# Patient Record
Sex: FEMALE | Race: WHITE | NOT HISPANIC OR LATINO | Employment: OTHER | URBAN - METROPOLITAN AREA
[De-identification: names, ages, dates, MRNs, and addresses within clinical notes are randomized per-mention and may not be internally consistent; named-entity substitution may affect disease eponyms.]

---

## 2019-07-26 ENCOUNTER — TELEPHONE (OUTPATIENT)
Dept: NEUROSURGERY | Facility: CLINIC | Age: 64
End: 2019-07-26

## 2019-07-26 NOTE — TELEPHONE ENCOUNTER
----- Message from Jennifer Elizondo sent at 7/26/2019 10:50 AM CDT -----  New patient no. [phone]    Before patient makes an appointment, she would like to know if Dr. Miller can help her.  She has moveable joints in the pelvis and lower back.  She can hear and feel them pop when she moves.   She has had xrays and an MRI.   Please call.

## 2019-08-13 ENCOUNTER — OFFICE VISIT (OUTPATIENT)
Dept: NEUROSURGERY | Facility: CLINIC | Age: 64
End: 2019-08-13
Payer: MEDICARE

## 2019-08-13 VITALS
BODY MASS INDEX: 34.72 KG/M2 | HEIGHT: 66 IN | HEART RATE: 64 BPM | WEIGHT: 216.06 LBS | DIASTOLIC BLOOD PRESSURE: 84 MMHG | SYSTOLIC BLOOD PRESSURE: 152 MMHG

## 2019-08-13 DIAGNOSIS — R20.2 PARESTHESIA OF BOTH FEET: ICD-10-CM

## 2019-08-13 DIAGNOSIS — M46.1 BILATERAL SACROILIITIS: ICD-10-CM

## 2019-08-13 DIAGNOSIS — M51.36 DEGENERATIVE DISC DISEASE, LUMBAR: ICD-10-CM

## 2019-08-13 DIAGNOSIS — M47.26 OTHER SPONDYLOSIS WITH RADICULOPATHY, LUMBAR REGION: Primary | ICD-10-CM

## 2019-08-13 PROCEDURE — 99204 PR OFFICE/OUTPT VISIT, NEW, LEVL IV, 45-59 MIN: ICD-10-PCS | Mod: S$PBB,,, | Performed by: PHYSICIAN ASSISTANT

## 2019-08-13 PROCEDURE — 99999 PR PBB SHADOW E&M-EST. PATIENT-LVL III: ICD-10-PCS | Mod: PBBFAC,,, | Performed by: PHYSICIAN ASSISTANT

## 2019-08-13 PROCEDURE — 99999 PR PBB SHADOW E&M-EST. PATIENT-LVL III: CPT | Mod: PBBFAC,,, | Performed by: PHYSICIAN ASSISTANT

## 2019-08-13 PROCEDURE — 99204 OFFICE O/P NEW MOD 45 MIN: CPT | Mod: S$PBB,,, | Performed by: PHYSICIAN ASSISTANT

## 2019-08-13 PROCEDURE — 99213 OFFICE O/P EST LOW 20 MIN: CPT | Mod: PBBFAC,PN | Performed by: PHYSICIAN ASSISTANT

## 2019-08-13 RX ORDER — LOSARTAN POTASSIUM AND HYDROCHLOROTHIAZIDE 12.5; 1 MG/1; MG/1
1 TABLET ORAL DAILY
Refills: 1 | COMMUNITY
Start: 2019-07-17 | End: 2020-01-02 | Stop reason: SDUPTHER

## 2019-08-13 RX ORDER — ATORVASTATIN CALCIUM 20 MG/1
20 TABLET, FILM COATED ORAL DAILY
Refills: 1 | COMMUNITY
Start: 2019-07-17 | End: 2019-10-29 | Stop reason: SDUPTHER

## 2019-08-13 RX ORDER — GABAPENTIN 300 MG/1
300 CAPSULE ORAL DAILY
Refills: 5 | COMMUNITY
Start: 2019-07-17 | End: 2019-12-02 | Stop reason: SDUPTHER

## 2019-08-13 RX ORDER — IBUPROFEN 600 MG/1
600 TABLET ORAL 2 TIMES DAILY
Refills: 0 | Status: ON HOLD | COMMUNITY
Start: 2019-05-09 | End: 2019-11-21 | Stop reason: HOSPADM

## 2019-08-13 RX ORDER — IBUPROFEN AND FAMOTIDINE 26.6; 8 MG/1; MG/1
TABLET ORAL
COMMUNITY
Start: 2019-05-09 | End: 2019-08-13

## 2019-08-13 RX ORDER — DUREZOL 0.5 MG/ML
EMULSION OPHTHALMIC
Refills: 1 | COMMUNITY
Start: 2019-07-25 | End: 2019-11-13 | Stop reason: CLARIF

## 2019-08-13 RX ORDER — BROMFENAC SODIUM 0.7 MG/ML
SOLUTION/ DROPS OPHTHALMIC
Refills: 1 | COMMUNITY
Start: 2019-05-10 | End: 2019-11-13 | Stop reason: CLARIF

## 2019-08-13 NOTE — PROGRESS NOTES
History & Physical    SUBJECTIVE:     Chief Complaint:  Back pain and ft paresthesia      History of Present Illness:  Sandrita Kaiser is 63 y.o.  female with history of former smoker (quit 03/10/2001), hyperlipidemia and hypertension who presents for neurosurgical evaluation, as a self-referral.  She says she has longstanding history of low back pain that radiated down both legs into her feet with associated bilateral feet numbness and tingling.  She underwent lumbar epidural injection no significant relief and subsequent L4-5 spinous process spacer on February 2019 with Dr. Obi Trejo (interventional pain specialist).  This surgery did improve her symptoms; she no longer has severe back pain, radiating thigh pain, and improved her bilateral feet numbness/tingling.  She was able to stand straighter and no longer leaning forward with walking.  However, since the surgery, she developed a lot of popping in her low back/SI joint area and continued to have bilateral feet numbness tingling (starting at arch) that radiates to her lateral ankle (left greater than right).  Pain worsens with walking stairs, swimming, or increased physical activity.  She underwent physical therapy about 3-4 months ago with no significant relief; certain stretches worsens her feet paresthesias.  Denies any bladder/bowel incontinence, gait instability, or saddle anesthesia.  She is taking gabapentin 300 mg once daily but it causes there is significant drowsiness and she has weaned herself off of this.  She has a back brace that is not helping.      Of note, she has previously been evaluated by ft doctor and had MRI of bilateral feet down with no significant findings.  EMG/nerve an x-ray bilateral lower extremity December 2018 show normal study with no radiculopathy.    Low Back Pain Scale  R Low Back-Pain Score: 3  R Low Back-Pain Intensity: Pain killers give moderate relief from pain  R Low Back-Pain Score: It is painful to look after myself  and I am slow and careful  Low Back-Lifting: Pain prevents me from lifting heavy weights off the floor, but I can manage if they are conveniently positioned for example on a table   Low Back-Walking: Pain prevents me walking more than .25 mile   Low Back-Sitting: Pain prevents me from sitting more than than 10 minutes   Low Back-Standing: I cannot stand for longer than 30 mins without increasing pain   Low Back-Sleeping: I have pain in bed but it does not prevent me from sleeping well   Low Back-Social Life: Pain has no significant effect on my social life apart from limiting my more en   Low Back-Traveling: Pain restricts me to short necessary journeys under 30 minutes   Low Back-Changing Degree of Pain: My pain seems to be getting better but improvement is slow           Review of patient's allergies indicates:  Allergies not on file    No current outpatient medications on file.     No current facility-administered medications for this visit.        No past medical history on file.  No past surgical history on file.  Family History     None        Social History     Socioeconomic History    Marital status:      Spouse name: Not on file    Number of children: Not on file    Years of education: Not on file    Highest education level: Not on file   Occupational History    Not on file   Social Needs    Financial resource strain: Not on file    Food insecurity:     Worry: Not on file     Inability: Not on file    Transportation needs:     Medical: Not on file     Non-medical: Not on file   Tobacco Use    Smoking status: Not on file   Substance and Sexual Activity    Alcohol use: Not on file    Drug use: Not on file    Sexual activity: Not on file   Lifestyle    Physical activity:     Days per week: Not on file     Minutes per session: Not on file    Stress: Not on file   Relationships    Social connections:     Talks on phone: Not on file     Gets together: Not on file     Attends Yarsani  "service: Not on file     Active member of club or organization: Not on file     Attends meetings of clubs or organizations: Not on file     Relationship status: Not on file   Other Topics Concern    Not on file   Social History Narrative    Not on file       Review of Systems:  Review of Systems    Constitutional: no fever, chills or night sweats. No changes in weight   Eyes: no visual changes   ENT: no nasal congestion or sore throat   Respiratory: no cough or shortness of breath   Cardiovascular: no chest pain or palpitations   Gastrointestinal: no nausea or vomiting   Genitourinary: no hematuria or dysuria   Integument/Breast: no rash or pruritis   Hematologic/Lymphatic: no easy bruising or lymphadenopathy   Musculoskeletal: no arthralgias or myalgias.  Positive for low back/SI joint pain  Neurological: no seizures or tremors. No weakness.  Positive for bilateral feet paresthesia.   Behavioral/Psych: no auditory or visual hallucinations   Endocrine: no heat or cold intolerance       OBJECTIVE:     Vital Signs  Pulse: 64  BP: (!) 152/84  Pain Score:   3  Height: 5' 6" (167.6 cm)  Weight: 98 kg (216 lb 0.8 oz)  Body mass index is 34.87 kg/m².      Physical Exam:  Neurosurgery Physical Exam    General: well developed, well nourished, no distress.  Comfortable.  Neurologic: Awake, alert and oriented x3. Thought content appropriate.  GCS: Motor: 6/Verbal: 5/Eyes: 4 GCS Total: 15  Cranial nerves: II-XII grossly intact. PERRLA. EOMI without nystagmus. Face symmetric and sensation intact to light touch, tongue midline, shoulder shrug symmetric bilaterally.  Hearing equal bilaterally to finger rub. Palate and uvula rise and fall normally in midline.    Language: no aphasia  Speech: no dysarthria   Neck: supple, without obvious masses    Sensory: intact to light touch B/L UE and LE  Motor Strength: Moves all extremities spontaneously with good tone. Full strength upper and lower extremities. No abnormal movements seen.  "   Strength  Deltoids Triceps Biceps Wrist Extension Wrist Flexion Hand    Upper: R 5/5 5/5 5/5 5/5 5/5 5/5    L 5/5 5/5 5/5 5/5 5/5 5/5     Iliopsoas Quadriceps Knee  Flexion Tibialis  anterior Gastro- cnemius EHL   Lower: R 5/5 5/5 5/5 5/5 5/5 5/5    L 5/5 5/5 5/5 5/5 5/5 5/5     Interossi muscle strength- intact    DTR's - 2 + and symmetric in UE and LE  Overton's - Negative        Lhermitte's - Negative  Spurlings-   Negative         Ankle Clonus - Negative           Babinski  - Negative     SLR - Negative   Gait - normal      Tandem Gait - No difficulty    Able to walk/stand on heels & toes  Cervical ROM and Lumbar ROM - full; no pain with all ROM  No focal numbness or weakness  No midline or paraspinal tenderness to palpation  No difficulty transitioning from seated to standing position or vice versa.  ENT: normal hearing with finger rub  Heart: RRR, no cyanosis, pallor, or edema.   Lungs- normal respiratory effort  Abdomen-  soft, symmetric and nontender  Skin: grossly intact in all 4 extremities without obvious rashes or lesions  Extremities: warm with no cyanosis or edema, or clubbing  Pulses: palpable distal pulses    SI Joint tenderness - positive (left greater than right)  Distraction test-positive right  Compression test-  positive right     Gaenslen's test- positive on right       Greater Trochanter Joint tenderness - Negative  Meir's Test - reproduces pain to right SI joint   Pain on Hip ROM - Negative  Tinel's - Negative Phalen's - Negative    Posture-  No obvious kyphosis with standing posture.  With bending posture, no obvious scapula wing        Diagnostic Results:  All imaging personally reviewed    Outside MRI L-spine dated January 2018  Lumbar lordosis well maintained.  Degenerative disc disease and facet arthropathy worse in lower lumbar levels.  Degenerative disc disease at L4-5 with small annular tear and facet arthropathy causing mild bilateral foraminal narrowing (R>L).  L5-S1 disc  disease and facet arthropathy causing no stenosis.        Outside lumbar AP lateral x-rays dated 01/2019 and 02/2019  No acute findings.  Interspinous spacer at L4-5 present on subsequent imaging.        2/2019          EMG/nerve an x-ray bilateral lower extremity December 2018 show normal study with no radiculopathy.      ASSESSMENT/PLAN:     63 y.o.  female with history of former smoker (quit 03/10/2001), hyperlipidemia and hypertension who presents for neurosurgical evaluation, as a self-referral.  She says she has longstanding history of low back pain that radiated down both legs into her feet with associated bilateral feet numbness and tingling.  She underwent lumbar epidural injection no significant relief and subsequent L4-5 spinous process spacer on February 2019 with Dr. Obi Trejo (interventional pain specialist).  This surgery did improve her walking, posture, and relieved her radiating thigh pain.  However, she continues to have bilateral feet paresthesia and new onset of SI joint/low back pain.  Exam consistent with right greater than left SI joint pain.  However, this may be referred pain from her lumbar spine and degenerative disc disease. I will upload all patient Records and CDs to [employer] system. Plan to review patient imaging case with Dr. Miller later this week when he is available.  I will update patient accordingly. In the interim, I have recommended her to continue PT home exercises and gabapentin.       All imaging were reviewed with patient. All questions were answered.  Patient verbalized understanding and agreed with the above plan of care.  Patient was encouraged to call clinic with any future concerns prior to follow up appt. If any worsening symptoms, patient should report to ED.     Please call with any questions.      Marshal Meyers PA-C  Neurosurgery      Note dictated with voice recognition software, please excuse any grammatical errors.

## 2019-08-14 ENCOUNTER — TELEPHONE (OUTPATIENT)
Dept: NEUROSURGERY | Facility: CLINIC | Age: 64
End: 2019-08-14

## 2019-08-14 NOTE — TELEPHONE ENCOUNTER
The disc can not be pacs there is not diacom. I will have it in the office on my desk for in the morning if you need it.

## 2019-09-10 ENCOUNTER — HOSPITAL ENCOUNTER (OUTPATIENT)
Dept: RADIOLOGY | Facility: HOSPITAL | Age: 64
Discharge: HOME | End: 2019-09-10
Attending: NEUROLOGICAL SURGERY
Payer: MEDICARE

## 2019-09-10 ENCOUNTER — OFFICE VISIT (OUTPATIENT)
Dept: NEUROSURGERY | Facility: CLINIC | Age: 64
End: 2019-09-10
Payer: MEDICARE

## 2019-09-10 VITALS
BODY MASS INDEX: 34.86 KG/M2 | DIASTOLIC BLOOD PRESSURE: 72 MMHG | HEIGHT: 66 IN | WEIGHT: 216.94 LBS | HEART RATE: 60 BPM | SYSTOLIC BLOOD PRESSURE: 153 MMHG

## 2019-09-10 DIAGNOSIS — M54.42 CHRONIC BILATERAL LOW BACK PAIN WITH BILATERAL SCIATICA: Primary | ICD-10-CM

## 2019-09-10 DIAGNOSIS — M54.41 CHRONIC BILATERAL LOW BACK PAIN WITH BILATERAL SCIATICA: Primary | ICD-10-CM

## 2019-09-10 DIAGNOSIS — G89.29 CHRONIC BILATERAL LOW BACK PAIN WITH BILATERAL SCIATICA: Primary | ICD-10-CM

## 2019-09-10 DIAGNOSIS — M54.42 CHRONIC BILATERAL LOW BACK PAIN WITH BILATERAL SCIATICA: ICD-10-CM

## 2019-09-10 DIAGNOSIS — M48.061 BILATERAL STENOSIS OF LATERAL RECESS OF LUMBAR SPINE: ICD-10-CM

## 2019-09-10 DIAGNOSIS — M54.41 CHRONIC BILATERAL LOW BACK PAIN WITH BILATERAL SCIATICA: ICD-10-CM

## 2019-09-10 DIAGNOSIS — M43.16 SPONDYLOLISTHESIS AT L4-L5 LEVEL: ICD-10-CM

## 2019-09-10 DIAGNOSIS — G89.29 CHRONIC BILATERAL LOW BACK PAIN WITH BILATERAL SCIATICA: ICD-10-CM

## 2019-09-10 PROCEDURE — 72100 XR LUMBAR SPINE AP AND LAT WITH FLEX/EXT: ICD-10-PCS | Mod: 26,,, | Performed by: RADIOLOGY

## 2019-09-10 PROCEDURE — 72120 X-RAY BEND ONLY L-S SPINE: CPT | Mod: 26,,, | Performed by: RADIOLOGY

## 2019-09-10 PROCEDURE — 72120 XR LUMBAR SPINE AP AND LAT WITH FLEX/EXT: ICD-10-PCS | Mod: 26,,, | Performed by: RADIOLOGY

## 2019-09-10 PROCEDURE — 99999 PR PBB SHADOW E&M-EST. PATIENT-LVL III: ICD-10-PCS | Mod: PBBFAC,,, | Performed by: NEUROLOGICAL SURGERY

## 2019-09-10 PROCEDURE — 99213 OFFICE O/P EST LOW 20 MIN: CPT | Mod: S$PBB,,, | Performed by: NEUROLOGICAL SURGERY

## 2019-09-10 PROCEDURE — 72100 X-RAY EXAM L-S SPINE 2/3 VWS: CPT | Mod: TC,PN

## 2019-09-10 PROCEDURE — 99999 PR PBB SHADOW E&M-EST. PATIENT-LVL III: CPT | Mod: PBBFAC,,, | Performed by: NEUROLOGICAL SURGERY

## 2019-09-10 PROCEDURE — 99213 OFFICE O/P EST LOW 20 MIN: CPT | Mod: PBBFAC,25,PN | Performed by: NEUROLOGICAL SURGERY

## 2019-09-10 PROCEDURE — 99213 PR OFFICE/OUTPT VISIT, EST, LEVL III, 20-29 MIN: ICD-10-PCS | Mod: S$PBB,,, | Performed by: NEUROLOGICAL SURGERY

## 2019-09-10 PROCEDURE — 72100 X-RAY EXAM L-S SPINE 2/3 VWS: CPT | Mod: 26,,, | Performed by: RADIOLOGY

## 2019-09-10 NOTE — PROGRESS NOTES
"NEUROSURGICAL PROGRESS NOTE    DATE OF SERVICE:  09/10/2019    ATTENDING PHYSICIAN:  Ramos Miller MD    SUBJECTIVE:    INTERIM HISTORY:    This is a very pleasant 63 y.o. female, who is status post placement of the inter spinous spacer in an outside facility by pain management in February 2019 for lateral recess stenosis with radiculopathy.  After the surgery she was able to stand more straight but 1 month later she started having more back pain.  She feels a sensation of instability in her back.  She feels that her back is "popping" more.  Pain is constant but worse with activity such as walking, biking, twisting. Back pain is moderate at this time but interfering with her QOL. She did 3-4 months of PT in the past.       Low Back Pain Scale  R Low Back-Pain Score: 3  R Low Back-Pain Intensity: Pain killers give moderate relief from pain  R Low Back-Pain Score: It is painful to look after myself and I am slow and careful  Low Back-Lifting: Pain prevents me from lifting heavy weights off the floor, but I can manage if they are conveniently positioned for example on a table   Low Back-Walking: Pain prevents me walking more than .25 mile   Low Back-Sitting: Pain prevents me from sitting more than 1 hour   Low Back-Standing: I cannot stand for longer than 30 mins without increasing pain   Low Back-Sleeping: I have pain in bed but it does not prevent me from sleeping well   Low Back-Social Life: Pain has no significant effect on my social life apart from limiting my more en   Low Back-Traveling: Pain restricts me to short necessary journeys under 30 minutes   Low Back-Changing Degree of Pain: My pain is gradually worsening         PAST MEDICAL HISTORY:  Active Ambulatory Problems     Diagnosis Date Noted    No Active Ambulatory Problems     Resolved Ambulatory Problems     Diagnosis Date Noted    No Resolved Ambulatory Problems     No Additional Past Medical History       PAST SURGICAL HISTORY:  No past surgical " history on file.    SOCIAL HISTORY:   Social History     Socioeconomic History    Marital status:      Spouse name: Not on file    Number of children: Not on file    Years of education: Not on file    Highest education level: Not on file   Occupational History    Not on file   Social Needs    Financial resource strain: Not on file    Food insecurity:     Worry: Not on file     Inability: Not on file    Transportation needs:     Medical: Not on file     Non-medical: Not on file   Tobacco Use    Smoking status: Former Smoker    Smokeless tobacco: Former User     Quit date: 3/10/2001    Tobacco comment: 03/10/2001-   Substance and Sexual Activity    Alcohol use: Never     Frequency: Never    Drug use: Never    Sexual activity: Not on file   Lifestyle    Physical activity:     Days per week: Not on file     Minutes per session: Not on file    Stress: Not on file   Relationships    Social connections:     Talks on phone: Not on file     Gets together: Not on file     Attends Protestant service: Not on file     Active member of club or organization: Not on file     Attends meetings of clubs or organizations: Not on file     Relationship status: Not on file   Other Topics Concern    Not on file   Social History Narrative    Not on file       FAMILY HISTORY:  No family history on file.    CURRENTS MEDICATIONS:  Current Outpatient Medications on File Prior to Visit   Medication Sig Dispense Refill    atorvastatin (LIPITOR) 20 MG tablet TK 1 T PO ONCE D  1    DUREZOL 0.05 % Drop ophthalmic solution INT 1 GTT IN OS QID  1    gabapentin (NEURONTIN) 300 MG capsule TK ONE C PO ONCE A DAY  5    ibuprofen (ADVIL,MOTRIN) 600 MG tablet TK 1 T PO TID WF OR MILK PRN  0    losartan-hydrochlorothiazide 100-12.5 mg (HYZAAR) 100-12.5 mg Tab TK 1 T PO ONCE A DAY  1    PROLENSA 0.07 % Drop INSTILL 1 DROP INTO THE RIGHT EYE TID  1     No current facility-administered medications on file prior to visit.         ALLERGIES:  Review of patient's allergies indicates:  No Known Allergies    REVIEW OF SYSTEMS:  Review of Systems   Constitutional: Negative for diaphoresis, fever and weight loss.   Respiratory: Negative for shortness of breath.    Cardiovascular: Negative for chest pain.   Gastrointestinal: Negative for blood in stool.   Genitourinary: Negative for hematuria.   Endo/Heme/Allergies: Does not bruise/bleed easily.   All other systems reviewed and are negative.        OBJECTIVE:    PHYSICAL EXAMINATION:   Vitals:    09/10/19 0810   BP: (!) 153/72   Pulse: 60       Physical Exam:  Vitals reviewed.    Constitutional: She appears well-developed and well-nourished.     Eyes: Pupils are equal, round, and reactive to light. Conjunctivae and EOM are normal.     Cardiovascular: Normal distal pulses and no edema.     Abdominal: Soft.     Skin: Skin displays no rash on trunk and no rash on extremities. Skin displays no lesions on trunk and no lesions on extremities.     Psych/Behavior: She is alert. She is oriented to person, place, and time. She has a normal mood and affect.     Musculoskeletal:        Neck: Range of motion is full.     Neurological:        DTRs: Tricep reflexes are 2+ on the right side and 2+ on the left side. Bicep reflexes are 2+ on the right side and 2+ on the left side. Brachioradialis reflexes are 2+ on the right side and 2+ on the left side. Patellar reflexes are 2+ on the right side and 2+ on the left side. Achilles reflexes are 2+ on the right side and 2+ on the left side.       Back Exam     Tenderness   The patient is experiencing no tenderness.     Range of Motion   Extension: abnormal   Flexion: abnormal   Lateral bend right: abnormal   Lateral bend left: abnormal   Rotation right: abnormal   Rotation left: abnormal     Muscle Strength   Right Quadriceps:  5/5   Left Quadriceps:  5/5   Right Hamstrings:  5/5   Left Hamstrings:  5/5     Tests   Straight leg raise right: negative  Straight leg  raise left: negative    Other   Toe walk: normal  Heel walk: normal                Neurologic Exam     Mental Status   Oriented to person, place, and time.   Speech: speech is normal   Level of consciousness: alert    Cranial Nerves   Cranial nerves II through XII intact.     CN III, IV, VI   Pupils are equal, round, and reactive to light.  Extraocular motions are normal.     Motor Exam   Muscle bulk: normal  Overall muscle tone: normal    Strength   Right deltoid: 5/5  Left deltoid: 5/5  Right biceps: 5/5  Left biceps: 5/5  Right triceps: 5/5  Left triceps: 5/5  Right wrist flexion: 5/5  Left wrist flexion: 5/5  Right wrist extension: 5/5  Left wrist extension: 5/5  Right interossei: 5/5  Left interossei: 5/5  Right iliopsoas: 5/5  Left iliopsoas: 5/5  Right quadriceps: 5/5  Left quadriceps: 5/5  Right hamstrin/5  Left hamstrin/5  Right anterior tibial: 5/5  Left anterior tibial: 5/5  Right posterior tibial: 5/5  Left posterior tibial: 5/5  Right peroneal: 5/5  Left peroneal: 5/5  Right gastroc: 5/5  Left gastroc: 5/5    Sensory Exam   Light touch normal.   Pinprick normal.     Gait, Coordination, and Reflexes     Gait  Gait: normal    Coordination   Finger to nose coordination: normal  Tandem walking coordination: normal    Reflexes   Right brachioradialis: 2+  Left brachioradialis: 2+  Right biceps: 2+  Left biceps: 2+  Right triceps: 2+  Left triceps: 2+  Right patellar: 2+  Left patellar: 2+  Right achilles: 2+  Left achilles: 2+  Right plantar: normal  Left plantar: normal  Right Overton: absent  Left Overton: absent  Right ankle clonus: absent  Left ankle clonus: absent        DIAGNOSTIC DATA:  I personally interpreted the following imaging:   Lumbar spine MRI 2019: L4-5 grade one spondylolisthesis with bilateral lateral recess stenosis  Lumbar XR today: worsening L4-5 spondylolisthesis    ASSESMENT:  This is a 63 y.o. 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 I recommended a left L4-5 oblique interbody fusion placement interbody spacer using allograft, L4-5 posterior instrumentation, removal of interspinous process.     We have discussed the risks of surgery including bleeding, infection, failure of surgery, CSF leak, nerve root injury, spinal cord injury, ureter injury, weakness, paralysis, peripheral neuropathy, malplaced hardware, migration of hardware, non-union, need for reoperation. Patient understands the risks and would like to proceed with surgery.              Ramos Miller MD  Cell:[phone]

## 2019-09-11 ENCOUNTER — TELEPHONE (OUTPATIENT)
Dept: NEUROSURGERY | Facility: CLINIC | Age: 64
End: 2019-09-11

## 2019-09-11 NOTE — TELEPHONE ENCOUNTER
----- Message from Isa Noyola sent at 9/11/2019 12:21 PM CDT -----  Contact: Self [phone]  Patient would like to speak with you about what can she do after her fusion procedure. Please advise

## 2019-09-12 ENCOUNTER — TELEPHONE (OUTPATIENT)
Dept: NEUROSURGERY | Facility: CLINIC | Age: 64
End: 2019-09-12

## 2019-09-12 DIAGNOSIS — M43.10 SPONDYLOLISTHESIS, UNSPECIFIED SPINAL REGION: ICD-10-CM

## 2019-09-12 DIAGNOSIS — Z98.1 S/P LUMBAR FUSION: Primary | ICD-10-CM

## 2019-09-12 DIAGNOSIS — M51.36 DDD (DEGENERATIVE DISC DISEASE), LUMBAR: ICD-10-CM

## 2019-09-12 DIAGNOSIS — M48.061 STENOSIS OF LATERAL RECESS OF LUMBAR SPINE: ICD-10-CM

## 2019-10-10 ENCOUNTER — TELEPHONE (OUTPATIENT)
Dept: NEUROSURGERY | Facility: CLINIC | Age: 64
End: 2019-10-10

## 2019-10-10 NOTE — TELEPHONE ENCOUNTER
----- Message from Luly Smith sent at 10/10/2019  3:15 PM CDT -----  Contact: DELORES KIRAN [[id number]]  [phone]    Patient needs to have her testing scheduled in [place]. She stated she is having issues with her insurance paying for the tests at the [facility].

## 2019-10-29 ENCOUNTER — OFFICE VISIT (OUTPATIENT)
Dept: FAMILY MEDICINE | Facility: CLINIC | Age: 64
End: 2019-10-29
Payer: MEDICARE

## 2019-10-29 ENCOUNTER — HOSPITAL ENCOUNTER (OUTPATIENT)
Dept: RADIOLOGY | Facility: HOSPITAL | Age: 64
Discharge: HOME | End: 2019-10-29
Attending: NURSE PRACTITIONER
Payer: MEDICARE

## 2019-10-29 ENCOUNTER — TELEPHONE (OUTPATIENT)
Dept: FAMILY MEDICINE | Facility: CLINIC | Age: 64
End: 2019-10-29

## 2019-10-29 VITALS
HEART RATE: 64 BPM | BODY MASS INDEX: 35.99 KG/M2 | SYSTOLIC BLOOD PRESSURE: 136 MMHG | WEIGHT: 216 LBS | HEIGHT: 65 IN | DIASTOLIC BLOOD PRESSURE: 72 MMHG

## 2019-10-29 DIAGNOSIS — I10 HYPERTENSION, UNSPECIFIED TYPE: ICD-10-CM

## 2019-10-29 DIAGNOSIS — Z01.818 PRE-OPERATIVE CLEARANCE: ICD-10-CM

## 2019-10-29 DIAGNOSIS — E78.5 HYPERLIPIDEMIA, UNSPECIFIED HYPERLIPIDEMIA TYPE: Primary | ICD-10-CM

## 2019-10-29 DIAGNOSIS — M54.16 LUMBAR RADICULOPATHY: ICD-10-CM

## 2019-10-29 PROCEDURE — 99214 PR OFFICE/OUTPT VISIT, EST, LEVL IV, 30-39 MIN: ICD-10-PCS | Mod: S$GLB,,, | Performed by: NURSE PRACTITIONER

## 2019-10-29 PROCEDURE — 99214 OFFICE O/P EST MOD 30 MIN: CPT | Mod: S$GLB,,, | Performed by: NURSE PRACTITIONER

## 2019-10-29 RX ORDER — ATORVASTATIN CALCIUM 20 MG/1
20 TABLET, FILM COATED ORAL DAILY
Qty: 90 TABLET | Refills: 1 | Status: SHIPPED | OUTPATIENT
Start: 2019-10-29 | End: 2019-12-02 | Stop reason: SDUPTHER

## 2019-10-29 NOTE — PROGRESS NOTES
SUBJECTIVE:    Patient ID: Sandrita Kaiser is a 64 y.o. female.    Chief Complaint: Pre-op Exam (infuse L4 & L5// SW)    Pt here for surgery clearance.  Back pain is getting worse. Plans to have fusion performed with Dr. Miller in [place]. Surgery is scheduled for mid November.  Compliant with meds. Hx of white coat syndrome. Home readings look great around 110-130's/70's consistently.. Still under a good bit of stress with her  who has dementia. Still dealing with distal neuropathy but all better overall after lumbar spacer. No longer able to exercise. Scheduled for surgery on November 19th with Dr. Miller. Plans to have lumbar fusion. Needs preop labs ekg and chest xray.       Office Visit on 10/29/2019   Component Date Value Ref Range Status    WBC 10/29/2019 6.8  3.8 - 10.8 Thousand/uL Final    RBC 10/29/2019 4.70  3.80 - 5.10 Million/uL Final    Hemoglobin 10/29/2019 13.3  11.7 - 15.5 g/dL Final    Hematocrit 10/29/2019 39.3  35.0 - 45.0 % Final    Mean Corpuscular Volume 10/29/2019 83.6  80.0 - 100.0 fL Final    Mean Corpuscular Hemoglobin 10/29/2019 28.3  27.0 - 33.0 pg Final    Mean Corpuscular Hemoglobin Conc 10/29/2019 33.8  32.0 - 36.0 g/dL Final    RDW 10/29/2019 14.2  11.0 - 15.0 % Final    Platelets 10/29/2019 324  140 - 400 Thousand/uL Final    MPV 10/29/2019 9.6  7.5 - 12.5 fL Final    Neutrophils Absolute 10/29/2019 3,305  1,500 - 7,800 cells/uL Final    Lymph # 10/29/2019 2,734  850 - 3,900 cells/uL Final    Mono # 10/29/2019 530  200 - 950 cells/uL Final    Eos # 10/29/2019 190  15 - 500 cells/uL Final    Baso # 10/29/2019 41  0 - 200 cells/uL Final    Neutrophils Relative 10/29/2019 48.6  % Final    Lymph% 10/29/2019 40.2  % Final    Mono% 10/29/2019 7.8  % Final    Eosinophil% 10/29/2019 2.8  % Final    Basophil% 10/29/2019 0.6  % Final    Glucose 10/29/2019 95  65 - 99 mg/dL Final    BUN, Bld 10/29/2019 13  7 - 25 mg/dL Final    Creatinine 10/29/2019 0.96  0.50 -  0.99 mg/dL Final    eGFR if non African American 10/29/2019 62  > OR = 60 mL/min/1.73m2 Final    eGFR if  10/29/2019 71  > OR = 60 mL/min/1.73m2 Final    BUN/Creatinine Ratio 10/29/2019 NOT APPLICABLE  6 - 22 (calc) Final    Sodium 10/29/2019 135  135 - 146 mmol/L Final    Potassium 10/29/2019 4.4  3.5 - 5.3 mmol/L Final    Chloride 10/29/2019 97* 98 - 110 mmol/L Final    CO2 10/29/2019 30  20 - 32 mmol/L Final    Calcium 10/29/2019 10.0  8.6 - 10.4 mg/dL Final    Total Protein 10/29/2019 7.3  6.1 - 8.1 g/dL Final    Albumin 10/29/2019 4.3  3.6 - 5.1 g/dL Final    Globulin, Total 10/29/2019 3.0  1.9 - 3.7 g/dL (calc) Final    Albumin/Globulin Ratio 10/29/2019 1.4  1.0 - 2.5 (calc) Final    Total Bilirubin 10/29/2019 0.5  0.2 - 1.2 mg/dL Final    Alkaline Phosphatase 10/29/2019 91  33 - 130 U/L Final    AST 10/29/2019 17  10 - 35 U/L Final    ALT 10/29/2019 24  6 - 29 U/L Final    INR 10/29/2019 0.9   Final    Prothrombin Time 10/29/2019 9.8  9.0 - 11.5 sec Final    aPTT 10/29/2019 30  22 - 34 sec Final    Color, UA 10/29/2019 YELLOW  YELLOW Final    Appearance, UA 10/29/2019 CLEAR  CLEAR Final    Specific [place], UA 10/29/2019 1.015  1.001 - 1.035 Final    pH, UA 10/29/2019 8.0  5.0 - 8.0 Final    Glucose, UA 10/29/2019 NEGATIVE  NEGATIVE Final    Bilirubin, UA 10/29/2019 NEGATIVE  NEGATIVE Final    Ketones, UA 10/29/2019 NEGATIVE  NEGATIVE Final    Occult Blood UA 10/29/2019 NEGATIVE  NEGATIVE Final    Protein, UA 10/29/2019 NEGATIVE  NEGATIVE Final    Nitrite, UA 10/29/2019 NEGATIVE  NEGATIVE Final    Leukocytes, UA 10/29/2019 NEGATIVE  NEGATIVE Final    WBC Casts, UA 10/29/2019 NONE SEEN  < OR = 5 /HPF Final    RBC Casts, UA 10/29/2019 NONE SEEN  < OR = 2 /HPF Final    Squam Epithel, UA 10/29/2019 0-5  < OR = 5 /HPF Final    Bacteria, UA 10/29/2019 NONE SEEN  NONE SEEN /HPF Final    Hyaline Casts, UA 10/29/2019 NONE SEEN  NONE SEEN /LPF Final       Past Medical  History:   Diagnosis Date    Hypertension      Past Surgical History:   Procedure Laterality Date    BREAST BIOPSY      TONSILLECTOMY      TUBAL LIGATION       History reviewed. No pertinent family history.    Marital Status:   Alcohol History:  reports that she does not drink alcohol.  Tobacco History:  reports that she has quit smoking. She quit smokeless tobacco use about 18 years ago.  Drug History:  reports that she does not use drugs.    Review of patient's allergies indicates:   Allergen Reactions    Lisinopril      Other reaction(s): BETTE       Current Outpatient Medications:     atorvastatin (LIPITOR) 20 MG tablet, Take 1 tablet (20 mg total) by mouth once daily., Disp: 90 tablet, Rfl: 1    FLUZONE HIGH-DOSE 2019-20, PF, 180 mcg/0.5 mL Syrg, ADM 0.5ML IM UTD, Disp: , Rfl: 0    gabapentin (NEURONTIN) 300 MG capsule, Take 300 mg by mouth once daily. , Disp: , Rfl: 5    ibuprofen (ADVIL,MOTRIN) 600 MG tablet, Take 600 mg by mouth 2 (two) times daily. , Disp: , Rfl: 0    losartan-hydrochlorothiazide 100-12.5 mg (HYZAAR) 100-12.5 mg Tab, Take 1 tablet by mouth once daily. , Disp: , Rfl: 1    DUREZOL 0.05 % Drop ophthalmic solution, INT 1 GTT IN OS QID, Disp: , Rfl: 1    PROLENSA 0.07 % Drop, INSTILL 1 DROP INTO THE RIGHT EYE TID, Disp: , Rfl: 1    Review of Systems   Constitutional: Negative for chills, fever and unexpected weight change.   HENT: Negative for ear pain, rhinorrhea and sore throat.    Eyes: Negative for pain and visual disturbance.   Respiratory: Negative for cough and shortness of breath.    Cardiovascular: Negative for chest pain, palpitations and leg swelling.   Gastrointestinal: Negative for abdominal pain, diarrhea, nausea and vomiting.   Genitourinary: Negative for difficulty urinating, hematuria and vaginal bleeding.   Musculoskeletal: Positive for back pain. Negative for arthralgias.   Skin: Negative for rash.   Neurological: Negative for dizziness, weakness and  "headaches.   Psychiatric/Behavioral: Negative for agitation and sleep disturbance. The patient is not nervous/anxious.           Objective:      Vitals:    10/29/19 1012   BP: 136/72   Pulse: 64   Weight: 98 kg (216 lb)   Height: 5' 5.25" (1.657 m)     Body mass index is 35.67 kg/m².  Physical Exam   Constitutional: She is oriented to person, place, and time. She appears well-developed and well-nourished.   HENT:   Right Ear: External ear normal.   Left Ear: External ear normal.   Mouth/Throat: Oropharynx is clear and moist.   Neck: Normal range of motion. Neck supple. No JVD present.   Cardiovascular: Normal rate and regular rhythm.   No murmur heard.  Pulmonary/Chest: Effort normal and breath sounds normal.   Abdominal: Soft. Bowel sounds are normal.   Musculoskeletal: She exhibits no edema or deformity.        Lumbar back: She exhibits decreased range of motion.   Lymphadenopathy:     She has no cervical adenopathy.   Neurological: She is alert and oriented to person, place, and time. Gait normal.   Skin: Skin is warm, dry and intact. No rash noted.   Psychiatric: She has a normal mood and affect. Her speech is normal and behavior is normal.         Assessment:       1. Hyperlipidemia, unspecified hyperlipidemia type    2. Hypertension, unspecified type    3. Lumbar radiculopathy    4. Pre-operative clearance         Plan:       Hyperlipidemia, unspecified hyperlipidemia type  -     atorvastatin (LIPITOR) 20 MG tablet; Take 1 tablet (20 mg total) by mouth once daily.  Dispense: 90 tablet; Refill: 1    Hypertension, unspecified type    Lumbar radiculopathy    Pre-operative clearance  -     POCT EKG 12-LEAD (NOT FOR [facility] USE)  -     X-Ray Chest PA And Lateral; Future; Expected date: 10/29/2019  -     CBC auto differential; Future; Expected date: 10/29/2019  -     Comprehensive metabolic panel; Future; Expected date: 10/29/2019  -     Protime-INR; Future; Expected date: 10/29/2019  -     APTT; Future; Expected " date: 10/29/2019  -     Urinalysis, Reflex to Urine Culture Urine, Clean Catch; Future; Expected date: 10/29/2019      Follow up in about 3 months (around 1/29/2020).

## 2019-10-29 NOTE — TELEPHONE ENCOUNTER
----- Message from Roz Mccann sent at 10/29/2019  2:21 PM CDT -----  Pt is suppose to have surgery nov 19 and her insurance wont pay for the xray in [place] because the surgery is in [facility] . Pt called her insurance and they are saying she is needing the nurse to call medicare to get the code for her insurance to pay for the xray in [place]. Medicare [phone]. They did to know that [facility] is with [facility]   Pt [phone]

## 2019-10-30 ENCOUNTER — TELEPHONE (OUTPATIENT)
Dept: NEUROSURGERY | Facility: CLINIC | Age: 64
End: 2019-10-30

## 2019-10-30 LAB
ALBUMIN SERPL-MCNC: 4.3 G/DL (ref 3.6–5.1)
ALBUMIN/GLOB SERPL: 1.4 (CALC) (ref 1–2.5)
ALP SERPL-CCNC: 91 U/L (ref 33–130)
ALT SERPL-CCNC: 24 U/L (ref 6–29)
APPEARANCE UR: CLEAR
APTT PPP: 30 SEC (ref 22–34)
AST SERPL-CCNC: 17 U/L (ref 10–35)
BACTERIA #/AREA URNS HPF: NORMAL /HPF
BACTERIA UR CULT: NORMAL
BASOPHILS # BLD AUTO: 41 CELLS/UL (ref 0–200)
BASOPHILS NFR BLD AUTO: 0.6 %
BILIRUB SERPL-MCNC: 0.5 MG/DL (ref 0.2–1.2)
BILIRUB UR QL STRIP: NEGATIVE
BUN SERPL-MCNC: 13 MG/DL (ref 7–25)
BUN/CREAT SERPL: ABNORMAL (CALC) (ref 6–22)
CALCIUM SERPL-MCNC: 10 MG/DL (ref 8.6–10.4)
CHLORIDE SERPL-SCNC: 97 MMOL/L (ref 98–110)
CO2 SERPL-SCNC: 30 MMOL/L (ref 20–32)
COLOR UR: YELLOW
CREAT SERPL-MCNC: 0.96 MG/DL (ref 0.5–0.99)
EOSINOPHIL # BLD AUTO: 190 CELLS/UL (ref 15–500)
EOSINOPHIL NFR BLD AUTO: 2.8 %
ERYTHROCYTE [DISTWIDTH] IN BLOOD BY AUTOMATED COUNT: 14.2 % (ref 11–15)
GFRSERPLBLD MDRD-ARVRAT: 62 ML/MIN/1.73M2
GLOBULIN SER CALC-MCNC: 3 G/DL (CALC) (ref 1.9–3.7)
GLUCOSE SERPL-MCNC: 95 MG/DL (ref 65–99)
GLUCOSE UR QL STRIP: NEGATIVE
HCT VFR BLD AUTO: 39.3 % (ref 35–45)
HGB BLD-MCNC: 13.3 G/DL (ref 11.7–15.5)
HGB UR QL STRIP: NEGATIVE
HYALINE CASTS #/AREA URNS LPF: NORMAL /LPF
INR PPP: 0.9
KETONES UR QL STRIP: NEGATIVE
LEUKOCYTE ESTERASE UR QL STRIP: NEGATIVE
LYMPHOCYTES # BLD AUTO: 2734 CELLS/UL (ref 850–3900)
LYMPHOCYTES NFR BLD AUTO: 40.2 %
MCH RBC QN AUTO: 28.3 PG (ref 27–33)
MCHC RBC AUTO-ENTMCNC: 33.8 G/DL (ref 32–36)
MCV RBC AUTO: 83.6 FL (ref 80–100)
MONOCYTES # BLD AUTO: 530 CELLS/UL (ref 200–950)
MONOCYTES NFR BLD AUTO: 7.8 %
NEUTROPHILS # BLD AUTO: 3305 CELLS/UL (ref 1500–7800)
NEUTROPHILS NFR BLD AUTO: 48.6 %
NITRITE UR QL STRIP: NEGATIVE
PH UR STRIP: 8 [PH] (ref 5–8)
PLATELET # BLD AUTO: 324 THOUSAND/UL (ref 140–400)
PMV BLD REES-ECKER: 9.6 FL (ref 7.5–12.5)
POTASSIUM SERPL-SCNC: 4.4 MMOL/L (ref 3.5–5.3)
PROT SERPL-MCNC: 7.3 G/DL (ref 6.1–8.1)
PROT UR QL STRIP: NEGATIVE
PROTHROMBIN TIME: 9.8 SEC (ref 9–11.5)
RBC # BLD AUTO: 4.7 MILLION/UL (ref 3.8–5.1)
RBC #/AREA URNS HPF: NORMAL /HPF
SODIUM SERPL-SCNC: 135 MMOL/L (ref 135–146)
SP GR UR STRIP: 1.01 (ref 1–1.03)
SQUAMOUS #/AREA URNS HPF: NORMAL /HPF
WBC # BLD AUTO: 6.8 THOUSAND/UL (ref 3.8–10.8)
WBC #/AREA URNS HPF: NORMAL /HPF

## 2019-10-30 NOTE — TELEPHONE ENCOUNTER
----- Message from Ida Zaragoza sent at 10/30/2019  2:05 PM CDT -----  Contact: self, [phone]  Patient states she is scheduled for procedure on 11/19 and Medicare will not cover X-ray ordered. Please advise.

## 2019-10-30 NOTE — TELEPHONE ENCOUNTER
"Tried calling medicare - sat on hold and got put through to someone that stated she can only speak with providers and would not go further unless I had the providers PTAN for medicare. I spoke with the pt and let her know we are unable to get this "code" she says they need. She is going to call the surgeon and see what can be done.  "

## 2019-10-30 NOTE — TELEPHONE ENCOUNTER
Spoke with pt, states the insurance is not going to pay for her to have this xray done in [place] unless a provider calls Medicare and gets a code.

## 2019-10-31 ENCOUNTER — HOSPITAL ENCOUNTER (OUTPATIENT)
Dept: RADIOLOGY | Facility: HOSPITAL | Age: 64
Discharge: HOME | End: 2019-10-31
Attending: NURSE PRACTITIONER
Payer: MEDICARE

## 2019-10-31 PROCEDURE — 71046 X-RAY EXAM CHEST 2 VIEWS: CPT | Mod: 26,,, | Performed by: RADIOLOGY

## 2019-10-31 PROCEDURE — 71046 XR CHEST PA AND LATERAL: ICD-10-PCS | Mod: 26,,, | Performed by: RADIOLOGY

## 2019-10-31 PROCEDURE — 71046 X-RAY EXAM CHEST 2 VIEWS: CPT | Mod: TC,FY

## 2019-11-05 ENCOUNTER — TELEPHONE (OUTPATIENT)
Dept: SURGERY | Facility: HOSPITAL | Age: 64
End: 2019-11-05

## 2019-11-13 ENCOUNTER — TELEPHONE (OUTPATIENT)
Dept: FAMILY MEDICINE | Facility: CLINIC | Age: 64
End: 2019-11-13

## 2019-11-13 ENCOUNTER — ANESTHESIA EVENT (OUTPATIENT)
Dept: SURGERY | Facility: HOSPITAL | Age: 64
DRG: 460 | End: 2019-11-13
Payer: MEDICARE

## 2019-11-13 ENCOUNTER — HOSPITAL ENCOUNTER (OUTPATIENT)
Dept: PREADMISSION TESTING | Facility: HOSPITAL | Age: 64
Discharge: HOME | End: 2019-11-13
Attending: NEUROLOGICAL SURGERY
Payer: MEDICARE

## 2019-11-13 DIAGNOSIS — Z01.818 PREOP TESTING: Primary | ICD-10-CM

## 2019-11-13 RX ORDER — LIDOCAINE HYDROCHLORIDE 10 MG/ML
1 INJECTION, SOLUTION EPIDURAL; INFILTRATION; INTRACAUDAL; PERINEURAL ONCE
Status: CANCELLED | OUTPATIENT
Start: 2019-11-13 | End: 2019-11-13

## 2019-11-13 RX ORDER — OMEPRAZOLE 20 MG/1
20 CAPSULE, DELAYED RELEASE ORAL DAILY
COMMUNITY
End: 2021-02-05 | Stop reason: SDUPTHER

## 2019-11-13 RX ORDER — MULTIVITAMIN
1 TABLET ORAL DAILY
COMMUNITY

## 2019-11-13 RX ORDER — SODIUM CHLORIDE, SODIUM LACTATE, POTASSIUM CHLORIDE, CALCIUM CHLORIDE 600; 310; 30; 20 MG/100ML; MG/100ML; MG/100ML; MG/100ML
INJECTION, SOLUTION INTRAVENOUS CONTINUOUS
Status: CANCELLED | OUTPATIENT
Start: 2019-11-13

## 2019-11-13 NOTE — ANESTHESIA PREPROCEDURE EVALUATION
11/13/2019  Sandrita Kaiser is a 64 y.o., female scheduled for L4-5 lumbar fusion on 11/19/19.    Past Medical History:   Diagnosis Date    High cholesterol     Hypertension      Past Surgical History:   Procedure Laterality Date    BREAST BIOPSY      TONSILLECTOMY      TUBAL LIGATION       Anesthesia Evaluation    I have reviewed the Patient Summary Reports.    I have reviewed the Nursing Notes.   I have reviewed the Medications.     Review of Systems  Anesthesia Hx:  Hx of Anesthetic complications PONV Denies Family Hx of Anesthesia complications.    Social:  Former Smoker, No Alcohol Use    Hematology/Oncology:  Hematology Normal        Cardiovascular:   Hypertension  Denies Angina. hyperlipidemia    Renal/:  Renal/ Normal     Hepatic/GI:  Hepatic/GI Normal    Musculoskeletal:  Lumbar Spine Disorders, Lumbar Disc Disease, Spondylolisthesis   Neurological:  Neurology Normal    Endocrine:  Endocrine Normal        Physical Exam  General:  Well nourished    Airway/Jaw/Neck:  Airway Findings: Mouth Opening: Normal Tongue: Normal  General Airway Assessment: Adult  Mallampati: II  Improves to I with phonation.  TM Distance: Normal, at least 6 cm      Dental:  Dental Findings:    Chest/Lungs:  Chest/Lungs Findings: Clear to auscultation, Normal Respiratory Rate     Heart/Vascular:  Heart Findings: Rate: Normal  Rhythm: Regular Rhythm  Sounds: Normal        Mental Status:  Mental Status Findings:  Cooperative, Alert and Oriented         Anesthesia Plan  Type of Anesthesia, risks & benefits discussed:  Anesthesia Type:  general  Patient's Preference:   Intra-op Monitoring Plan: standard ASA monitors  Intra-op Monitoring Plan Comments:   Post Op Pain Control Plan: multimodal analgesia  Post Op Pain Control Plan Comments:   Induction:   IV  Beta Blocker:  Patient is not currently on a Beta-Blocker (No  further documentation required).       Informed Consent: Patient understands risks and agrees with Anesthesia plan.  Questions answered. Anesthesia consent signed with patient.  ASA Score: 2     Day of Surgery Review of History & Physical:  There are no significant changes.      Anesthesia Plan Notes: Anesthesia consent to be signed prior to procedure on 11/19/19  Outside EKG in media section          Ready For Surgery From Anesthesia Perspective.

## 2019-11-13 NOTE — PRE-PROCEDURE INSTRUCTIONS
pt is arranging rid ehome    Allergies, medical, surgical, family and psychosocial histories reviewed with patient. Periop plan of care reviewed. Preop instructions given, including medications to take and to hold. Hibiclens soap and instructions on use given. Time allotted for questions to be addressed.  Patient verbalized understanding.

## 2019-11-13 NOTE — TELEPHONE ENCOUNTER
----- Message from Felecia Subramanian sent at 11/13/2019 12:26 PM CST -----  Please enter her surgical clearance in the [email or web address] system so the Surgeon can view it. Her surgery is on the 11/19. Pt #[phone]

## 2019-11-15 ENCOUNTER — TELEPHONE (OUTPATIENT)
Dept: FAMILY MEDICINE | Facility: CLINIC | Age: 64
End: 2019-11-15

## 2019-11-19 ENCOUNTER — HOSPITAL ENCOUNTER (INPATIENT)
Facility: HOSPITAL | Age: 64
LOS: 2 days | Discharge: HOME HEALTH | DRG: 460 | End: 2019-11-21
Attending: NEUROLOGICAL SURGERY | Admitting: NEUROLOGICAL SURGERY
Payer: MEDICARE

## 2019-11-19 ENCOUNTER — ANESTHESIA (OUTPATIENT)
Dept: SURGERY | Facility: HOSPITAL | Age: 64
DRG: 460 | End: 2019-11-19
Payer: MEDICARE

## 2019-11-19 DIAGNOSIS — M43.16 SPONDYLOLISTHESIS OF LUMBAR REGION: ICD-10-CM

## 2019-11-19 LAB — POCT GLUCOSE: 145 MG/DL (ref 70–110)

## 2019-11-19 PROCEDURE — 63600175 PHARM REV CODE 636 W HCPCS: Performed by: NURSE ANESTHETIST, CERTIFIED REGISTERED

## 2019-11-19 PROCEDURE — 36000710: Performed by: NEUROLOGICAL SURGERY

## 2019-11-19 PROCEDURE — 63600175 PHARM REV CODE 636 W HCPCS: Performed by: ANESTHESIOLOGY

## 2019-11-19 PROCEDURE — 11000001 HC ACUTE MED/SURG PRIVATE ROOM

## 2019-11-19 PROCEDURE — 63600175 PHARM REV CODE 636 W HCPCS: Performed by: PHYSICIAN ASSISTANT

## 2019-11-19 PROCEDURE — 63600175 PHARM REV CODE 636 W HCPCS: Performed by: NEUROLOGICAL SURGERY

## 2019-11-19 PROCEDURE — 22840 PR POSTERIOR NON-SEGMENTAL INSTRUMENTATION: ICD-10-PCS | Mod: ,,, | Performed by: NEUROLOGICAL SURGERY

## 2019-11-19 PROCEDURE — 94761 N-INVAS EAR/PLS OXIMETRY MLT: CPT

## 2019-11-19 PROCEDURE — 22558 PR ARTHRODESIS ANT INTERBODY MIN DISCECTOMY,LUMBAR: ICD-10-PCS | Mod: AS,,, | Performed by: PHYSICIAN ASSISTANT

## 2019-11-19 PROCEDURE — 63600175 PHARM REV CODE 636 W HCPCS: Performed by: NURSE PRACTITIONER

## 2019-11-19 PROCEDURE — 22853 PR INSERT BIOMECH DEV W/INTERBODY ARTHRODESIS, EA CONTIGUOUS DEFECT: ICD-10-PCS | Mod: ,,, | Performed by: NEUROLOGICAL SURGERY

## 2019-11-19 PROCEDURE — 27800903 OPTIME MED/SURG SUP & DEVICES OTHER IMPLANTS: Performed by: NEUROLOGICAL SURGERY

## 2019-11-19 PROCEDURE — 71000039 HC RECOVERY, EACH ADD'L HOUR: Performed by: NEUROLOGICAL SURGERY

## 2019-11-19 PROCEDURE — 22853 INSJ BIOMECHANICAL DEVICE: CPT | Mod: ,,, | Performed by: NEUROLOGICAL SURGERY

## 2019-11-19 PROCEDURE — 22853 PR INSERT BIOMECH DEV W/INTERBODY ARTHRODESIS, EA CONTIGUOUS DEFECT: ICD-10-PCS | Mod: AS,,, | Performed by: PHYSICIAN ASSISTANT

## 2019-11-19 PROCEDURE — 22840 INSERT SPINE FIXATION DEVICE: CPT | Mod: AS,,, | Performed by: PHYSICIAN ASSISTANT

## 2019-11-19 PROCEDURE — 20930 PR ALLOGRAFT FOR SPINE SURGERY ONLY MORSELIZED: ICD-10-PCS | Mod: ,,, | Performed by: NEUROLOGICAL SURGERY

## 2019-11-19 PROCEDURE — 22558 PR ARTHRODESIS ANT INTERBODY MIN DISCECTOMY,LUMBAR: ICD-10-PCS | Mod: ,,, | Performed by: NEUROLOGICAL SURGERY

## 2019-11-19 PROCEDURE — 37000008 HC ANESTHESIA 1ST 15 MINUTES: Performed by: NEUROLOGICAL SURGERY

## 2019-11-19 PROCEDURE — 22853 INSJ BIOMECHANICAL DEVICE: CPT | Mod: AS,,, | Performed by: PHYSICIAN ASSISTANT

## 2019-11-19 PROCEDURE — 22558 ARTHRD ANT NTRBD MIN DSC LUM: CPT | Mod: AS,,, | Performed by: PHYSICIAN ASSISTANT

## 2019-11-19 PROCEDURE — 20930 SP BONE ALGRFT MORSEL ADD-ON: CPT | Mod: ,,, | Performed by: NEUROLOGICAL SURGERY

## 2019-11-19 PROCEDURE — 25000003 PHARM REV CODE 250: Performed by: PHYSICIAN ASSISTANT

## 2019-11-19 PROCEDURE — 20936 PR AUTOGRAFT SPINE SURGERY LOCAL FROM SAME INCISION: ICD-10-PCS | Mod: ,,, | Performed by: NEUROLOGICAL SURGERY

## 2019-11-19 PROCEDURE — 25000003 PHARM REV CODE 250: Performed by: NEUROLOGICAL SURGERY

## 2019-11-19 PROCEDURE — 20936 SP BONE AGRFT LOCAL ADD-ON: CPT | Mod: ,,, | Performed by: NEUROLOGICAL SURGERY

## 2019-11-19 PROCEDURE — C1713 ANCHOR/SCREW BN/BN,TIS/BN: HCPCS | Performed by: NEUROLOGICAL SURGERY

## 2019-11-19 PROCEDURE — 27000221 HC OXYGEN, UP TO 24 HOURS

## 2019-11-19 PROCEDURE — 27201423 OPTIME MED/SURG SUP & DEVICES STERILE SUPPLY: Performed by: NEUROLOGICAL SURGERY

## 2019-11-19 PROCEDURE — 22558 ARTHRD ANT NTRBD MIN DSC LUM: CPT | Mod: ,,, | Performed by: NEUROLOGICAL SURGERY

## 2019-11-19 PROCEDURE — 25000003 PHARM REV CODE 250: Performed by: NURSE ANESTHETIST, CERTIFIED REGISTERED

## 2019-11-19 PROCEDURE — 94799 UNLISTED PULMONARY SVC/PX: CPT

## 2019-11-19 PROCEDURE — 22840 PR POSTERIOR NON-SEGMENTAL INSTRUMENTATION: ICD-10-PCS | Mod: AS,,, | Performed by: PHYSICIAN ASSISTANT

## 2019-11-19 PROCEDURE — 71000033 HC RECOVERY, INTIAL HOUR: Performed by: NEUROLOGICAL SURGERY

## 2019-11-19 PROCEDURE — 22840 INSERT SPINE FIXATION DEVICE: CPT | Mod: ,,, | Performed by: NEUROLOGICAL SURGERY

## 2019-11-19 PROCEDURE — 37000009 HC ANESTHESIA EA ADD 15 MINS: Performed by: NEUROLOGICAL SURGERY

## 2019-11-19 PROCEDURE — 36000711: Performed by: NEUROLOGICAL SURGERY

## 2019-11-19 PROCEDURE — C1729 CATH, DRAINAGE: HCPCS | Performed by: NEUROLOGICAL SURGERY

## 2019-11-19 DEVICE — CAP LOCKING CREO MIS: Type: IMPLANTABLE DEVICE | Site: SPINE LUMBAR | Status: FUNCTIONAL

## 2019-11-19 DEVICE — IMPLANTABLE DEVICE: Type: IMPLANTABLE DEVICE | Site: SPINE LUMBAR | Status: FUNCTIONAL

## 2019-11-19 DEVICE — ALLOGRAFT TRIFECTA 1CC: Type: IMPLANTABLE DEVICE | Site: SPINE LUMBAR | Status: FUNCTIONAL

## 2019-11-19 DEVICE — PUTTY KINEX BIOACTIVE 5CC: Type: IMPLANTABLE DEVICE | Site: SPINE LUMBAR | Status: FUNCTIONAL

## 2019-11-19 DEVICE — TULIP CREO MIS MOD POLY 30MM: Type: IMPLANTABLE DEVICE | Site: SPINE LUMBAR | Status: FUNCTIONAL

## 2019-11-19 RX ORDER — ONDANSETRON 2 MG/ML
INJECTION INTRAMUSCULAR; INTRAVENOUS
Status: DISCONTINUED | OUTPATIENT
Start: 2019-11-19 | End: 2019-11-19

## 2019-11-19 RX ORDER — SODIUM CHLORIDE 0.9 % (FLUSH) 0.9 %
3 SYRINGE (ML) INJECTION
Status: DISCONTINUED | OUTPATIENT
Start: 2019-11-19 | End: 2019-11-21 | Stop reason: HOSPADM

## 2019-11-19 RX ORDER — CELECOXIB 100 MG/1
200 CAPSULE ORAL 2 TIMES DAILY
Status: DISCONTINUED | OUTPATIENT
Start: 2019-11-19 | End: 2019-11-21 | Stop reason: HOSPADM

## 2019-11-19 RX ORDER — SODIUM CHLORIDE 9 MG/ML
INJECTION, SOLUTION INTRAVENOUS CONTINUOUS PRN
Status: DISCONTINUED | OUTPATIENT
Start: 2019-11-19 | End: 2019-11-19

## 2019-11-19 RX ORDER — ONDANSETRON 2 MG/ML
4 INJECTION INTRAMUSCULAR; INTRAVENOUS DAILY PRN
Status: DISCONTINUED | OUTPATIENT
Start: 2019-11-19 | End: 2019-11-21 | Stop reason: HOSPADM

## 2019-11-19 RX ORDER — GENTAMICIN SULFATE 80 MG/100ML
80 INJECTION, SOLUTION INTRAVENOUS
Status: DISCONTINUED | OUTPATIENT
Start: 2019-11-19 | End: 2019-11-19

## 2019-11-19 RX ORDER — EPHEDRINE SULFATE 50 MG/ML
INJECTION, SOLUTION INTRAVENOUS
Status: DISCONTINUED | OUTPATIENT
Start: 2019-11-19 | End: 2019-11-19

## 2019-11-19 RX ORDER — MAG HYDROX/ALUMINUM HYD/SIMETH 200-200-20
30 SUSPENSION, ORAL (FINAL DOSE FORM) ORAL EVERY 4 HOURS PRN
Status: DISCONTINUED | OUTPATIENT
Start: 2019-11-19 | End: 2019-11-21 | Stop reason: HOSPADM

## 2019-11-19 RX ORDER — GABAPENTIN 300 MG/1
300 CAPSULE ORAL 2 TIMES DAILY
Status: DISCONTINUED | OUTPATIENT
Start: 2019-11-19 | End: 2019-11-21 | Stop reason: HOSPADM

## 2019-11-19 RX ORDER — ACETAMINOPHEN 325 MG/1
650 TABLET ORAL EVERY 6 HOURS SCHEDULED
Status: DISCONTINUED | OUTPATIENT
Start: 2019-11-19 | End: 2019-11-21 | Stop reason: HOSPADM

## 2019-11-19 RX ORDER — LOSARTAN POTASSIUM 50 MG/1
100 TABLET ORAL DAILY
Status: DISCONTINUED | OUTPATIENT
Start: 2019-11-19 | End: 2019-11-21 | Stop reason: HOSPADM

## 2019-11-19 RX ORDER — ONDANSETRON 8 MG/1
8 TABLET, ORALLY DISINTEGRATING ORAL EVERY 6 HOURS PRN
Status: DISCONTINUED | OUTPATIENT
Start: 2019-11-19 | End: 2019-11-21 | Stop reason: HOSPADM

## 2019-11-19 RX ORDER — SUCCINYLCHOLINE CHLORIDE 20 MG/ML
INJECTION INTRAMUSCULAR; INTRAVENOUS
Status: DISCONTINUED | OUTPATIENT
Start: 2019-11-19 | End: 2019-11-19

## 2019-11-19 RX ORDER — LIDOCAINE HCL/PF 100 MG/5ML
SYRINGE (ML) INTRAVENOUS
Status: DISCONTINUED | OUTPATIENT
Start: 2019-11-19 | End: 2019-11-19

## 2019-11-19 RX ORDER — GLYCOPYRROLATE 0.2 MG/ML
INJECTION INTRAMUSCULAR; INTRAVENOUS
Status: DISCONTINUED | OUTPATIENT
Start: 2019-11-19 | End: 2019-11-19

## 2019-11-19 RX ORDER — LIDOCAINE HYDROCHLORIDE 10 MG/ML
1 INJECTION, SOLUTION EPIDURAL; INFILTRATION; INTRACAUDAL; PERINEURAL ONCE
Status: DISCONTINUED | OUTPATIENT
Start: 2019-11-19 | End: 2019-11-19 | Stop reason: HOSPADM

## 2019-11-19 RX ORDER — PROPOFOL 10 MG/ML
VIAL (ML) INTRAVENOUS
Status: DISCONTINUED | OUTPATIENT
Start: 2019-11-19 | End: 2019-11-19

## 2019-11-19 RX ORDER — PREGABALIN 75 MG/1
75 CAPSULE ORAL
Status: COMPLETED | OUTPATIENT
Start: 2019-11-19 | End: 2019-11-19

## 2019-11-19 RX ORDER — ROCURONIUM BROMIDE 10 MG/ML
INJECTION, SOLUTION INTRAVENOUS
Status: DISCONTINUED | OUTPATIENT
Start: 2019-11-19 | End: 2019-11-19

## 2019-11-19 RX ORDER — VANCOMYCIN HYDROCHLORIDE 1 G/20ML
INJECTION, POWDER, LYOPHILIZED, FOR SOLUTION INTRAVENOUS
Status: DISCONTINUED | OUTPATIENT
Start: 2019-11-19 | End: 2019-11-19 | Stop reason: HOSPADM

## 2019-11-19 RX ORDER — CYCLOBENZAPRINE HCL 10 MG
10 TABLET ORAL
Status: COMPLETED | OUTPATIENT
Start: 2019-11-19 | End: 2019-11-19

## 2019-11-19 RX ORDER — VANCOMYCIN HCL IN 5 % DEXTROSE 1G/250ML
1000 PLASTIC BAG, INJECTION (ML) INTRAVENOUS
Status: DISCONTINUED | OUTPATIENT
Start: 2019-11-19 | End: 2019-11-19

## 2019-11-19 RX ORDER — AMOXICILLIN 250 MG
2 CAPSULE ORAL NIGHTLY PRN
Status: DISCONTINUED | OUTPATIENT
Start: 2019-11-19 | End: 2019-11-21 | Stop reason: HOSPADM

## 2019-11-19 RX ORDER — OXYCODONE HCL 10 MG/1
10 TABLET, FILM COATED, EXTENDED RELEASE ORAL
Status: COMPLETED | OUTPATIENT
Start: 2019-11-19 | End: 2019-11-19

## 2019-11-19 RX ORDER — SODIUM CHLORIDE, SODIUM LACTATE, POTASSIUM CHLORIDE, CALCIUM CHLORIDE 600; 310; 30; 20 MG/100ML; MG/100ML; MG/100ML; MG/100ML
INJECTION, SOLUTION INTRAVENOUS CONTINUOUS
Status: DISCONTINUED | OUTPATIENT
Start: 2019-11-19 | End: 2019-11-19

## 2019-11-19 RX ORDER — NEOMYCIN AND POLYMYXIN B SULFATES AND BACITRACIN ZINC 400; 3.5; 1 [USP'U]/G; MG/G; [USP'U]/G
OINTMENT OPHTHALMIC
Status: DISCONTINUED | OUTPATIENT
Start: 2019-11-19 | End: 2019-11-19 | Stop reason: HOSPADM

## 2019-11-19 RX ORDER — PHENYLEPHRINE HYDROCHLORIDE 10 MG/ML
INJECTION INTRAVENOUS
Status: DISCONTINUED | OUTPATIENT
Start: 2019-11-19 | End: 2019-11-19

## 2019-11-19 RX ORDER — BUPIVACAINE HCL/EPINEPHRINE 0.5-1:200K
VIAL (ML) INJECTION
Status: DISCONTINUED | OUTPATIENT
Start: 2019-11-19 | End: 2019-11-19 | Stop reason: HOSPADM

## 2019-11-19 RX ORDER — SODIUM CHLORIDE AND POTASSIUM CHLORIDE 150; 900 MG/100ML; MG/100ML
INJECTION, SOLUTION INTRAVENOUS CONTINUOUS
Status: DISCONTINUED | OUTPATIENT
Start: 2019-11-19 | End: 2019-11-20

## 2019-11-19 RX ORDER — ATORVASTATIN CALCIUM 20 MG/1
20 TABLET, FILM COATED ORAL NIGHTLY
Status: DISCONTINUED | OUTPATIENT
Start: 2019-11-19 | End: 2019-11-21 | Stop reason: HOSPADM

## 2019-11-19 RX ORDER — BACITRACIN 50000 [IU]/1
INJECTION, POWDER, FOR SOLUTION INTRAMUSCULAR
Status: DISCONTINUED | OUTPATIENT
Start: 2019-11-19 | End: 2019-11-19 | Stop reason: HOSPADM

## 2019-11-19 RX ORDER — FENTANYL CITRATE 50 UG/ML
INJECTION, SOLUTION INTRAMUSCULAR; INTRAVENOUS
Status: DISCONTINUED | OUTPATIENT
Start: 2019-11-19 | End: 2019-11-19

## 2019-11-19 RX ORDER — OXYCODONE HYDROCHLORIDE 5 MG/1
5 TABLET ORAL ONCE
Status: COMPLETED | OUTPATIENT
Start: 2019-11-19 | End: 2019-11-19

## 2019-11-19 RX ORDER — ACETAMINOPHEN 500 MG
1000 TABLET ORAL
Status: COMPLETED | OUTPATIENT
Start: 2019-11-19 | End: 2019-11-19

## 2019-11-19 RX ORDER — DEXAMETHASONE SODIUM PHOSPHATE 4 MG/ML
INJECTION, SOLUTION INTRA-ARTICULAR; INTRALESIONAL; INTRAMUSCULAR; INTRAVENOUS; SOFT TISSUE
Status: DISCONTINUED | OUTPATIENT
Start: 2019-11-19 | End: 2019-11-19

## 2019-11-19 RX ORDER — HEPARIN SODIUM 5000 [USP'U]/ML
5000 INJECTION, SOLUTION INTRAVENOUS; SUBCUTANEOUS EVERY 8 HOURS
Status: DISCONTINUED | OUTPATIENT
Start: 2019-11-19 | End: 2019-11-21 | Stop reason: HOSPADM

## 2019-11-19 RX ORDER — MIDAZOLAM HYDROCHLORIDE 1 MG/ML
INJECTION, SOLUTION INTRAMUSCULAR; INTRAVENOUS
Status: DISCONTINUED | OUTPATIENT
Start: 2019-11-19 | End: 2019-11-19

## 2019-11-19 RX ORDER — ACETAMINOPHEN 325 MG/1
650 TABLET ORAL EVERY 6 HOURS PRN
Status: DISCONTINUED | OUTPATIENT
Start: 2019-11-19 | End: 2019-11-21 | Stop reason: HOSPADM

## 2019-11-19 RX ORDER — HYDROMORPHONE HYDROCHLORIDE 2 MG/ML
0.5 INJECTION, SOLUTION INTRAMUSCULAR; INTRAVENOUS; SUBCUTANEOUS EVERY 5 MIN PRN
Status: DISPENSED | OUTPATIENT
Start: 2019-11-19 | End: 2019-11-19

## 2019-11-19 RX ORDER — TRAMADOL HYDROCHLORIDE 50 MG/1
100 TABLET ORAL EVERY 6 HOURS SCHEDULED
Status: DISCONTINUED | OUTPATIENT
Start: 2019-11-19 | End: 2019-11-21 | Stop reason: HOSPADM

## 2019-11-19 RX ORDER — ACETAMINOPHEN 10 MG/ML
1000 INJECTION, SOLUTION INTRAVENOUS ONCE
Status: ACTIVE | OUTPATIENT
Start: 2019-11-19 | End: 2019-11-20

## 2019-11-19 RX ORDER — METHOCARBAMOL 750 MG/1
750 TABLET, FILM COATED ORAL 3 TIMES DAILY
Status: DISCONTINUED | OUTPATIENT
Start: 2019-11-19 | End: 2019-11-21 | Stop reason: HOSPADM

## 2019-11-19 RX ORDER — BISACODYL 10 MG
10 SUPPOSITORY, RECTAL RECTAL DAILY PRN
Status: DISCONTINUED | OUTPATIENT
Start: 2019-11-19 | End: 2019-11-21 | Stop reason: HOSPADM

## 2019-11-19 RX ORDER — DIPHENHYDRAMINE HYDROCHLORIDE 50 MG/ML
12.5 INJECTION INTRAMUSCULAR; INTRAVENOUS EVERY 6 HOURS PRN
Status: DISCONTINUED | OUTPATIENT
Start: 2019-11-19 | End: 2019-11-21 | Stop reason: HOSPADM

## 2019-11-19 RX ORDER — CELECOXIB 100 MG/1
200 CAPSULE ORAL
Status: COMPLETED | OUTPATIENT
Start: 2019-11-19 | End: 2019-11-19

## 2019-11-19 RX ORDER — HYDROMORPHONE HYDROCHLORIDE 2 MG/ML
2 INJECTION, SOLUTION INTRAMUSCULAR; INTRAVENOUS; SUBCUTANEOUS
Status: DISCONTINUED | OUTPATIENT
Start: 2019-11-19 | End: 2019-11-21 | Stop reason: HOSPADM

## 2019-11-19 RX ORDER — HYDROCHLOROTHIAZIDE 12.5 MG/1
12.5 TABLET ORAL DAILY
Status: DISCONTINUED | OUTPATIENT
Start: 2019-11-19 | End: 2019-11-21 | Stop reason: HOSPADM

## 2019-11-19 RX ORDER — PANTOPRAZOLE SODIUM 40 MG/1
40 TABLET, DELAYED RELEASE ORAL DAILY
Status: DISCONTINUED | OUTPATIENT
Start: 2019-11-20 | End: 2019-11-21 | Stop reason: HOSPADM

## 2019-11-19 RX ORDER — MUPIROCIN 20 MG/G
1 OINTMENT TOPICAL 2 TIMES DAILY
Status: DISCONTINUED | OUTPATIENT
Start: 2019-11-19 | End: 2019-11-21 | Stop reason: HOSPADM

## 2019-11-19 RX ORDER — PROPOFOL 10 MG/ML
VIAL (ML) INTRAVENOUS CONTINUOUS PRN
Status: DISCONTINUED | OUTPATIENT
Start: 2019-11-19 | End: 2019-11-19

## 2019-11-19 RX ORDER — TALC
6 POWDER (GRAM) TOPICAL NIGHTLY PRN
Status: DISCONTINUED | OUTPATIENT
Start: 2019-11-19 | End: 2019-11-21 | Stop reason: HOSPADM

## 2019-11-19 RX ADMIN — SODIUM CHLORIDE: 0.9 INJECTION, SOLUTION INTRAVENOUS at 11:11

## 2019-11-19 RX ADMIN — CYCLOBENZAPRINE 10 MG: 10 TABLET, FILM COATED ORAL at 09:11

## 2019-11-19 RX ADMIN — SUCCINYLCHOLINE CHLORIDE 120 MG: 20 INJECTION, SOLUTION INTRAMUSCULAR; INTRAVENOUS at 11:11

## 2019-11-19 RX ADMIN — HYDROMORPHONE HYDROCHLORIDE 0.5 MG: 2 INJECTION, SOLUTION INTRAMUSCULAR; INTRAVENOUS; SUBCUTANEOUS at 04:11

## 2019-11-19 RX ADMIN — VANCOMYCIN HYDROCHLORIDE 1000 MG: 1 INJECTION, POWDER, LYOPHILIZED, FOR SOLUTION INTRAVENOUS at 11:11

## 2019-11-19 RX ADMIN — LIDOCAINE HYDROCHLORIDE 60 MG: 20 INJECTION, SOLUTION INTRAVENOUS at 11:11

## 2019-11-19 RX ADMIN — HYDROMORPHONE HYDROCHLORIDE 2 MG: 2 INJECTION, SOLUTION INTRAMUSCULAR; INTRAVENOUS; SUBCUTANEOUS at 09:11

## 2019-11-19 RX ADMIN — EPHEDRINE SULFATE 10 MG: 50 INJECTION, SOLUTION INTRAMUSCULAR; INTRAVENOUS; SUBCUTANEOUS at 12:11

## 2019-11-19 RX ADMIN — GENTAMICIN SULFATE 80 MG: 80 INJECTION, SOLUTION INTRAVENOUS at 11:11

## 2019-11-19 RX ADMIN — PROPOFOL 100 MCG/KG/MIN: 10 INJECTION, EMULSION INTRAVENOUS at 11:11

## 2019-11-19 RX ADMIN — MIDAZOLAM 2 MG: 1 INJECTION INTRAMUSCULAR; INTRAVENOUS at 11:11

## 2019-11-19 RX ADMIN — GABAPENTIN 300 MG: 300 CAPSULE ORAL at 09:11

## 2019-11-19 RX ADMIN — FENTANYL CITRATE 100 MCG: 50 INJECTION, SOLUTION INTRAMUSCULAR; INTRAVENOUS at 12:11

## 2019-11-19 RX ADMIN — CELECOXIB 200 MG: 100 CAPSULE ORAL at 09:11

## 2019-11-19 RX ADMIN — ACETAMINOPHEN 1000 MG: 500 TABLET ORAL at 09:11

## 2019-11-19 RX ADMIN — EPHEDRINE SULFATE 10 MG: 50 INJECTION, SOLUTION INTRAMUSCULAR; INTRAVENOUS; SUBCUTANEOUS at 02:11

## 2019-11-19 RX ADMIN — SODIUM CHLORIDE AND POTASSIUM CHLORIDE: 9; 1.49 INJECTION, SOLUTION INTRAVENOUS at 06:11

## 2019-11-19 RX ADMIN — PROPOFOL 200 MG: 10 INJECTION, EMULSION INTRAVENOUS at 11:11

## 2019-11-19 RX ADMIN — TRAMADOL HYDROCHLORIDE 100 MG: 50 TABLET, FILM COATED ORAL at 06:11

## 2019-11-19 RX ADMIN — SODIUM CHLORIDE, SODIUM LACTATE, POTASSIUM CHLORIDE, AND CALCIUM CHLORIDE: .6; .31; .03; .02 INJECTION, SOLUTION INTRAVENOUS at 02:11

## 2019-11-19 RX ADMIN — FENTANYL CITRATE 50 MCG: 50 INJECTION, SOLUTION INTRAMUSCULAR; INTRAVENOUS at 02:11

## 2019-11-19 RX ADMIN — FENTANYL CITRATE 50 MCG: 50 INJECTION, SOLUTION INTRAMUSCULAR; INTRAVENOUS at 12:11

## 2019-11-19 RX ADMIN — LOSARTAN POTASSIUM 100 MG: 50 TABLET ORAL at 09:11

## 2019-11-19 RX ADMIN — HEPARIN SODIUM 5000 UNITS: 5000 INJECTION, SOLUTION INTRAVENOUS; SUBCUTANEOUS at 09:11

## 2019-11-19 RX ADMIN — PHENYLEPHRINE HYDROCHLORIDE 100 MCG: 10 INJECTION INTRAVENOUS at 12:11

## 2019-11-19 RX ADMIN — MUPIROCIN 1 G: 20 OINTMENT TOPICAL at 09:11

## 2019-11-19 RX ADMIN — PHENYLEPHRINE HYDROCHLORIDE 200 MCG: 10 INJECTION INTRAVENOUS at 12:11

## 2019-11-19 RX ADMIN — HYDROMORPHONE HYDROCHLORIDE 0.5 MG: 2 INJECTION, SOLUTION INTRAMUSCULAR; INTRAVENOUS; SUBCUTANEOUS at 03:11

## 2019-11-19 RX ADMIN — ROCURONIUM BROMIDE 5 MG: 10 INJECTION, SOLUTION INTRAVENOUS at 11:11

## 2019-11-19 RX ADMIN — ONDANSETRON 4 MG: 2 INJECTION, SOLUTION INTRAMUSCULAR; INTRAVENOUS at 02:11

## 2019-11-19 RX ADMIN — METHOCARBAMOL TABLETS 750 MG: 750 TABLET, COATED ORAL at 09:11

## 2019-11-19 RX ADMIN — DEXAMETHASONE SODIUM PHOSPHATE 8 MG: 4 INJECTION, SOLUTION INTRAMUSCULAR; INTRAVENOUS at 12:11

## 2019-11-19 RX ADMIN — ATORVASTATIN CALCIUM 20 MG: 20 TABLET, FILM COATED ORAL at 09:11

## 2019-11-19 RX ADMIN — HYDROCHLOROTHIAZIDE 12.5 MG: 12.5 TABLET ORAL at 09:11

## 2019-11-19 RX ADMIN — ACETAMINOPHEN 650 MG: 325 TABLET ORAL at 06:11

## 2019-11-19 RX ADMIN — GLYCOPYRROLATE 0.2 MG: 0.2 INJECTION, SOLUTION INTRAMUSCULAR; INTRAVENOUS at 12:11

## 2019-11-19 RX ADMIN — PREGABALIN 75 MG: 75 CAPSULE ORAL at 09:11

## 2019-11-19 RX ADMIN — PROPOFOL 125 MCG/KG/MIN: 10 INJECTION, EMULSION INTRAVENOUS at 12:11

## 2019-11-19 RX ADMIN — OXYCODONE HYDROCHLORIDE 5 MG: 5 TABLET ORAL at 03:11

## 2019-11-19 RX ADMIN — SODIUM CHLORIDE, SODIUM LACTATE, POTASSIUM CHLORIDE, AND CALCIUM CHLORIDE: .6; .31; .03; .02 INJECTION, SOLUTION INTRAVENOUS at 09:11

## 2019-11-19 RX ADMIN — OXYCODONE HYDROCHLORIDE 10 MG: 10 TABLET, FILM COATED, EXTENDED RELEASE ORAL at 09:11

## 2019-11-19 NOTE — H&P
"NEUROSURGICAL PROGRESS NOTE     DATE OF SERVICE:  11/19/2019     ATTENDING PHYSICIAN:  Ramos Miller MD     SUBJECTIVE:     INTERIM HISTORY:     This is a very pleasant 63 y.o. female, who is status post placement of the inter spinous spacer in an outside facility by pain management in February 2019 for lateral recess stenosis with radiculopathy.  After the surgery she was able to stand more straight but 1 month later she started having more back pain.  She feels a sensation of instability in her back.  She feels that her back is "popping" more.  Pain is constant but worse with activity such as walking, biking, twisting. Back pain is moderate at this time but interfering with her QOL. She did 3-4 months of PT in the past.         Low Back Pain Scale  R Low Back-Pain Score: 3  R Low Back-Pain Intensity: Pain killers give moderate relief from pain  R Low Back-Pain Score: It is painful to look after myself and I am slow and careful  Low Back-Lifting: Pain prevents me from lifting heavy weights off the floor, but I can manage if they are conveniently positioned for example on a table   Low Back-Walking: Pain prevents me walking more than .25 mile   Low Back-Sitting: Pain prevents me from sitting more than 1 hour   Low Back-Standing: I cannot stand for longer than 30 mins without increasing pain   Low Back-Sleeping: I have pain in bed but it does not prevent me from sleeping well   Low Back-Social Life: Pain has no significant effect on my social life apart from limiting my more en   Low Back-Traveling: Pain restricts me to short necessary journeys under 30 minutes   Low Back-Changing Degree of Pain: My pain is gradually worsening        PAST MEDICAL HISTORY:       Active Ambulatory Problems     Diagnosis Date Noted    No Active Ambulatory Problems           Resolved Ambulatory Problems     Diagnosis Date Noted    No Resolved Ambulatory Problems      No Additional Past Medical History         PAST SURGICAL " HISTORY:  No past surgical history on file.     SOCIAL HISTORY:   Social History               Socioeconomic History    Marital status:        Spouse name: Not on file    Number of children: Not on file    Years of education: Not on file    Highest education level: Not on file   Occupational History    Not on file   Social Needs    Financial resource strain: Not on file    Food insecurity:       Worry: Not on file       Inability: Not on file    Transportation needs:       Medical: Not on file       Non-medical: Not on file   Tobacco Use    Smoking status: Former Smoker    Smokeless tobacco: Former User       Quit date: 3/10/2001    Tobacco comment: 03/10/2001-   Substance and Sexual Activity    Alcohol use: Never       Frequency: Never    Drug use: Never    Sexual activity: Not on file   Lifestyle    Physical activity:       Days per week: Not on file       Minutes per session: Not on file    Stress: Not on file   Relationships    Social connections:       Talks on phone: Not on file       Gets together: Not on file       Attends Baptism service: Not on file       Active member of club or organization: Not on file       Attends meetings of clubs or organizations: Not on file       Relationship status: Not on file   Other Topics Concern    Not on file   Social History Narrative    Not on file            FAMILY HISTORY:  No family history on file.     CURRENTS MEDICATIONS:         Current Outpatient Medications on File Prior to Visit   Medication Sig Dispense Refill    atorvastatin (LIPITOR) 20 MG tablet TK 1 T PO ONCE D   1    DUREZOL 0.05 % Drop ophthalmic solution INT 1 GTT IN OS QID   1    gabapentin (NEURONTIN) 300 MG capsule TK ONE C PO ONCE A DAY   5    ibuprofen (ADVIL,MOTRIN) 600 MG tablet TK 1 T PO TID WF OR MILK PRN   0    losartan-hydrochlorothiazide 100-12.5 mg (HYZAAR) 100-12.5 mg Tab TK 1 T PO ONCE A DAY   1    PROLENSA 0.07 % Drop INSTILL 1 DROP INTO THE RIGHT EYE TID    1      No current facility-administered medications on file prior to visit.          ALLERGIES:  Review of patient's allergies indicates:  No Known Allergies     REVIEW OF SYSTEMS:  Review of Systems   Constitutional: Negative for diaphoresis, fever and weight loss.   Respiratory: Negative for shortness of breath.    Cardiovascular: Negative for chest pain.   Gastrointestinal: Negative for blood in stool.   Genitourinary: Negative for hematuria.   Endo/Heme/Allergies: Does not bruise/bleed easily.   All other systems reviewed and are negative.           OBJECTIVE:     PHYSICAL EXAMINATION:       Vitals:     09/10/19 0810   BP: (!) 153/72   Pulse: 60         Physical Exam:  Vitals reviewed.     Constitutional: She appears well-developed and well-nourished.      Eyes: Pupils are equal, round, and reactive to light. Conjunctivae and EOM are normal.      Cardiovascular: Normal distal pulses and no edema.      Abdominal: Soft.      Skin: Skin displays no rash on trunk and no rash on extremities. Skin displays no lesions on trunk and no lesions on extremities.     Psych/Behavior: She is alert. She is oriented to person, place, and time. She has a normal mood and affect.     Musculoskeletal:        Neck: Range of motion is full.     Neurological:        DTRs: Tricep reflexes are 2+ on the right side and 2+ on the left side. Bicep reflexes are 2+ on the right side and 2+ on the left side. Brachioradialis reflexes are 2+ on the right side and 2+ on the left side. Patellar reflexes are 2+ on the right side and 2+ on the left side. Achilles reflexes are 2+ on the right side and 2+ on the left side.         Back Exam      Tenderness   The patient is experiencing no tenderness.      Range of Motion   Extension: abnormal   Flexion: abnormal   Lateral bend right: abnormal   Lateral bend left: abnormal   Rotation right: abnormal   Rotation left: abnormal      Muscle Strength   Right Quadriceps:  5/5   Left Quadriceps:  5/5    Right Hamstrings:  5/5   Left Hamstrings:  5/5      Tests   Straight leg raise right: negative  Straight leg raise left: negative     Other   Toe walk: normal  Heel walk: normal                       Neurologic Exam      Mental Status   Oriented to person, place, and time.   Speech: speech is normal   Level of consciousness: alert     Cranial Nerves   Cranial nerves II through XII intact.      CN III, IV, VI   Pupils are equal, round, and reactive to light.  Extraocular motions are normal.      Motor Exam   Muscle bulk: normal  Overall muscle tone: normal     Strength   Right deltoid: 5/5  Left deltoid: 5/5  Right biceps: 5/5  Left biceps: 5/5  Right triceps: 5/5  Left triceps: 5/5  Right wrist flexion: 5/5  Left wrist flexion: 5/5  Right wrist extension: 5/5  Left wrist extension: 5/5  Right interossei: 5/5  Left interossei: 5/5  Right iliopsoas: 5/5  Left iliopsoas: 5/5  Right quadriceps: 5/5  Left quadriceps: 5/5  Right hamstrin/5  Left hamstrin/5  Right anterior tibial: 5/5  Left anterior tibial: 5/5  Right posterior tibial: 5/5  Left posterior tibial: 5/5  Right peroneal: 5/5  Left peroneal: 5/5  Right gastroc: 5/5  Left gastroc: 5/5     Sensory Exam   Light touch normal.   Pinprick normal.      Gait, Coordination, and Reflexes      Gait  Gait: normal     Coordination   Finger to nose coordination: normal  Tandem walking coordination: normal     Reflexes   Right brachioradialis: 2+  Left brachioradialis: 2+  Right biceps: 2+  Left biceps: 2+  Right triceps: 2+  Left triceps: 2+  Right patellar: 2+  Left patellar: 2+  Right achilles: 2+  Left achilles: 2+  Right plantar: normal  Left plantar: normal  Right Overton: absent  Left Overton: absent  Right ankle clonus: absent  Left ankle clonus: absent           DIAGNOSTIC DATA:  I personally interpreted the following imaging:   Lumbar spine MRI 2019: L4-5 grade one spondylolisthesis with bilateral lateral recess stenosis  Lumbar XR today: worsening L4-5  spondylolisthesis     ASSESMENT:  This is a 63 y.o. female with          Problem List Items Addressed This Visit      None               Visit Diagnoses      Chronic bilateral low back pain with bilateral sciatica    -  Primary     Relevant Orders     X-Ray Lumbar Spine Ap Lateral w/Flex Ext     Spondylolisthesis at L4-L5 level         Bilateral stenosis of lateral recess of lumbar spine              Acquired spinal instability after placement of interspinous process     PLAN:  I explained the natural history of the disease and all treatment options. I recommended a left L4-5 oblique interbody fusion placement interbody spacer using allograft, L4-5 posterior instrumentation, removal of interspinous process.      We have discussed the risks of surgery including bleeding, infection, failure of surgery, CSF leak, nerve root injury, spinal cord injury, ureter injury, weakness, paralysis, peripheral neuropathy, malplaced hardware, migration of hardware, non-union, need for reoperation. Patient understands the risks and would like to proceed with surgery.                   Ramos Miller MD  Cell:[phone]

## 2019-11-19 NOTE — PROGRESS NOTES
Certification of Assistant at Surgery       Surgery Date: 11/19/2019     Participating Surgeons:  Surgeon(s) and Role:     * Ramos Miller MD - Primary     * Marshal Meyers PA-C - Assisting    Procedures:  Procedure(s) (LRB):  FUSION, SPINE, LUMBAR Procedure: Stg 1: Left L4-5 oblique interbody fusion (Interbody spacer, allograft) / Stg 2:L4-5 Posterior instrumentation (Left)    Assistant Surgeon's Certification of Necessity:  I understand that section 1842 (b) (6) (d) of the Social Security Act generally prohibits Medicare Part B reasonable charge payment for the services of assistants at surgery in teaching hospitals when qualified residents are available to furnish such services. I certify that the services for which payment is claimed were medically necessary, and that no qualified resident was available to perform the services. I further understand that these services are subject to post-payment review by the Medicare carrier.      Marshal Meyers PA-C    11/19/2019  2:57 PM

## 2019-11-19 NOTE — NURSING
Patient is an admit from PACU.  Patient is awake and alert.  Complained of dry mouth.  Water given.  ANTHONY Drain is intact and site is intact with gauze and Tegaderm.  Dressings to back X2 are intact with no active drainage noted.  Has complaints of mild and tolerable pain at this time.  O2 per nasal cannula in use. Instructed to call for any assistance and patient verbalized understanding.  Bed alarm on.  Safety is maintained with bed low, wheels locked and side rails up.  Call light within reach.  Will continue to monitor.

## 2019-11-19 NOTE — TRANSFER OF CARE
"Anesthesia Transfer of Care Note    Patient: Sandrita Kaiser    Procedure(s) Performed: Procedure(s) (LRB):  FUSION, SPINE, LUMBAR Procedure: Stg 1: Left L4-5 oblique interbody fusion (Interbody spacer, allograft) / Stg 2:L4-5 Posterior instrumentation (Left)    Patient location: PACU    Anesthesia Type: general    Transport from OR: Transported from OR on 6-10 L/min O2 by face mask with adequate spontaneous ventilation    Post pain: adequate analgesia    Post assessment: no apparent anesthetic complications and tolerated procedure well    Post vital signs: stable    Level of consciousness: awake, alert and oriented    Nausea/Vomiting: no nausea/vomiting    Complications: none    Transfer of care protocol was followed      Last vitals:   Visit Vitals  BP (!) 140/65   Pulse 104   Temp 36.8 °C (98.2 °F) (Temporal)   Resp (!) 59   Ht 5' 5" (1.651 m)   Wt 98 kg (216 lb)   SpO2 (!) 94%   BMI 35.94 kg/m²     "

## 2019-11-19 NOTE — PLAN OF CARE
Patient sleeping, arousable. VSS. C/o pain, previously medicated. Dr. Trini Bowers to release patient from PACU. Patients family updated on patients room assignment.Instructed family to leave back brace at hospital. Report to Valeria DICKERSON with time for questions, verbalized understanding. Patient discharged to floor via stretcher.

## 2019-11-19 NOTE — OP NOTE
DATE OF PROCEDURE: 11/19/2019     PREOPERATIVE DIAGNOSES:  1. L4-5 degenerative spondylolisthesis  2. L4-5 facet arthropathy  3. Mechanical low back pain  4. L4-5 degenerative disc disease  5. L4-5 severe lateral recess stenosis       POSTOPERATIVE DIAGNOSES:  1. L4-5 degenerative spondylolisthesis  2. L4-5 facet arthropathy  3. Mechanical low back pain  4. L4-5 degenerative disc disease  5. L4-5 severe lateral recess stenosis        PROCEDURES:  1. Left anterior to the psoas minimally invasive access to the lumbar spine  2. Anterior longitudinal ligament release  3. L4-5 oblique interbody fusion with placement of expandable hyperlordotic Rise-L cage post-filled with allograft Trifecta and Kinex and allograft bone marrow taken from a separate incision  4. Posterior instrumentation at L4-5  with Globus Creo MIS Screw System.  5. Intraoperative neuromonitoring  6. Fluoroscopy      PRIMARY SURGEON: Ramos Miller M.D.      ASSISTANT SURGEON: Marshal GANDHI was the 1st assistant for this procedure as there was no qualified resident available to assist.        INDICATIONS: This is a 64-year-old woman who was found to have L4-5 degenerative disc disease, L4-5 spondylolisthesis, chronic low back pain and radiculopathy.  She had a prior L4-5 motion preservation interspinous device installed in at outside hospital but did not have significant pain relief from that procedure.  Risks included paralysis, leg pain, low back pain, CSF leak, screw malpositioning, hardware failure or migration, reoperation, medical complications such as MI, pneumonia and death. The patient consented for surgery.         DESCRIPTION OF THE PROCEDURE:   The patient was intubated under general anesthesia.  She was placed in lateral decubitus right side down on the sliding table. All the pressure points were carefully padded. Fluoroscopic localization was then utilized to identify the L4-5 level. An oblique incision measuring 4 cm  was then planned using fluoroscopy in front of the iliac crest at L4-5. The patient was prepped and draped in the typical sterile fashion. After injection local anesthesia with Lidocaine with epi, and incision was made with a #15 blade. The subcutaneous tissue was then dissected bluntly. Hemostasis was carried out with the bipolar. The external oblique, internal oblique and transversalis were then dissected bluntly.     Through a separate incision we cannulated the left, anterior superior iliac spine inside the ala and aspirated 5 cc of bone marrow.    Using fluoroscopy, we then localized the L4-5 level. Serial dilators were then passed through the initial probe and a final retractor was placed. Under direct look the disk annulus was opened and diskectomy was carried out using rongeurs. A Moore elevator was then inserted in the disk space and the contralateral annulus was opened to increase the disk height at that level. The discectomy was completed using serial shalom and the endplates were scrapped. We the dissected the anterior pre-vertebral space and placed a manual retractor to protect the iliac vessels. We then divided the ALL.  We were able to insert in the L4-5 disc space a 11 mm tall trial. Once the endplates were cleaned from cartilage and decorticated we placed an expandable 10 degrees lordotic 10 x 17 mm x 22 x 55 mm cage post-filled with allograft Trifecta and Kinex mixed with bone marrow autograft.     Hemostasis in the psoas muscle and the retractor was removed under direct look.       We irrigated. We added 0.5 g on vancomycin powder. The retroperitoneal space was closed by closing the external oblique fascia with 0 Vicryl. The subcutaneous layer was closed with 2-0 Vicryl inverted suture. The skin was closed with staples. The wound was dressed.      He was then transferred prone on the Tolu table frame. All pressure points   were carefully padded.      The patient was prepped and draped in a  typical sterile fashion. Using fluoroscopy, 2 incisions measuring 30mm were planned bilaterally between L4 to L5, 55-60 mm left and right to the midline and the incision were made with a #15 blade. Subcutaneous tissue hemostasis was completed.      Under dual fluoroscopic guidance and using the JamShidi needles, we cannulated the pedicles of the L4, L5 vertebrae bilaterally. We placed k-wires in each pedicle and the k-wires were clamped to the drape.       Then after dilating the fascia and tapping 6.5 x 50 mm pedicle screws were inserted with fluoroscopy at L4, 6.5 x 50 mm pedicle screws were inserted with fluoroscopy at L5. Pre-contoured titanium rods were inserted and the screw caps were placed on the reduction towers. We compressed the tower below the fulcrum to achieve more lordosis. The screw caps were tighten manually and torqued.          The thoracolumbar fascia was closed with interrupted 0 Vicryl. The subcutaneous layer was closed with inverted 2-0 Vicryl and the skin was closed with staples.       The wounds were then dressed. The patient was then sent to recovery under the care of the anesthesiologist. The blood loss was 300 mL. The final count was completed and nothing was missing. There was no complication. The patient tolerated well the procedure.

## 2019-11-20 LAB
ANION GAP SERPL CALC-SCNC: 10 MMOL/L (ref 8–16)
BASOPHILS # BLD AUTO: 0 K/UL (ref 0–0.2)
BASOPHILS NFR BLD: 0 % (ref 0–1.9)
BUN SERPL-MCNC: 21 MG/DL (ref 8–23)
CALCIUM SERPL-MCNC: 9.2 MG/DL (ref 8.7–10.5)
CHLORIDE SERPL-SCNC: 104 MMOL/L (ref 95–110)
CO2 SERPL-SCNC: 26 MMOL/L (ref 23–29)
CREAT SERPL-MCNC: 1.1 MG/DL (ref 0.5–1.4)
DIFFERENTIAL METHOD: ABNORMAL
EOSINOPHIL # BLD AUTO: 0 K/UL (ref 0–0.5)
EOSINOPHIL NFR BLD: 0 % (ref 0–8)
ERYTHROCYTE [DISTWIDTH] IN BLOOD BY AUTOMATED COUNT: 15.3 % (ref 11.5–14.5)
EST. GFR  (AFRICAN AMERICAN): >60 ML/MIN/1.73 M^2
EST. GFR  (NON AFRICAN AMERICAN): 52 ML/MIN/1.73 M^2
GLUCOSE SERPL-MCNC: 114 MG/DL (ref 70–110)
HCT VFR BLD AUTO: 33.1 % (ref 37–48.5)
HGB BLD-MCNC: 10.5 G/DL (ref 12–16)
LYMPHOCYTES # BLD AUTO: 1.2 K/UL (ref 1–4.8)
LYMPHOCYTES NFR BLD: 10.4 % (ref 18–48)
MCH RBC QN AUTO: 27.9 PG (ref 27–31)
MCHC RBC AUTO-ENTMCNC: 31.7 G/DL (ref 32–36)
MCV RBC AUTO: 88 FL (ref 82–98)
MONOCYTES # BLD AUTO: 0.8 K/UL (ref 0.3–1)
MONOCYTES NFR BLD: 7.6 % (ref 4–15)
NEUTROPHILS # BLD AUTO: 9.1 K/UL (ref 1.8–7.7)
NEUTROPHILS NFR BLD: 82 % (ref 38–73)
PLATELET # BLD AUTO: 277 K/UL (ref 150–350)
PMV BLD AUTO: 8.9 FL (ref 9.2–12.9)
POTASSIUM SERPL-SCNC: 4.8 MMOL/L (ref 3.5–5.1)
RBC # BLD AUTO: 3.77 M/UL (ref 4–5.4)
SODIUM SERPL-SCNC: 140 MMOL/L (ref 136–145)
WBC # BLD AUTO: 11.11 K/UL (ref 3.9–12.7)

## 2019-11-20 PROCEDURE — 36415 COLL VENOUS BLD VENIPUNCTURE: CPT

## 2019-11-20 PROCEDURE — 85025 COMPLETE CBC W/AUTO DIFF WBC: CPT

## 2019-11-20 PROCEDURE — 99024 POSTOP FOLLOW-UP VISIT: CPT | Mod: POP,,, | Performed by: PHYSICIAN ASSISTANT

## 2019-11-20 PROCEDURE — 63600175 PHARM REV CODE 636 W HCPCS: Performed by: PHYSICIAN ASSISTANT

## 2019-11-20 PROCEDURE — 27000221 HC OXYGEN, UP TO 24 HOURS

## 2019-11-20 PROCEDURE — 97165 OT EVAL LOW COMPLEX 30 MIN: CPT

## 2019-11-20 PROCEDURE — 51798 US URINE CAPACITY MEASURE: CPT

## 2019-11-20 PROCEDURE — 94761 N-INVAS EAR/PLS OXIMETRY MLT: CPT

## 2019-11-20 PROCEDURE — 80048 BASIC METABOLIC PNL TOTAL CA: CPT

## 2019-11-20 PROCEDURE — 51701 INSERT BLADDER CATHETER: CPT

## 2019-11-20 PROCEDURE — 25000003 PHARM REV CODE 250: Performed by: PHYSICIAN ASSISTANT

## 2019-11-20 PROCEDURE — 99024 PR POST-OP FOLLOW-UP VISIT: ICD-10-PCS | Mod: POP,,, | Performed by: PHYSICIAN ASSISTANT

## 2019-11-20 PROCEDURE — 97161 PT EVAL LOW COMPLEX 20 MIN: CPT

## 2019-11-20 PROCEDURE — 97530 THERAPEUTIC ACTIVITIES: CPT

## 2019-11-20 PROCEDURE — 11000001 HC ACUTE MED/SURG PRIVATE ROOM

## 2019-11-20 RX ORDER — SODIUM CHLORIDE AND POTASSIUM CHLORIDE 150; 900 MG/100ML; MG/100ML
INJECTION, SOLUTION INTRAVENOUS CONTINUOUS
Status: DISCONTINUED | OUTPATIENT
Start: 2019-11-20 | End: 2019-11-21 | Stop reason: HOSPADM

## 2019-11-20 RX ADMIN — TRAMADOL HYDROCHLORIDE 100 MG: 50 TABLET, FILM COATED ORAL at 05:11

## 2019-11-20 RX ADMIN — METHOCARBAMOL TABLETS 750 MG: 750 TABLET, COATED ORAL at 10:11

## 2019-11-20 RX ADMIN — HEPARIN SODIUM 5000 UNITS: 5000 INJECTION, SOLUTION INTRAVENOUS; SUBCUTANEOUS at 02:11

## 2019-11-20 RX ADMIN — ALUMINUM HYDROXIDE, MAGNESIUM HYDROXIDE, AND SIMETHICONE 30 ML: 200; 200; 20 SUSPENSION ORAL at 09:11

## 2019-11-20 RX ADMIN — ONDANSETRON 4 MG: 2 INJECTION INTRAMUSCULAR; INTRAVENOUS at 09:11

## 2019-11-20 RX ADMIN — HYDROCHLOROTHIAZIDE 12.5 MG: 12.5 TABLET ORAL at 09:11

## 2019-11-20 RX ADMIN — CELECOXIB 200 MG: 100 CAPSULE ORAL at 10:11

## 2019-11-20 RX ADMIN — SODIUM CHLORIDE 1000 ML: 0.9 INJECTION, SOLUTION INTRAVENOUS at 12:11

## 2019-11-20 RX ADMIN — TRAMADOL HYDROCHLORIDE 100 MG: 50 TABLET, FILM COATED ORAL at 12:11

## 2019-11-20 RX ADMIN — CELECOXIB 200 MG: 100 CAPSULE ORAL at 09:11

## 2019-11-20 RX ADMIN — GABAPENTIN 300 MG: 300 CAPSULE ORAL at 10:11

## 2019-11-20 RX ADMIN — GABAPENTIN 300 MG: 300 CAPSULE ORAL at 09:11

## 2019-11-20 RX ADMIN — ACETAMINOPHEN 650 MG: 325 TABLET ORAL at 06:11

## 2019-11-20 RX ADMIN — ATORVASTATIN CALCIUM 20 MG: 20 TABLET, FILM COATED ORAL at 10:11

## 2019-11-20 RX ADMIN — HEPARIN SODIUM 5000 UNITS: 5000 INJECTION, SOLUTION INTRAVENOUS; SUBCUTANEOUS at 06:11

## 2019-11-20 RX ADMIN — SODIUM CHLORIDE AND POTASSIUM CHLORIDE: .9; .15 SOLUTION INTRAVENOUS at 02:11

## 2019-11-20 RX ADMIN — MUPIROCIN 1 G: 20 OINTMENT TOPICAL at 10:11

## 2019-11-20 RX ADMIN — ACETAMINOPHEN 650 MG: 325 TABLET ORAL at 12:11

## 2019-11-20 RX ADMIN — PANTOPRAZOLE SODIUM 40 MG: 40 TABLET, DELAYED RELEASE ORAL at 10:11

## 2019-11-20 RX ADMIN — HYDROMORPHONE HYDROCHLORIDE 2 MG: 2 INJECTION, SOLUTION INTRAMUSCULAR; INTRAVENOUS; SUBCUTANEOUS at 12:11

## 2019-11-20 RX ADMIN — TRAMADOL HYDROCHLORIDE 100 MG: 50 TABLET, FILM COATED ORAL at 06:11

## 2019-11-20 RX ADMIN — MUPIROCIN 1 G: 20 OINTMENT TOPICAL at 09:11

## 2019-11-20 RX ADMIN — HYDROMORPHONE HYDROCHLORIDE 2 MG: 2 INJECTION, SOLUTION INTRAMUSCULAR; INTRAVENOUS; SUBCUTANEOUS at 04:11

## 2019-11-20 RX ADMIN — ACETAMINOPHEN 325 MG: 325 TABLET ORAL at 05:11

## 2019-11-20 RX ADMIN — ONDANSETRON 8 MG: 8 TABLET, ORALLY DISINTEGRATING ORAL at 12:11

## 2019-11-20 RX ADMIN — METHOCARBAMOL TABLETS 750 MG: 750 TABLET, COATED ORAL at 02:11

## 2019-11-20 RX ADMIN — ALUMINUM HYDROXIDE, MAGNESIUM HYDROXIDE, AND SIMETHICONE 30 ML: 200; 200; 20 SUSPENSION ORAL at 04:11

## 2019-11-20 RX ADMIN — HEPARIN SODIUM 5000 UNITS: 5000 INJECTION, SOLUTION INTRAVENOUS; SUBCUTANEOUS at 10:11

## 2019-11-20 RX ADMIN — METHOCARBAMOL TABLETS 750 MG: 750 TABLET, COATED ORAL at 09:11

## 2019-11-20 NOTE — PLAN OF CARE
Problem: Physical Therapy Goal  Goal: Physical Therapy Goal  Description  Goals to be met by: 2019     Patient will increase functional independence with mobility by performin. Supine to sit with Modified Lipscomb  2. Sit to stand transfer with Modified Lipscomb  3. Gait  x 150 feet with Modified Lipscomb using Rolling Walker.      Outcome: Ongoing, Progressing   Recommend Home with HH

## 2019-11-20 NOTE — PROGRESS NOTES
"[facility]  Neurosurgery  Progress Note    Subjective:     History of Present Illness: Sandrita Kaiser is 63 y.o.  female with history of former smoker (quit 03/10/2001), hyperlipidemia and hypertension who was complaining of significant back pain and radiating leg pain with feet paresthesias.  She had placement of the inter spinous spacer in an outside facility by pain management in February 2019 for lateral recess stenosis with radiculopathy. After the surgery she was able to stand more straight but 1 month later she started having more back pain.  She feels a sensation of instability in her back.  She feels that her back is "popping" more.  Pain is constant but worse with activity such as walking, biking, twisting. Back pain is moderate at this time but interfering with her QOL. She did 3-4 months of PT in the past.    Lumbar spine MRI 2019: L4-5 grade one spondylolisthesis with bilateral lateral recess stenosis  Lumbar XR today: worsening L4-5 spondylolisthesis    She was recommended a left L4-5 oblique interbody fusion placement interbody spacer using allograft, L4-5 posterior instrumentation     We have discussed the risks of surgery including bleeding, infection, failure of surgery, CSF leak, nerve root injury, spinal cord injury, ureter injury, weakness, paralysis, peripheral neuropathy, malplaced hardware, migration of hardware, non-union, need for reoperation. Patient understands the risks and would like to proceed with surgery.          Post-Op Info:  Procedure(s) (LRB):  FUSION, SPINE, LUMBAR Procedure: Stg 1: Left L4-5 oblique interbody fusion (Interbody spacer, allograft) / Stg 2:L4-5 Posterior instrumentation (Left)   1 Day Post-Op     Interval History: No acute events overnight.  Pain well controlled on current regimen.  She continues have incisional back pain and bilateral feet paresthesia radiating to her calf.  She requires 1 time straight cath at 5:00 a.m. secondary to urinary " retention.  Holding a.m. hypertensive med secondary to lower pressure.  Recommended patient to continue increase p.o. Intake.  Pending standing x-rays and physical therapy evaluation today.  Surgical drain kept secondary to bloody output.    Medications:  Continuous Infusions:   0/9% NACL & POTASSIUM CHLORIDE 20 MEQ/L 50 mL/hr at 11/19/19 1819     Scheduled Meds:   acetaminophen  1,000 mg Intravenous Once    acetaminophen  650 mg Oral 4 times per day    atorvastatin  20 mg Oral QHS    celecoxib  200 mg Oral BID    gabapentin  300 mg Oral BID    heparin (porcine)  5,000 Units Subcutaneous Q8H    hydroCHLOROthiazide  12.5 mg Oral Daily    losartan  100 mg Oral Daily    methocarbamol  750 mg Oral TID    mupirocin  1 g Nasal BID    pantoprazole  40 mg Oral Daily    traMADol  100 mg Oral 4 times per day     PRN Meds:acetaminophen, aluminum-magnesium hydroxide-simethicone, bisacodyl, diphenhydrAMINE, HYDROmorphone, melatonin, ondansetron, ondansetron, promethazine (PHENERGAN) IVPB, senna-docusate 8.6-50 mg, sodium chloride 0.9%     Review of Systems  Objective:     Weight: 106.1 kg (233 lb 14.5 oz)  Body mass index is 38.92 kg/m².  Vital Signs (Most Recent):  Temp: 98 °F (36.7 °C) (11/20/19 0700)  Pulse: 77 (11/20/19 0819)  Resp: 18 (11/20/19 0819)  BP: (!) 98/53 (11/20/19 0700)  SpO2: 98 % (11/20/19 0819) Vital Signs (24h Range):  Temp:  [97.3 °F (36.3 °C)-98.9 °F (37.2 °C)] 98 °F (36.7 °C)  Pulse:  [] 77  Resp:  [11-59] 18  SpO2:  [92 %-99 %] 98 %  BP: ()/(50-74) 98/53                          Closed/Suction Drain 11/19/19 1245 (Active)   Site Description Unable to view 11/20/2019  7:09 AM   Dressing Type Gauze;Transparent 11/20/2019  7:09 AM   Dressing Status Clean;Dry;Intact 11/20/2019  7:09 AM   Drainage Serosanguineous 11/20/2019  7:09 AM   Status To bulb suction 11/20/2019  7:09 AM   Output (mL) 35 mL 11/20/2019 12:00 AM       Neurosurgery Physical Exam  General: well developed, well  nourished. no acute distress.  Neurologic: Awake, alert and oriented x3. Thought content appropriate.  Head: normocephalic, atraumatic   GCS: Motor: 6/Verbal: 5/Eyes: 4 GCS Total: 15  Cranial nerves:face symmetric and sensation intact bilaterally, tongue midline, pupils equal, round, reactive to light with accomodation, extraocular muscles intact. CN II-XII grossly intact. Shoulder shrug strength equal. Palate rises symmetrically.  Uvula midline. Hearing equal with finger rub. Gag and taste not tested.     Language: no aphasia  Speech: no dysarthria     Sensory: response to light touch throughout  Motor Strength: Moves all extremities spontaneously with good tone. Full strength upper and lower extremities. No abnormal movements seen.      Strength  Deltoids Triceps Biceps Wrist Extension Wrist Flexion Hand    Upper: R 5/5 5/5 5/5 5/5 5/5 5/5    L 5/5 5/5 5/5 5/5 5/5 5/5     Iliopsoas Quadriceps Knee  Flexion Tibialis  anterior Gastro- cnemius EHL   Lower: R 5/5 5/5 5/5 5/5 5/5 5/5    L 5/5 5/5 5/5 5/5 5/5 5/5     Negative straight leg raise bilaterally.   no focal weakness.    ENT: normal hearing with finger rub  Heart: RRR, no cyanosis, pallor, or edema.   Lungs:  normal respiratory effort  Abdomen: soft, non-tender and symmetric  Extremities: warm with no cyanosis, edema, or clubbing  Pulses: palpable distal pulses  Skin: warm, dry and intact. No visible rashes or lesions.     Posterior incisional wound and left lateral incision:  wound is c/d/i, no signs of infection, no erythema, warmth, edema, ttp,  no drainage.  wound edges are well approximated with staples.    Surgical drain at left lateral incision intact          Significant Labs:  Recent Labs   Lab 11/20/19  0617   *      K 4.8      CO2 26   BUN 21   CREATININE 1.1   CALCIUM 9.2     Recent Labs   Lab 11/20/19  0617   WBC 11.11   HGB 10.5*   HCT 33.1*        No results for input(s): LABPT, INR, APTT in the last 48  hours.  Microbiology Results (last 7 days)     ** No results found for the last 168 hours. **        Significant Diagnostics:  Pending standing lumbar x-rays postop    Assessment/Plan:     * Spondylolisthesis of lumbar region  63 y.o.  female with history of former smoker (quit 03/10/2001), hyperlipidemia and hypertension  who is status post left oblique L4-5 interbody fusion on 11/19/2019    -Patient is neurologically stable.  -Pending post op imaging scheduled for today.  -Pain controlled on current regimen.  -Surgical drain: 135cc since surgery. Nursing staff to empty and document q6h.   -LSO when OOB or with activity  -PT/OT/OOB daily  -Patient must be OOB for atleast 6 hours daily (may be in intervals: 2 hours in chair with each meal)  -IS to bed side. Patient to use atleast 10x every hour.  -Continue bowel regimen daily.  -Continue SQH, Teds and SCDs for DVTP.  -PPI for GI prophylaxis  -Restarted all home meds for hypertension and hyperlipidemia.  -hypotension:  Likely secondary to decreased intake.  Hold a.m. meds and encourage increase p.o. intake.  Continue IV fluid today.  -Jimenez DC'ed 11/18/2019 postop.  Patient require 1 time straight cath overnight secondary to nerve retention.  Will bladder scan in 4 hour if patient does not void spontaneously.    Dispo: DC home pending drain removal, standing x-rays, and therapy evaluation. patient has 2 wk wound check with NSR and 6 week follow up with Dr. Miller    Discussed with Dr. Paul Meyers, PA-C  Neurosurgery  [facility]

## 2019-11-20 NOTE — PLAN OF CARE
Pt AAOx4. Pt with complaints of lower back pain with moderate relief from PRN dilaudid. Little PO intake, 1x complaint of nausea after ambulation with full relief from PRN zofran. ANTHONY drain intact, dressing reinforced. IVF infusing at 50 ml/hr. Pt on 2L NC. Bed locked in lowest position, bed alarm set and call bell within reach. Will continue to monitor.

## 2019-11-20 NOTE — HOSPITAL COURSE
11/19/2019:  OR for  left L4-5 oblique interbody fusion placement interbody spacer using allograft, L4-5 posterior instrumentation  11/20:  No acute events overnight.  Hypotension overnight (systolic blood pressure 90s-100s)  Pain well controlled on current regimen.  She continues have incisional back pain and bilateral feet paresthesia radiating to her calf.  She requires 1 time straight cath at 5:00 a.m. secondary to urinary retention.  Holding a.m. hypertensive med secondary to lower pressure.  Recommended patient to continue increase p.o. Intake.  Pending standing x-rays and physical therapy evaluation today.  Surgical drain kept secondary to bloody output.  11/21:  No acute events overnight.  Vitals stable (systolic blood pressure 120).  Decreased appetite; no bowel movement but flatus present. Pain well controlled on current regimen.  Preoperative back pain improved significantly.  Requiring 3 straight calf since surgery secondary to urinary retention.  Patient is voiding but has continued high postvoid residual (500-900 CC).  Discussed with Dr. Clark, who recommends replacing Jimenez and have her follow-up with her and one-week for voiding trial.  Surgical drain removed secondary to low output and wound was reinforced with staples.  Postop x-rays appropriate hardware placement.  Plan to DC home today with rolling walker as patient already has shower chair at home.  Patient is 2 weeks wound check with neurosurgery and 6 with follow-up Dr. Miller in clinic.

## 2019-11-20 NOTE — PLAN OF CARE
Pt arrived to unit from sURGERY. Admission questions completed by VN. Introduced self as VN for this shift. Educated pt on VTE risk, safety precautions, and VN's role in pt care. Opportunity given for pt's questions. All questions answered.

## 2019-11-20 NOTE — PT/OT/SLP EVAL
Occupational Therapy   Evaluation    Name: Sandrita Kaiser  MRN: [id number]  Admitting Diagnosis:  Spondylolisthesis of lumbar region 1 Day Post-Op    Recommendations:     Discharge Recommendations: (TBD)  Discharge Equipment Recommendations:  (TBD)  Barriers to discharge:  Inaccessible home environment    Assessment:     Sandrita Kaiser is a 64 y.o. female with a medical diagnosis of Spondylolisthesis of lumbar region.  She presents with s/p lumbar surgery . Performance deficits affecting function: weakness, impaired self care skills, impaired balance, impaired functional mobilty, impaired endurance, gait instability, orthopedic precautions, impaired skin.      Pt with limited performance during evaluation this AM 2/2 nausea and dizziness. D/c recs TBD at this time2    Rehab Prognosis: Good; patient would benefit from acute skilled OT services to address these deficits and reach maximum level of function.       Plan:     Patient to be seen 5 x/week to address the above listed problems via self-care/home management, therapeutic activities, therapeutic exercises  · Plan of Care Expires: 12/20/19  · Plan of Care Reviewed with: patient    Subjective     Chief Complaint: Pt with reports of nausea and dizziness throughout session   Patient/Family Comments/goals: return to PLOF     Occupational Profile:  Living Environment: with spouse (who has dementia, but does not currently need physical assist) in RV with 4.5 steps to enter, L rail, WIS with ledge to enter  Previous level of function: pt reports use of SPC PRN in RV and power scooter in community PRN with increased pain   Equipment Used at Home:  cane, straight, power chair  Assistance upon Discharge: limited     Pain/Comfort:  · Pain Rating 1: (did not rate )  · Location - Orientation 1: lower  · Location 1: back  · Pain Addressed 1: Pre-medicate for activity, Nurse notified, Reposition, Distraction, Cessation of Activity  · Pain Rating Post-Intervention 1: (did  not rate )    Patients cultural, spiritual, Sabianist conflicts given the current situation:      Objective:     Communicated with: nsg prior to session.   General Precautions: Standard, fall   Orthopedic Precautions:spinal precautions   Braces: LSO     Occupational Performance:    Bed Mobility:    · Patient completed Rolling/Turning to Right with contact guard assistance  · Patient completed Scooting/Bridging with contact guard assistance  · Patient completed Supine to Sit with minimum assistance    Functional Mobility/Transfers:  · Patient completed Sit <> Stand Transfer with contact guard assistance  with  rolling walker   · Functional Mobility: CGA with RW     Activities of Daily Living:  · Upper Body Dressing: minimum assistance      Cognitive/Visual Perceptual:  Cognitive/Psychosocial Skills:     -       Oriented to: Person, Place, Time and Situation   -       Follows Commands/attention:Follows multistep  commands  -       Communication: clear/fluent  -       Memory: No Deficits noted  -       Safety awareness/insight to disability: intact   -       Mood/Affect/Coping skills/emotional control: Appropriate to situation    Physical Exam:  Balance:    -       SBA- supervision sitting balance; CGA/Min A standing   Postural examination/scapula alignment:    -       Rounded shoulders  Skin integrity: Wound L lateral surgical   Sensation:    -       Intact  Upper Extremity Range of Motion:   BUE ROM WFL for pt's needs based on observed function during session  Upper Extremity Strength:  BUE WFL for pt's needs     AMPAC 6 Click ADL:  AMPAC Total Score: 17    Treatment & Education:  Increased time spent in pt's room 2/2 nausea and dizziness during session   10 min spent 1st encounter with pt gathering PLOF info and educated on back precautions with movement   2nd encounter, pt remains with nausea and dizziness, however, requesting to participate in OOB axs; required re-education on back precautions prior to axs (pt  reports she was twisting and reaching over the edge of bed in order to grab her eye mask off of the ground)  BP WNL while supine   Educated on log rolling for bed mobility with therapist demonstration followed by pt performance with Min A   Sat EOB and donned LSO brace with Min A as well as hospital gown as robe; pt again with dizziness, however, BP remained in same parameters as while supine   Stood x 2 trials from EOB CGA , pt becoming dizzy and nauseous; t/f to b/s chair with CGA and reclined- BP 99/67 following axs; reports feeling better after sitting   Educated on tolerating upright sitting and ROM/therex to perform while seated within spinal precautions  Pt unable to recall back precautions independently following session     Education:    Patient left up in chair with all lines intact, call button in reach, chair alarm on and ns notified    GOALS:   Multidisciplinary Problems     Occupational Therapy Goals        Problem: Occupational Therapy Goal    Goal Priority Disciplines Outcome Interventions   Occupational Therapy Goal     OT, PT/OT Ongoing, Progressing    Description:  Goals to be met by: 12/20/19     Patient will increase functional independence with ADLs by performing:    LE Dressing with Supervision.  Grooming while standing with Supervision.  Toileting from toilet with Supervision for hygiene and clothing management.   Supine to sit with Supervision.  Step transfer with Supervision  Toilet transfer to toilet with Supervision.  Upper extremity exercise program x10 reps per handout, with independence.                      History:     Past Medical History:   Diagnosis Date    High cholesterol     Hypertension        Past Surgical History:   Procedure Laterality Date    BREAST BIOPSY      LUMBAR FUSION Left 11/19/2019    Procedure: FUSION, SPINE, LUMBAR Procedure: Stg 1: Left L4-5 oblique interbody fusion (Interbody spacer, allograft) / Stg 2:L4-5 Posterior instrumentation;  Surgeon: Ramos LUU  MD Paul;  Location: [facility];  Service: Neurosurgery;  Laterality: Left;  FUSION, SPINE, LUMBAR  Procedure: Stg 1: Left L4-5 oblique interbody fusion (Interbody spacer, allograft) / Stg 2:L4-5 Posterior instrumentation  Surg    TONSILLECTOMY      TUBAL LIGATION         Time Tracking:     OT Date of Treatment: 11/20/19  OT Start Time: 1003(0914)  OT Stop Time: 1035(924)  OT Total Time (min): 32 min    Billable Minutes:Evaluation 10  Therapeutic Activity 32    Anne Pemberton OT  11/20/2019

## 2019-11-20 NOTE — NURSING
Bladder scan volume 270 ml. Pt ambulated to toilet with LSO brace on. No urine voided. Will continue to monitor.

## 2019-11-20 NOTE — ANESTHESIA POSTPROCEDURE EVALUATION
Anesthesia Post Evaluation    Patient: Sandrita Kaiser    Procedure(s) Performed: Procedure(s) (LRB):  FUSION, SPINE, LUMBAR Procedure: Stg 1: Left L4-5 oblique interbody fusion (Interbody spacer, allograft) / Stg 2:L4-5 Posterior instrumentation (Left)    Final Anesthesia Type: general    Patient location during evaluation: PACU  Patient participation: Yes- Able to Participate  Level of consciousness: awake and alert  Post-procedure vital signs: reviewed and stable  Pain management: adequate  Airway patency: patent    PONV status at discharge: No PONV  Anesthetic complications: no      Cardiovascular status: hemodynamically stable  Respiratory status: spontaneous ventilation and unassisted  Hydration status: euvolemic  Follow-up not needed.          Vitals Value Taken Time   /62 11/19/2019  4:52 PM   Temp 36.3 °C (97.3 °F) 11/19/2019  4:39 PM   Pulse 82 11/19/2019  4:52 PM   Resp 7 11/19/2019  4:52 PM   SpO2 95 % 11/19/2019  4:52 PM   Vitals shown include unvalidated device data.      Event Time     Out of Recovery 16:53:27          Pain/Margo Score: Pain Rating Prior to Med Admin: 5 (11/19/2019  6:24 PM)

## 2019-11-20 NOTE — PLAN OF CARE
.Patient on oxygen with documented flow.  Will attempt to wean per O2 order protocol. Will continue to monitor.

## 2019-11-20 NOTE — PLAN OF CARE
VN rounds: VN cued into pt's room with pt's permission. Pt in restroom sitting on commode. Asked to return later to round. Pt stated it was ok and wanted to talk . Fall risk protocol discussed with pt. VN instructed pt to call for assistance. Pt aware and agreeable. NAD noted. Allowed time for questions. Pt c/o urinary retention and back soreness.  Will cont to be available and intervene as needed.     11/20/19 1140   Type of Frequent Check   Type Patient Rounds;Other (see comments)  (vn round)   Safety/Activity   Patient Rounds visualized patient;call light in patient/parent reach   Safety Promotion/Fall Prevention instructed to call staff for mobility;Fall Risk reviewed with patient/family   Positioning   Body Position other (see comments)  (in bathroom on commode. )   Pain/Comfort/Sleep   Preferred Pain Scale number (Numeric Rating Pain Scale)   Pain Body Location back   Pain Rating (0-10): Rest 3   Sleep/Rest/Relaxation awake   Assessments (Pre/Post)   Level of Consciousness (AVPU) alert

## 2019-11-20 NOTE — PT/OT/SLP EVAL
Physical Therapy Evaluation    Patient Name:  Sandrita Kaiser   MRN:  [id number]    Recommendations:     Discharge Recommendations:  home with home health   Discharge Equipment Recommendations: walker, rolling, shower chair   Barriers to discharge: None    Assessment:     Sandrita Kaiser is a 64 y.o. female admitted with a medical diagnosis of Spondylolisthesis of lumbar region.  She presents with the following impairments/functional limitations:  weakness, gait instability, decreased lower extremity function, decreased ROM, impaired functional mobilty, impaired self care skills, pain, impaired skin, edema, impaired balance, impaired cardiopulmonary response to activity . Patient able to use RW with 5 steps to chair at bedside limited by nausea/vomiting and low BP.    Rehab Prognosis: Good; patient would benefit from acute skilled PT services to address these deficits and reach maximum level of function.    Recent Surgery: Procedure(s) (LRB):  FUSION, SPINE, LUMBAR Procedure: Stg 1: Left L4-5 oblique interbody fusion (Interbody spacer, allograft) / Stg 2:L4-5 Posterior instrumentation (Left) 1 Day Post-Op    Plan:     During this hospitalization, patient to be seen daily to address the identified rehab impairments via gait training, therapeutic activities, therapeutic exercises and progress toward the following goals:    · Plan of Care Expires:  12/20/19    Subjective     Chief Complaint: nausea and lightheadedness  Patient/Family Comments/goals: go home  Pain/Comfort:  · Pain Rating 1: other (see comments)(did not rate on scale)  · Location - Orientation 1: lower  · Location 1: back  · Pain Addressed 1: Reposition, Distraction, Pre-medicate for activity    Patients cultural, spiritual, Worship conflicts given the current situation:      Living Environment:  Lives with spouse in / motor home with 4 1/2 steps to enter with WIS  Prior to admission, patients level of function was independent.  Equipment used  at home: cane, straight.  DME owned (not currently used): none.  Upon discharge, patient will have assistance from family.    Objective:     Communicated with primary nurse prior to session.  Patient found up sitting EOB with O T with peripheral IV, hemovac  upon PT entry to room.    General Precautions: Standard, fall   Orthopedic Precautions:spinal precautions   Braces: LSO     Exams:  · RLE ROM: WFL  · RLE Strength: WFL  · LLE ROM: WFL  · LLE Strength: WFL    Functional Mobility:  · Transfers:     · Sit to Stand:  stand by assistance and contact guard assistance with rolling walker  · Gait: 5 steps to Bedside chair with RW and CG assist  · Balance: fair with RW      Therapeutic Activities and Exercises:   Reviewed proper posture and body mechanics     AM-PAC 6 CLICK MOBILITY  Total Score:19     Patient left up in chair with all lines intact, call button in reach, family present and LOS donned.    GOALS:   Multidisciplinary Problems     Physical Therapy Goals        Problem: Physical Therapy Goal    Goal Priority Disciplines Outcome Goal Variances Interventions   Physical Therapy Goal     PT, PT/OT Ongoing, Progressing     Description:  Goals to be met by: 2019     Patient will increase functional independence with mobility by performin. Supine to sit with Modified [place]  2. Sit to stand transfer with Modified [place]  3. Gait  x 150 feet with Modified [place] using Rolling Walker.                       History:     Past Medical History:   Diagnosis Date    High cholesterol     Hypertension        Past Surgical History:   Procedure Laterality Date    BREAST BIOPSY      LUMBAR FUSION Left 2019    Procedure: FUSION, SPINE, LUMBAR Procedure: Stg 1: Left L4-5 oblique interbody fusion (Interbody spacer, allograft) / Stg 2:L4-5 Posterior instrumentation;  Surgeon: Ramos Miller MD;  Location: [facility];  Service: Neurosurgery;  Laterality: Left;  FUSION, SPINE, LUMBAR  Procedure:  Stg 1: Left L4-5 oblique interbody fusion (Interbody spacer, allograft) / Stg 2:L4-5 Posterior instrumentation  Surg    TONSILLECTOMY      TUBAL LIGATION         Time Tracking:     PT Received On: 11/20/19  PT Start Time: 1020     PT Stop Time: 1035  PT Total Time (min): 15 min     Billable Minutes: Evaluation 15      Ramos Disla, PT  11/20/2019

## 2019-11-20 NOTE — NURSING
Dr. Miller visited patient and notified of patient throwing up this am and bladder scan of 174ml in bladder.  Order to continue monitoring patient.  Dressing to left flank reinforced.  ANTHONY Drain is in place and patent and draining serosanguinous drainage.  Charged.  Will continue to monitor.

## 2019-11-20 NOTE — SUBJECTIVE & OBJECTIVE
Interval History: No acute events overnight.  Pain well controlled on current regimen.  She continues have incisional back pain and bilateral feet paresthesia radiating to her calf.  She requires 1 time straight cath at 5:00 a.m. secondary to urinary retention.  Holding a.m. hypertensive med secondary to lower pressure.  Recommended patient to continue increase p.o. Intake.  Pending standing x-rays and physical therapy evaluation today.  Surgical drain kept secondary to bloody output.    Medications:  Continuous Infusions:   0/9% NACL & POTASSIUM CHLORIDE 20 MEQ/L 50 mL/hr at 11/19/19 1819     Scheduled Meds:   acetaminophen  1,000 mg Intravenous Once    acetaminophen  650 mg Oral 4 times per day    atorvastatin  20 mg Oral QHS    celecoxib  200 mg Oral BID    gabapentin  300 mg Oral BID    heparin (porcine)  5,000 Units Subcutaneous Q8H    hydroCHLOROthiazide  12.5 mg Oral Daily    losartan  100 mg Oral Daily    methocarbamol  750 mg Oral TID    mupirocin  1 g Nasal BID    pantoprazole  40 mg Oral Daily    traMADol  100 mg Oral 4 times per day     PRN Meds:acetaminophen, aluminum-magnesium hydroxide-simethicone, bisacodyl, diphenhydrAMINE, HYDROmorphone, melatonin, ondansetron, ondansetron, promethazine (PHENERGAN) IVPB, senna-docusate 8.6-50 mg, sodium chloride 0.9%     Review of Systems  Objective:     Weight: 106.1 kg (233 lb 14.5 oz)  Body mass index is 38.92 kg/m².  Vital Signs (Most Recent):  Temp: 98 °F (36.7 °C) (11/20/19 0700)  Pulse: 77 (11/20/19 0819)  Resp: 18 (11/20/19 0819)  BP: (!) 98/53 (11/20/19 0700)  SpO2: 98 % (11/20/19 0819) Vital Signs (24h Range):  Temp:  [97.3 °F (36.3 °C)-98.9 °F (37.2 °C)] 98 °F (36.7 °C)  Pulse:  [] 77  Resp:  [11-59] 18  SpO2:  [92 %-99 %] 98 %  BP: ()/(50-74) 98/53                          Closed/Suction Drain 11/19/19 1245 (Active)   Site Description Unable to view 11/20/2019  7:09 AM   Dressing Type Gauze;Transparent 11/20/2019  7:09 AM    Dressing Status Clean;Dry;Intact 11/20/2019  7:09 AM   Drainage Serosanguineous 11/20/2019  7:09 AM   Status To bulb suction 11/20/2019  7:09 AM   Output (mL) 35 mL 11/20/2019 12:00 AM       Neurosurgery Physical Exam  General: well developed, well nourished. no acute distress.  Neurologic: Awake, alert and oriented x3. Thought content appropriate.  Head: normocephalic, atraumatic   GCS: Motor: 6/Verbal: 5/Eyes: 4 GCS Total: 15  Cranial nerves:face symmetric and sensation intact bilaterally, tongue midline, pupils equal, round, reactive to light with accomodation, extraocular muscles intact. CN II-XII grossly intact. Shoulder shrug strength equal. Palate rises symmetrically.  Uvula midline. Hearing equal with finger rub. Gag and taste not tested.     Language: no aphasia  Speech: no dysarthria     Sensory: response to light touch throughout  Motor Strength: Moves all extremities spontaneously with good tone. Full strength upper and lower extremities. No abnormal movements seen.      Strength  Deltoids Triceps Biceps Wrist Extension Wrist Flexion Hand    Upper: R 5/5 5/5 5/5 5/5 5/5 5/5    L 5/5 5/5 5/5 5/5 5/5 5/5     Iliopsoas Quadriceps Knee  Flexion Tibialis  anterior Gastro- cnemius EHL   Lower: R 5/5 5/5 5/5 5/5 5/5 5/5    L 5/5 5/5 5/5 5/5 5/5 5/5     Negative straight leg raise bilaterally.   no focal weakness.    ENT: normal hearing with finger rub  Heart: RRR, no cyanosis, pallor, or edema.   Lungs:  normal respiratory effort  Abdomen: soft, non-tender and symmetric  Extremities: warm with no cyanosis, edema, or clubbing  Pulses: palpable distal pulses  Skin: warm, dry and intact. No visible rashes or lesions.     Posterior incisional wound and left lateral incision:  wound is c/d/i, no signs of infection, no erythema, warmth, edema, ttp,  no drainage.  wound edges are well approximated with staples.    Surgical drain at left lateral incision intact          Significant Labs:  Recent Labs   Lab  11/20/19  0617   *      K 4.8      CO2 26   BUN 21   CREATININE 1.1   CALCIUM 9.2     Recent Labs   Lab 11/20/19  0617   WBC 11.11   HGB 10.5*   HCT 33.1*        No results for input(s): LABPT, INR, APTT in the last 48 hours.  Microbiology Results (last 7 days)     ** No results found for the last 168 hours. **        Significant Diagnostics:  Pending standing lumbar x-rays postop

## 2019-11-20 NOTE — PLAN OF CARE
Problem: Occupational Therapy Goal  Goal: Occupational Therapy Goal  Description  Goals to be met by: 12/20/19     Patient will increase functional independence with ADLs by performing:    LE Dressing with Supervision.  Grooming while standing with Supervision.  Toileting from toilet with Supervision for hygiene and clothing management.   Supine to sit with Supervision.  Step transfer with Supervision  Toilet transfer to toilet with Supervision.  Upper extremity exercise program x10 reps per handout, with independence.     Outcome: Ongoing, Progressing       Pt with limited performance during evaluation this AM 2/2 nausea and dizziness. D/c recs TBD at this time

## 2019-11-20 NOTE — PLAN OF CARE
TN met with patient and family at bedside.  Currently, patient lives at home with  Favian, [phone]. Patient states that she sometimes uses a cane with ambulation. No other DME or HH noted. Upon discharge patient will have help at home from her  who also  Provided transportation home and to follow up appointments.    TN updated whiteboard with contact information. Diischarge brochure given to patient. TN instructed patient and family to contact TN if they have any further questions or concerns. TN will continue to follow.         11/20/19 1326   Discharge Assessment   Assessment Type Discharge Planning Assessment   Confirmed/corrected address and phone number on facesheet? Yes   Assessment information obtained from? Patient   Prior to hospitilization cognitive status: Alert/Oriented   Prior to hospitalization functional status: Assistive Equipment   Current cognitive status: Alert/Oriented   Current Functional Status: Assistive Equipment   Lives With spouse   Able to Return to Prior Arrangements yes   Is patient able to care for self after discharge? Yes   Readmission Within the Last 30 Days no previous admission in last 30 days   Patient currently being followed by outpatient case management? No   Patient currently receives any other outside agency services? No   Equipment Currently Used at Home cane, straight   Do you have any problems affording any of your prescribed medications? No   Is the patient taking medications as prescribed? yes   Does the patient have transportation home? Yes   Transportation Anticipated family or friend will provide   Does the patient receive services at the [facility]? No   Discharge Plan A Home;Home Health   Discharge Plan B Home with family;Home Health   DME Needed Upon Discharge  other (see comments)  (TBD)   Patient/Family in Agreement with Plan yes   Does the patient have transportation to healthcare appointments? Yes        11/20/19 1326   Discharge  Assessment   Assessment Type Discharge Planning Assessment   Confirmed/corrected address and phone number on facesheet? Yes   Assessment information obtained from? Patient   Prior to hospitilization cognitive status: Alert/Oriented   Prior to hospitalization functional status: Assistive Equipment   Current cognitive status: Alert/Oriented   Current Functional Status: Assistive Equipment   Lives With spouse   Able to Return to Prior Arrangements yes   Is patient able to care for self after discharge? Yes   Readmission Within the Last 30 Days no previous admission in last 30 days   Patient currently being followed by outpatient case management? No   Patient currently receives any other outside agency services? No   Equipment Currently Used at Home cane, straight   Do you have any problems affording any of your prescribed medications? No   Is the patient taking medications as prescribed? yes   Does the patient have transportation home? Yes   Transportation Anticipated family or friend will provide   Does the patient receive services at the [facility]? No   Discharge Plan A Home;Home Health   Discharge Plan B Home with family;Home Health   DME Needed Upon Discharge  other (see comments)  (TBD)   Patient/Family in Agreement with Plan yes   Does the patient have transportation to healthcare appointments? Yes

## 2019-11-20 NOTE — NURSING
Bladder scan volume 398 ml. Straight cath performed with 450 ml/hr of clear yellow urine. Will continue to monitor.

## 2019-11-20 NOTE — HPI
"Sandrita Kaiser is 63 y.o.  female with history of former smoker (quit 03/10/2001), hyperlipidemia and hypertension who was complaining of significant back pain and radiating leg pain with feet paresthesias.  She had placement of the inter spinous spacer in an outside facility by pain management in February 2019 for lateral recess stenosis with radiculopathy. After the surgery she was able to stand more straight but 1 month later she started having more back pain.  She feels a sensation of instability in her back.  She feels that her back is "popping" more.  Pain is constant but worse with activity such as walking, biking, twisting. Back pain is moderate at this time but interfering with her QOL. She did 3-4 months of PT in the past.    Lumbar spine MRI 2019: L4-5 grade one spondylolisthesis with bilateral lateral recess stenosis  Lumbar XR today: worsening L4-5 spondylolisthesis    She was recommended a left L4-5 oblique interbody fusion placement interbody spacer using allograft, L4-5 posterior instrumentation     We have discussed the risks of surgery including bleeding, infection, failure of surgery, CSF leak, nerve root injury, spinal cord injury, ureter injury, weakness, paralysis, peripheral neuropathy, malplaced hardware, migration of hardware, non-union, need for reoperation. Patient understands the risks and would like to proceed with surgery.        "

## 2019-11-21 VITALS
SYSTOLIC BLOOD PRESSURE: 114 MMHG | WEIGHT: 235.88 LBS | HEIGHT: 65 IN | OXYGEN SATURATION: 94 % | BODY MASS INDEX: 39.3 KG/M2 | DIASTOLIC BLOOD PRESSURE: 53 MMHG | HEART RATE: 86 BPM | RESPIRATION RATE: 18 BRPM | TEMPERATURE: 98 F

## 2019-11-21 PROCEDURE — 25000003 PHARM REV CODE 250: Performed by: PHYSICIAN ASSISTANT

## 2019-11-21 PROCEDURE — 99024 POSTOP FOLLOW-UP VISIT: CPT | Mod: POP,,, | Performed by: PHYSICIAN ASSISTANT

## 2019-11-21 PROCEDURE — 94761 N-INVAS EAR/PLS OXIMETRY MLT: CPT

## 2019-11-21 PROCEDURE — 99024 PR POST-OP FOLLOW-UP VISIT: ICD-10-PCS | Mod: POP,,, | Performed by: PHYSICIAN ASSISTANT

## 2019-11-21 PROCEDURE — 97116 GAIT TRAINING THERAPY: CPT

## 2019-11-21 PROCEDURE — 99900035 HC TECH TIME PER 15 MIN (STAT)

## 2019-11-21 PROCEDURE — 97535 SELF CARE MNGMENT TRAINING: CPT

## 2019-11-21 PROCEDURE — 63600175 PHARM REV CODE 636 W HCPCS: Performed by: PHYSICIAN ASSISTANT

## 2019-11-21 RX ORDER — METHOCARBAMOL 750 MG/1
750 TABLET, FILM COATED ORAL 3 TIMES DAILY PRN
Qty: 60 TABLET | Refills: 0 | Status: SHIPPED | OUTPATIENT
Start: 2019-11-21 | End: 2019-12-06 | Stop reason: SDUPTHER

## 2019-11-21 RX ORDER — HYDROCODONE BITARTRATE AND ACETAMINOPHEN 5; 325 MG/1; MG/1
1 TABLET ORAL EVERY 6 HOURS PRN
Qty: 60 TABLET | Refills: 0 | Status: SHIPPED | OUTPATIENT
Start: 2019-11-21 | End: 2019-11-27 | Stop reason: SDUPTHER

## 2019-11-21 RX ADMIN — SODIUM CHLORIDE AND POTASSIUM CHLORIDE: .9; .15 SOLUTION INTRAVENOUS at 03:11

## 2019-11-21 RX ADMIN — TRAMADOL HYDROCHLORIDE 100 MG: 50 TABLET, FILM COATED ORAL at 12:11

## 2019-11-21 RX ADMIN — HYDROCHLOROTHIAZIDE 12.5 MG: 12.5 TABLET ORAL at 10:11

## 2019-11-21 RX ADMIN — ACETAMINOPHEN 650 MG: 325 TABLET ORAL at 12:11

## 2019-11-21 RX ADMIN — ACETAMINOPHEN 650 MG: 325 TABLET ORAL at 05:11

## 2019-11-21 RX ADMIN — GABAPENTIN 300 MG: 300 CAPSULE ORAL at 10:11

## 2019-11-21 RX ADMIN — TRAMADOL HYDROCHLORIDE 100 MG: 50 TABLET, FILM COATED ORAL at 05:11

## 2019-11-21 RX ADMIN — LOSARTAN POTASSIUM 100 MG: 50 TABLET ORAL at 10:11

## 2019-11-21 RX ADMIN — METHOCARBAMOL TABLETS 750 MG: 750 TABLET, COATED ORAL at 03:11

## 2019-11-21 RX ADMIN — HEPARIN SODIUM 5000 UNITS: 5000 INJECTION, SOLUTION INTRAVENOUS; SUBCUTANEOUS at 05:11

## 2019-11-21 RX ADMIN — MUPIROCIN 1 G: 20 OINTMENT TOPICAL at 10:11

## 2019-11-21 RX ADMIN — CELECOXIB 200 MG: 100 CAPSULE ORAL at 10:11

## 2019-11-21 RX ADMIN — PANTOPRAZOLE SODIUM 40 MG: 40 TABLET, DELAYED RELEASE ORAL at 10:11

## 2019-11-21 RX ADMIN — METHOCARBAMOL TABLETS 750 MG: 750 TABLET, COATED ORAL at 10:11

## 2019-11-21 NOTE — PROGRESS NOTES
"[facility]  Neurosurgery  Progress Note    Subjective:     History of Present Illness: Sandrita Kaiser is 63 y.o.  female with history of former smoker (quit 03/10/2001), hyperlipidemia and hypertension who was complaining of significant back pain and radiating leg pain with feet paresthesias.  She had placement of the inter spinous spacer in an outside facility by pain management in February 2019 for lateral recess stenosis with radiculopathy. After the surgery she was able to stand more straight but 1 month later she started having more back pain.  She feels a sensation of instability in her back.  She feels that her back is "popping" more.  Pain is constant but worse with activity such as walking, biking, twisting. Back pain is moderate at this time but interfering with her QOL. She did 3-4 months of PT in the past.    Lumbar spine MRI 2019: L4-5 grade one spondylolisthesis with bilateral lateral recess stenosis  Lumbar XR today: worsening L4-5 spondylolisthesis    She was recommended a left L4-5 oblique interbody fusion placement interbody spacer using allograft, L4-5 posterior instrumentation     We have discussed the risks of surgery including bleeding, infection, failure of surgery, CSF leak, nerve root injury, spinal cord injury, ureter injury, weakness, paralysis, peripheral neuropathy, malplaced hardware, migration of hardware, non-union, need for reoperation. Patient understands the risks and would like to proceed with surgery.          Post-Op Info:  Procedure(s) (LRB):  FUSION, SPINE, LUMBAR Procedure: Stg 1: Left L4-5 oblique interbody fusion (Interbody spacer, allograft) / Stg 2:L4-5 Posterior instrumentation (Left)   2 Days Post-Op     Interval History: No acute events overnight.  Vitals stable (systolic blood pressure 120).  Decreased appetite; no bowel movement but flatus present. Pain well controlled on current regimen.  Preoperative back pain improved significantly.  Requiring 3 " straight calf since surgery secondary to urinary retention.  Patient is voiding but has continued high postvoid residual (500-900 CC).  Discussed with Dr. Clark, who recommends replacing Jimenez and have her follow-up with her and one-week for voiding trial.  Surgical drain removed secondary to low output and wound was reinforced with staples.  Postop x-rays appropriate hardware placement.  Plan to DC home today with rolling walker as patient already has shower chair at home.  Patient is 2 weeks wound check with neurosurgery and 6 with follow-up Dr. Miller in clinic.    Medications:  Continuous Infusions:   0/9% NACL & POTASSIUM CHLORIDE 20 MEQ/L 100 mL/hr at 11/21/19 0342     Scheduled Meds:   acetaminophen  650 mg Oral 4 times per day    atorvastatin  20 mg Oral QHS    celecoxib  200 mg Oral BID    gabapentin  300 mg Oral BID    heparin (porcine)  5,000 Units Subcutaneous Q8H    hydroCHLOROthiazide  12.5 mg Oral Daily    losartan  100 mg Oral Daily    methocarbamol  750 mg Oral TID    mupirocin  1 g Nasal BID    pantoprazole  40 mg Oral Daily    traMADol  100 mg Oral 4 times per day     PRN Meds:acetaminophen, aluminum-magnesium hydroxide-simethicone, bisacodyl, diphenhydrAMINE, HYDROmorphone, melatonin, ondansetron, ondansetron, promethazine (PHENERGAN) IVPB, senna-docusate 8.6-50 mg, sodium chloride 0.9%     Review of Systems  Objective:     Weight: 107 kg (235 lb 14.3 oz)  Body mass index is 39.25 kg/m².  Vital Signs (Most Recent):  Temp: 98.5 °F (36.9 °C) (11/21/19 0802)  Pulse: 83 (11/21/19 0837)  Resp: 18 (11/21/19 0837)  BP: (!) 125/58 (11/21/19 0802)  SpO2: (!) 90 % (11/21/19 0837) Vital Signs (24h Range):  Temp:  [96.6 °F (35.9 °C)-98.5 °F (36.9 °C)] 98.5 °F (36.9 °C)  Pulse:  [69-87] 83  Resp:  [16-20] 18  SpO2:  [86 %-98 %] 90 %  BP: (113-125)/(53-58) 125/58                          Closed/Suction Drain 11/19/19 1245 (Active)   Site Description Unable to view 11/20/2019 10:13 PM   Dressing Type  Transparent;Gauze 11/20/2019 10:13 PM   Dressing Status Clean;Dry;Intact 11/20/2019 10:13 PM   Drainage Serosanguineous 11/20/2019 10:13 PM   Status To bulb suction 11/20/2019 10:13 PM   Output (mL) 10 mL 11/21/2019  1:45 AM       Neurosurgery Physical Exam  General: well developed, well nourished. no acute distress.  Comfortable.  Neurologic: Awake, alert and oriented x3. Thought content appropriate.  Head: normocephalic, atraumatic   GCS: Motor: 6/Verbal: 5/Eyes: 4 GCS Total: 15  Cranial nerves:face symmetric and sensation intact bilaterally, tongue midline, pupils equal, round, reactive to light with accomodation, extraocular muscles intact. CN II-XII grossly intact. Shoulder shrug strength equal. Palate rises symmetrically.  Uvula midline. Hearing equal with finger rub. Gag and taste not tested.      Language: no aphasia  Speech: no dysarthria      Sensory: response to light touch throughout  Motor Strength: Moves all extremities spontaneously with good tone. Full strength upper and lower extremities. No abnormal movements seen.       Strength   Deltoids Triceps Biceps Wrist Extension Wrist Flexion Hand    Upper: R 5/5 5/5 5/5 5/5 5/5 5/5     L 5/5 5/5 5/5 5/5 5/5 5/5       Iliopsoas Quadriceps Knee  Flexion Tibialis  anterior Gastro- cnemius EHL   Lower: R 5/5 5/5 5/5 5/5 5/5 5/5     L 5/5 5/5 5/5 5/5 5/5 5/5      Negative straight leg raise bilaterally.   no focal weakness.     ENT: normal hearing with finger rub  Heart: RRR, no cyanosis, pallor, or edema.   Lungs:  normal respiratory effort  Abdomen: soft, non-tender and symmetric  Extremities: warm with no cyanosis, edema, or clubbing  Pulses: palpable distal pulses  Skin: warm, dry and intact. No visible rashes or lesions.      Posterior incisional wound and left lateral incision:  wound is c/d/i, no signs of infection, no erythema, warmth, edema, ttp,  no drainage.  wound edges are well approximated with staples.     Surgical drain at left lateral  incision intact    Significant Labs:  Recent Labs   Lab 11/20/19  0617   *      K 4.8      CO2 26   BUN 21   CREATININE 1.1   CALCIUM 9.2     Recent Labs   Lab 11/20/19  0617   WBC 11.11   HGB 10.5*   HCT 33.1*        No results for input(s): LABPT, INR, APTT in the last 48 hours.  Microbiology Results (last 7 days)     ** No results found for the last 168 hours. **            Significant Diagnostics:  Postop lumbar x-ray with appropriate hardware placement at L4-5.    Assessment/Plan:     * Spondylolisthesis of lumbar region  63 y.o.  female with history of former smoker (quit 03/10/2001), hyperlipidemia and hypertension  who is status post left oblique L4-5 interbody fusion on 11/19/2019    -Patient is neurologically stable.  -postop imaging with appropriate hardware placement L4-5.  -Pain controlled on current regimen.  -Surgical drain removed 11/21/2019 secondary to lateral foot.  Drain incision was secured with staples.  -LSO when OOB or with activity  -PT/OT/OOB daily  -Patient must be OOB for atleast 6 hours daily (may be in intervals: 2 hours in chair with each meal)  -IS to bed side. Patient to use atleast 10x every hour.  -Continue bowel regimen daily.  -Continue SQH, Teds and SCDs for DVTP.  -PPI for GI prophylaxis  -Restarted all home meds for hypertension and hyperlipidemia.  -hypotension:  Resolved with IV fluid.  -Jimenez DC'ed 11/18/2019 postop.  Patient require 3 X straight cath secondary to urinary retention despite voiding.  Discussed with Dr. Clark who recommends placing Jimenez and have her follow-up with Dr. Calrk and one-week for voiding trial.  Nursing staff will educate patient on management/care of Jimenez prior to DC.    Dispo: DC home today with rolling walker. patient has 2 wk wound check with NSR and 6 week follow up with Dr. Miller    Discussed with Dr. Miller            home health therapy ordered and case managed to set up prior to DC.        Marshal Meyers,  CAMMY  Neurosurgery  [facility]

## 2019-11-21 NOTE — PLAN OF CARE
Problem: Physical Therapy Goal  Goal: Physical Therapy Goal  Description  Goals to be met by: 2019     Patient will increase functional independence with mobility by performin. Supine to sit with Modified [place]  2. Sit to stand transfer with Modified [place]  3. Gait  x 150 feet with Modified [place] using Rolling Walker.       2019 1216 by Ramos Disla, PT  Outcome: Ongoing, Progressing  2019 1216 by Ramos Disla, PT  Reactivated  Recommend Home with   Patient will need RW

## 2019-11-21 NOTE — PT/OT/SLP PROGRESS
Occupational Therapy   Treatment    Name: Sandrita Kaiser  MRN: [id number]  Admitting Diagnosis:  Spondylolisthesis of lumbar region  2 Days Post-Op    Recommendations:     Discharge Recommendations: home health PT, home health OT  Discharge Equipment Recommendations:  walker, rolling, shower chair  Barriers to discharge:  Inaccessible home environment    Assessment:     Sandrita Kaiser is a 64 y.o. female with a medical diagnosis of Spondylolisthesis of lumbar region.  She presents with s/p lumbar surgery . Performance deficits affecting function are weakness, impaired self care skills, impaired balance, impaired functional mobilty, impaired endurance, gait instability, decreased lower extremity function, orthopedic precautions, decreased ROM, impaired skin, decreased safety awareness.     Pt progressing well towards goals today's session; performing functional mobility and ADLs supervision at this time. Recommending RW for home use. Educated on home safety. Set for d/c today's date.     Rehab Prognosis:  Good; patient would benefit from acute skilled OT services to address these deficits and reach maximum level of function.       Plan:     Patient to be seen 5 x/week to address the above listed problems via self-care/home management, therapeutic activities, therapeutic exercises  · Plan of Care Expires: 12/20/19  · Plan of Care Reviewed with: patient    Subjective     Pain/Comfort:  · Pain Rating 1: 0/10    Objective:     Communicated with: nsjeffrey prior to session.   General Precautions: Standard, fall   Orthopedic Precautions:spinal precautions   Braces: LSO     Occupational Performance:     Bed Mobility:    · Patient completed Scooting/Bridging with supervision  · Patient completed Supine to Sit with supervision     Functional Mobility/Transfers:  · Patient completed Sit <> Stand Transfer with supervision  with  rolling walker   · Patient completed Bed <> Chair Transfer using Step Transfer technique with  supervision with rolling walker  · Functional Mobility: supervision with RW     Activities of Daily Living:  · Grooming: supervision at sink   · Lower Body Dressing: minimum assistance 2/2 assist with threading geller through RLE underwear hole       AMPAC 6 Click ADL: 22    Treatment & Education:  Pt found supine; able to recall back precautions independently, however, requires verbal cues for follow through with precautions with movement   Log rolling for bed mobility supervision  Supervision liss and adjust LSO brace while seated   Pt required assist to liss underwear 2/2 geller in place in which she will be d/c;ing home with; educated on appropriate catheter care and management of catheter with movement/ADLs   Functional mobility performed in room with RW supervision; cues for safety with RW and not twisting spine   Stood at sink for G&H Axs   Educated on home safety while standing   Ambulated to b/s chair     Patient left UIC with all lines intact, call button in reach, chair alarm on and nsg notifiedEducation:      GOALS:   Multidisciplinary Problems     Occupational Therapy Goals        Problem: Occupational Therapy Goal    Goal Priority Disciplines Outcome Interventions   Occupational Therapy Goal     OT, PT/OT Ongoing, Progressing    Description:  Goals to be met by: 12/20/19     Patient will increase functional independence with ADLs by performing:    LE Dressing with Supervision.  Grooming while standing with Supervision.  Toileting from toilet with Supervision for hygiene and clothing management.   Supine to sit with Supervision.  Step transfer with Supervision  Toilet transfer to toilet with Supervision.  Upper extremity exercise program x10 reps per handout, with independence.                       Time Tracking:     OT Date of Treatment: 11/21/19  OT Start Time: 1029  OT Stop Time: 1100  OT Total Time (min): 31 min    Billable Minutes:Self Care/Home Management 31    Anne Pemberton OT  11/21/2019

## 2019-11-21 NOTE — PLAN OF CARE
VN note: VN completed AVS and attachments and notified bedside nurse, Vangie. Waiting for family to arrive to admin discharge education. Will cont to be available and intervene prn.

## 2019-11-21 NOTE — PLAN OF CARE
Patient is awake and alert.  Up with LSO Brace.  Ambulates to bathroom with minimal assist.  Patient voided 300ml of urine and post void residual per Bladder Scan was 806ml.  In and out cath with 750ml at 1600.  Recieves scheduled pain medications.  Continues to receive IVF.   visited.  Safety is maintained with bed low, wheels locked and side rails up.  Call light within reach.  Will continue to monitor.

## 2019-11-21 NOTE — SUBJECTIVE & OBJECTIVE
Interval History: No acute events overnight.  Vitals stable (systolic blood pressure 120).  Decreased appetite; no bowel movement but flatus present. Pain well controlled on current regimen.  Preoperative back pain improved significantly.  Requiring 3 straight calf since surgery secondary to urinary retention.  Patient is voiding but has continued high postvoid residual (500-900 CC).  Discussed with Dr. Clark, who recommends replacing Jimenez and have her follow-up with her and one-week for voiding trial.  Surgical drain removed secondary to low output and wound was reinforced with staples.  Postop x-rays appropriate hardware placement.  Plan to DC home today with rolling walker as patient already has shower chair at home.  Patient is 2 weeks wound check with neurosurgery and 6 with follow-up Dr. Miller in clinic.    Medications:  Continuous Infusions:   0/9% NACL & POTASSIUM CHLORIDE 20 MEQ/L 100 mL/hr at 11/21/19 0342     Scheduled Meds:   acetaminophen  650 mg Oral 4 times per day    atorvastatin  20 mg Oral QHS    celecoxib  200 mg Oral BID    gabapentin  300 mg Oral BID    heparin (porcine)  5,000 Units Subcutaneous Q8H    hydroCHLOROthiazide  12.5 mg Oral Daily    losartan  100 mg Oral Daily    methocarbamol  750 mg Oral TID    mupirocin  1 g Nasal BID    pantoprazole  40 mg Oral Daily    traMADol  100 mg Oral 4 times per day     PRN Meds:acetaminophen, aluminum-magnesium hydroxide-simethicone, bisacodyl, diphenhydrAMINE, HYDROmorphone, melatonin, ondansetron, ondansetron, promethazine (PHENERGAN) IVPB, senna-docusate 8.6-50 mg, sodium chloride 0.9%     Review of Systems  Objective:     Weight: 107 kg (235 lb 14.3 oz)  Body mass index is 39.25 kg/m².  Vital Signs (Most Recent):  Temp: 98.5 °F (36.9 °C) (11/21/19 0802)  Pulse: 83 (11/21/19 0837)  Resp: 18 (11/21/19 0837)  BP: (!) 125/58 (11/21/19 0802)  SpO2: (!) 90 % (11/21/19 0837) Vital Signs (24h Range):  Temp:  [96.6 °F (35.9 °C)-98.5 °F (36.9 °C)]  98.5 °F (36.9 °C)  Pulse:  [69-87] 83  Resp:  [16-20] 18  SpO2:  [86 %-98 %] 90 %  BP: (113-125)/(53-58) 125/58                          Closed/Suction Drain 11/19/19 1245 (Active)   Site Description Unable to view 11/20/2019 10:13 PM   Dressing Type Transparent;Gauze 11/20/2019 10:13 PM   Dressing Status Clean;Dry;Intact 11/20/2019 10:13 PM   Drainage Serosanguineous 11/20/2019 10:13 PM   Status To bulb suction 11/20/2019 10:13 PM   Output (mL) 10 mL 11/21/2019  1:45 AM       Neurosurgery Physical Exam  General: well developed, well nourished. no acute distress.  Comfortable.  Neurologic: Awake, alert and oriented x3. Thought content appropriate.  Head: normocephalic, atraumatic   GCS: Motor: 6/Verbal: 5/Eyes: 4 GCS Total: 15  Cranial nerves:face symmetric and sensation intact bilaterally, tongue midline, pupils equal, round, reactive to light with accomodation, extraocular muscles intact. CN II-XII grossly intact. Shoulder shrug strength equal. Palate rises symmetrically.  Uvula midline. Hearing equal with finger rub. Gag and taste not tested.      Language: no aphasia  Speech: no dysarthria      Sensory: response to light touch throughout  Motor Strength: Moves all extremities spontaneously with good tone. Full strength upper and lower extremities. No abnormal movements seen.       Strength   Deltoids Triceps Biceps Wrist Extension Wrist Flexion Hand    Upper: R 5/5 5/5 5/5 5/5 5/5 5/5     L 5/5 5/5 5/5 5/5 5/5 5/5       Iliopsoas Quadriceps Knee  Flexion Tibialis  anterior Gastro- cnemius EHL   Lower: R 5/5 5/5 5/5 5/5 5/5 5/5     L 5/5 5/5 5/5 5/5 5/5 5/5      Negative straight leg raise bilaterally.   no focal weakness.     ENT: normal hearing with finger rub  Heart: RRR, no cyanosis, pallor, or edema.   Lungs:  normal respiratory effort  Abdomen: soft, non-tender and symmetric  Extremities: warm with no cyanosis, edema, or clubbing  Pulses: palpable distal pulses  Skin: warm, dry and intact. No visible  rashes or lesions.      Posterior incisional wound and left lateral incision:  wound is c/d/i, no signs of infection, no erythema, warmth, edema, ttp,  no drainage.  wound edges are well approximated with staples.     Surgical drain at left lateral incision intact    Significant Labs:  Recent Labs   Lab 11/20/19  0617   *      K 4.8      CO2 26   BUN 21   CREATININE 1.1   CALCIUM 9.2     Recent Labs   Lab 11/20/19  0617   WBC 11.11   HGB 10.5*   HCT 33.1*        No results for input(s): LABPT, INR, APTT in the last 48 hours.  Microbiology Results (last 7 days)     ** No results found for the last 168 hours. **            Significant Diagnostics:  Postop lumbar x-ray with appropriate hardware placement at L4-5.

## 2019-11-21 NOTE — PT/OT/SLP PROGRESS
Physical Therapy Treatment    Patient Name:  Sandrita Kaiser   MRN:  [id number]    Recommendations:     Discharge Recommendations:  home with home health   Discharge Equipment Recommendations: walker, rolling, shower chair   Barriers to discharge: None    Assessment:     Sandrita Kaiser is a 64 y.o. female admitted with a medical diagnosis of Spondylolisthesis of lumbar region.  She presents with the following impairments/functional limitations:  weakness, impaired self care skills, pain, impaired skin, edema, impaired endurance, decreased lower extremity function, decreased ROM, decreased safety awareness . Patient with improved functional mobility. Gait 190 ft with RW and SBA for safety vc for RW management. Sit <> stand Mod I. Able to liss and doff LSO independently. Reviewed spinal precautions and importance of wearing LSO when up at all time. Would benefit from Home Health for home environment assessment .    Rehab Prognosis: Good; patient would benefit from acute skilled PT services to address these deficits and reach maximum level of function.    Recent Surgery: Procedure(s) (LRB):  FUSION, SPINE, LUMBAR Procedure: Stg 1: Left L4-5 oblique interbody fusion (Interbody spacer, allograft) / Stg 2:L4-5 Posterior instrumentation (Left) 2 Days Post-Op    Plan:     During this hospitalization, patient to be seen daily to address the identified rehab impairments via gait training, therapeutic activities, therapeutic exercises and progress toward the following goals:    · Plan of Care Expires:  12/20/19    Subjective     Chief Complaint: weakness  Patient/Family Comments/goals: go home  Pain/Comfort:  · Pain Rating 1: 0/10  · Pain Rating Post-Intervention 1: 0/10      Objective:     Communicated with primary nurse prior to session.  Patient found sitting EOb with primary nurse with peripheral IV, hemovac upon PT entry to room.     General Precautions: Standard, fall   Orthopedic Precautions:spinal precautions    Braces: LSO     Functional Mobility:  · Transfers:     · Sit to Stand:  modified independence with rolling walker  · Gait: 190 ft with RW and SBA  · Balance: fair + with AD      AM-PAC 6 CLICK MOBILITY  Turning over in bed (including adjusting bedclothes, sheets and blankets)?: 3  Sitting down on and standing up from a chair with arms (e.g., wheelchair, bedside commode, etc.): 4  Moving from lying on back to sitting on the side of the bed?: 4  Moving to and from a bed to a chair (including a wheelchair)?: 4  Need to walk in hospital room?: 3  Climbing 3-5 steps with a railing?: 3  Basic Mobility Total Score: 21       Therapeutic Activities and Exercises:   Reviewed posture and proper body mechanics, also spinal precautions    Patient left HOB elevated with all lines intact and call button in reach..    GOALS:   Multidisciplinary Problems     Physical Therapy Goals        Problem: Physical Therapy Goal    Goal Priority Disciplines Outcome Goal Variances Interventions   Physical Therapy Goal     PT, PT/OT Ongoing, Progressing     Description:  Goals to be met by: 2019     Patient will increase functional independence with mobility by performin. Supine to sit with Modified [place]  2. Sit to stand transfer with Modified [place]  3. Gait  x 150 feet with Modified [place] using Rolling Walker.                        Time Tracking:     PT Received On: 19  PT Start Time: 910     PT Stop Time: 930  PT Total Time (min): 20 min     Billable Minutes: Gait Training 20    Treatment Type: Treatment  PT/PTA: PT           Ramos Disla, PT  2019

## 2019-11-21 NOTE — DISCHARGE INSTRUCTIONS
Please follow ONLY the instructions that are checked below.    Activity Restrictions:  [x]  Return to work will be determined on an individual basis.  [x]  No lifting greater than 5-10 pounds.  [x]  Avoid bending and twisting the area of your surgery more than 45 degrees from neutral position in any direction.  [x]  No driving or operating machinery:  [x]  until cleared by your surgeon.  [x]  while taking narcotic pain medications or muscle relaxants.  [x]  Wear LSO brace when out of bed  [x]  Do not sit at 90 degree angle for more than 30 mins at each setting.     [x]  Increase ambulation over the next 2 weeks so that you are walking 2 miles per day at 2 weeks post-operatively.  [x]  Walk on paved surfaces only. It is okay to walk up and down stairs while holding onto a side rail.      Discharge Medication/Follow-up:  [x]  Please refer to discharge medication reconciliation form.  [x]  Prescriptions for appropriate medication will be given upon discharge.   [x]  Pain control:  Take OTC Tylenol for mild pain.  Moderate pain take half tablet of hydrocodone.  For severe pain, may take 1-2 tablets every 6-8 hours as needed.  May supplement with Robaxin as needed for muscle spasms.  [x]  Take docusate (Colace 100 mg): take one capsule a day as needed for constipation. You can get this over the counter.  [x]  Follow-up appointment:  [x]  2 weeks post-op for wound check by physician assistant   [x]  An appointment will be mailed to you.    Wound Care:    [x]  Remove outer clear dressing  2  days after surgery. Keep steri-strip or white tape on until 2 week wound check. Okay if they fall off sooner.  After steri-strips off, Apply bacitracin to incision twice a day until follow up visit. And, keep your incision open to the air.     [x]  You may shower on the 2nd day after your surgery. Have the force of water hit you opposite from the incision. Pat the incision dry after your shower; do not scrub the incision.  [x]  You  cannot take a bath until 8 weeks after surgery.    Call your doctor or go to the Emergency Room for any signs of infection, including: increased redness, drainage, pain, or fever (temperature ?101.5 for 24 hours). Call your doctor or go to the Emergency Room if there are any localized neurological changes; problems with speech, vision, numbness, tingling, weakness, or severe headache; or for other concerns.    Special Instructions:  [x]  No use of tobacco products.  [x]  Diet: Please eat a regular diet as tolerated.  []  Other diet:              Specific physician instructions:           Physicians need 3 days' notice for pain medicine to be refilled. Pain medicine will only be refilled between 8 AM and 5 PM, Monday through Friday, due to Food and Drug Administration regulation of documentation.          If you have any questions about this form, please call [phone] at [facility].    Form No. 29428 (Revised 10/31/2013)    Acetaminophen; Hydrocodone tablets or capsules (English) View Edit Remove  Methocarbamol tablets (English) View Edit Remove

## 2019-11-21 NOTE — PLAN OF CARE
AVS and educational attachments shared with patient and  via [employer]. Discussed thoroughly. Patient and  verbalized clear understanding using teach back method. Notified bedside nurse of completion of education. At present no distress noted. Patient to be discharged via w/c with escort service and family with all of patient's belonings. Will cont to be available to patient and family and intervene prn.

## 2019-11-21 NOTE — PLAN OF CARE
Problem: Occupational Therapy Goal  Goal: Occupational Therapy Goal  Description  Goals to be met by: 12/20/19     Patient will increase functional independence with ADLs by performing:    LE Dressing with Supervision.  Grooming while standing with Supervision.  Toileting from toilet with Supervision for hygiene and clothing management.   Supine to sit with Supervision.  Step transfer with Supervision  Toilet transfer to toilet with Supervision.  Upper extremity exercise program x10 reps per handout, with independence.      11/21/2019 1553 by Anne Pemberton OT  Outcome: Ongoing, Progressing      Pt progressing well towards goals today's session; performing functional mobility and ADLs supervision at this time. Recommending RW for home use. Educated on home safety. Set for d/c today's date.

## 2019-11-21 NOTE — NURSING
"Patient voided 600cc urine residual per bladder scan was 999ml.  Dr. Miller notified  stated " In and out cath her again".     "

## 2019-11-21 NOTE — PLAN OF CARE
Discharge rounds on patient. Discussed followup appointments. TN will call patient tomorrow 11/22 with urology follow appointment. Patient and family aware. Home health has been arranged through [facility]. Home Health nurse will visit patient tomorrow, 11/22. Discharge nurse will go over home medications and reasons for medications and final discharge instructions. All patient/caregiver questions answered. Patient verbalized understanding.       11/21/19 1524   Final Note   Assessment Type Final Discharge Note   Anticipated Discharge Disposition Home-Health   What phone number can be called within the next 1-3 days to see how you are doing after discharge? [phone]   Hospital Follow Up  Appt(s) scheduled? Yes   Discharge plans and expectations educations in teach back method with documentation complete? Yes   Right Care Referral Info   Post Acute Recommendation Home-care   Referral Type Home Care   Facility Name [facility]       Future Appointments   Date Time Provider Department Center   12/6/2019  9:15 AM [facility] ORTHO CLINIC LIMIT 350LBS [facility]   12/6/2019  9:40 AM Marshal Meyers PA-C [facility]   12/30/2019 11:15 AM [facility] ORTHO CLINIC LIMIT 350LBS [facility]   12/30/2019 11:30 AM Ramos Miller MD [facility]   1/15/2020 11:00 AM Adolfo Hollingsworth MD [facility]

## 2019-11-21 NOTE — DISCHARGE SUMMARY
"[facility]  Neurosurgery  Discharge Summary      Patient Name: Sandrita Kaiser  MRN: [id number]  Admission Date: 11/19/2019  Hospital Length of Stay: 2 days  Discharge Date and Time:  11/21/2019 8:57 AM  Attending Physician: Ramos Miller MD   Discharging Provider: Marshal Meyers PA-C  Primary Care Provider: Adolfo Hollingsworth MD    HPI:   Sandrita Kaiser is 63 y.o.  female with history of former smoker (quit 03/10/2001), hyperlipidemia and hypertension who was complaining of significant back pain and radiating leg pain with feet paresthesias.  She had placement of the inter spinous spacer in an outside facility by pain management in February 2019 for lateral recess stenosis with radiculopathy. After the surgery she was able to stand more straight but 1 month later she started having more back pain.  She feels a sensation of instability in her back.  She feels that her back is "popping" more.  Pain is constant but worse with activity such as walking, biking, twisting. Back pain is moderate at this time but interfering with her QOL. She did 3-4 months of PT in the past.    Lumbar spine MRI 2019: L4-5 grade one spondylolisthesis with bilateral lateral recess stenosis  Lumbar XR today: worsening L4-5 spondylolisthesis    She was recommended a left L4-5 oblique interbody fusion placement interbody spacer using allograft, L4-5 posterior instrumentation     We have discussed the risks of surgery including bleeding, infection, failure of surgery, CSF leak, nerve root injury, spinal cord injury, ureter injury, weakness, paralysis, peripheral neuropathy, malplaced hardware, migration of hardware, non-union, need for reoperation. Patient understands the risks and would like to proceed with surgery.          Procedure(s) (LRB):  FUSION, SPINE, LUMBAR Procedure: Stg 1: Left L4-5 oblique interbody fusion (Interbody spacer, allograft) / Stg 2:L4-5 Posterior instrumentation (Left)     Hospital Course: " 11/19/2019:  OR for  left L4-5 oblique interbody fusion placement interbody spacer using allograft, L4-5 posterior instrumentation  11/20:  No acute events overnight.  Hypotension overnight (systolic blood pressure 90s-100s)  Pain well controlled on current regimen.  She continues have incisional back pain and bilateral feet paresthesia radiating to her calf.  She requires 1 time straight cath at 5:00 a.m. secondary to urinary retention.  Holding a.m. hypertensive med secondary to lower pressure.  Recommended patient to continue increase p.o. Intake.  Pending standing x-rays and physical therapy evaluation today.  Surgical drain kept secondary to bloody output.  11/21:  No acute events overnight.  Vitals stable (systolic blood pressure 120).  Decreased appetite; no bowel movement but flatus present. Pain well controlled on current regimen.  Preoperative back pain improved significantly.  Requiring 3 straight calf since surgery secondary to urinary retention.  Patient is voiding but has continued high postvoid residual (500-900 CC).  Discussed with Dr. Clark, who recommends replacing Jimenez and have her follow-up with her and one-week for voiding trial.  Surgical drain removed secondary to low output and wound was reinforced with staples.  Postop x-rays appropriate hardware placement.  Plan to DC home today with rolling walker as patient already has shower chair at home.  Patient is 2 weeks wound check with neurosurgery and 6 with follow-up Dr. Miller in clinic.        Pending Diagnostic Studies:     None        Final Active Diagnoses:    Diagnosis Date Noted POA    PRINCIPAL PROBLEM:  Spondylolisthesis of lumbar region [M43.16] 11/19/2019 Yes      Problems Resolved During this Admission:      Discharged Condition: good    Disposition:  home with home health therapy and rolling walker    Follow Up:  2 weeks wound check and 6 week follow-up with Neurosurgery    Patient Instructions:      WALKER FOR HOME USE     Order  "Specific Question Answer Comments   Type of Walker: Adult (5'4"-6'6")    With wheels? Yes    Height: 5' 5" (1.651 m)    Weight: 107 kg (235 lb 14.3 oz)    Length of need (1-99 months): 99    Does patient have medical equipment at home? cane, straight    Please check all that apply: Patient's condition impairs ambulation.    Please check all that apply: Walker will be used for gait training.    Please check all that apply: Patient needs help to get in and out of chair.    Please check all that apply: Patient is unable to safely ambulate without equipment.      Medications:  Reconciled Home Medications:      Medication List      START taking these medications    HYDROcodone-acetaminophen 5-325 mg per tablet  Commonly known as:  NORCO  Take 1 tablet by mouth every 6 (six) hours as needed for Pain.     methocarbamol 750 MG Tab  Commonly known as:  ROBAXIN  Take 1 tablet (750 mg total) by mouth 3 (three) times daily as needed (Muscle spasm).        CONTINUE taking these medications    atorvastatin 20 MG tablet  Commonly known as:  LIPITOR  Take 1 tablet (20 mg total) by mouth once daily.     Fluzone High-Dose 2019-20 (PF) 180 mcg/0.5 mL Syrg  Generic drug:  flu vacc [id number](65yr up)PF  ADM 0.5ML IM UTD     gabapentin 300 MG capsule  Commonly known as:  NEURONTIN  Take 300 mg by mouth once daily.     ICAPS AREDS ORAL  Take by mouth.     losartan-hydrochlorothiazide 100-12.5 mg 100-12.5 mg Tab  Commonly known as:  HYZAAR  Take 1 tablet by mouth once daily.     multivitamin per tablet  Commonly known as:  THERAGRAN  Take 1 tablet by mouth once daily.     omeprazole 20 MG capsule  Commonly known as:  PRILOSEC  Take 20 mg by mouth once daily.        STOP taking these medications    ibuprofen 600 MG tablet  Commonly known as:  CORNELIUS LUZ PA-C  Neurosurgery  [facility]  "

## 2019-11-22 ENCOUNTER — OUTSIDE PLACE OF SERVICE (OUTPATIENT)
Dept: ADMINISTRATIVE | Facility: HOSPITAL | Age: 64
End: 2019-11-22
Payer: MEDICARE

## 2019-11-22 DIAGNOSIS — Z47.89 ENCOUNTER FOR OTHER ORTHOPEDIC AFTERCARE: ICD-10-CM

## 2019-11-22 PROCEDURE — G0180 MD CERTIFICATION HHA PATIENT: HCPCS | Mod: ,,, | Performed by: FAMILY MEDICINE

## 2019-11-22 PROCEDURE — G0180 PR HOME HEALTH MD CERTIFICATION: ICD-10-PCS | Mod: ,,, | Performed by: FAMILY MEDICINE

## 2019-11-22 NOTE — PROGRESS NOTES
Patient inquired about having her 1 week urology follow in [place]. [place] contacted OKSANA Araya with Neurosurgery to make sure it was ok. OKSANA Araya agreed. Patient 1 week Urology follow up made.    Future Appointments   Date Time Provider Department Center   11/27/2019 10:00 AM LIBERTY Currie 2C UROLOGY [place] [facility]   12/6/2019  9:15 AM [facility] ORTHO CLINIC LIMIT 350LBS [facility] OR[facility]RAY [facility]   12/6/2019  9:40 AM Marshal Meyers PA-C [facility] NEUROSU [facility]   12/30/2019 11:15 AM [facility] ORTHO CLINIC LIMIT 350LBS [facility]   12/30/2019 11:30 AM Ramos Miller MD [facility] NEUROSU [facility]   1/15/2020 11:00 AM Adolfo Hollingsworth MD [facility]

## 2019-11-22 NOTE — NURSING
Geller catheter inserted per OKSANA Rain at bedside. Pt voided 500cc prior to insertion. Sterile technique employed upon insertion. Pt tolerated well. Pt educated to geller care extensively by this RN with return demonstration by patient. At this time 600mL pale, clear urine returned, pt to work with OT. Will document in flowsheets.

## 2019-11-22 NOTE — NURSING
This RN in to discuss d/c plan with pt. Pt educated extensively again by this RN to geller care, s/s to watch for and report to MD. Pt verbalized understanding of all instructions and provided return demonstration to this RN with emptying leg and standard bag. Pt verbalized understanding to keeping drainage bag below level of bladder at all times. Family present at bedside and verbalized understanding to care as well. VN will review d/c with pt. Pt voiced no other questions or concerns at this time.

## 2019-11-25 ENCOUNTER — PATIENT OUTREACH (OUTPATIENT)
Dept: ADMINISTRATIVE | Facility: CLINIC | Age: 64
End: 2019-11-25

## 2019-11-25 NOTE — PATIENT INSTRUCTIONS
Wound Check After Surgery, No Complication  Surgery involves cutting through layers of skin, fatty tissue, muscle, and sometimes bone and cartilage. Sutures (stitches) or staples are used to close all layers of the wound. The sutures on the inside will dissolve in about 2 to 3 weeks. Any sutures or staples used on the outside need to be removed in about 7 to 14 days, depending on the location.  It is normal to have some clear or bloody discharge on the wound covering or bandage (dressing) for the first few days after surgery. If your wound was sutured (sewn) closed, you should not have to change the dressing more than twice a day in the first few days. Bleeding or discharge requiring more frequent dressing changes can be a sign of a problem.  It is normal to feel pain at the incision site. The pain decreases as the wound heals. Most of the pain and soreness from the skin incision should go away by the time the sutures or staples are removed. Soreness and pain from deeper tissues may last another week or two.  Pain that continues more than a few weeks after surgery or pain that worsens anytime after surgery can be a sign of a problem, such as:  · Infection  · Separation of wound edges  · Collection of blood or other below the skin  Home care  Different types of surgery require different types of care and dressing changes. It is important to follow all instructions and advice from your surgeon, as well as other members of your healthcare team.  Wound care  · Keep the wound clean, as directed by your healthcare provider.  · Change the dressing as directed. Change the dressing sooner if it becomes wet or stained with blood or fluid from the wound.  · Bathe with a sponge (no shower or tub baths) for the first few days after surgery, or until there is no more drainage from the wound. Unless you received different instructions from your surgeon, you can then shower. Do not soak the area in water (no baths or swimming)  until the tape, sutures, or staples are removed and any wound opening has dried out and healed.  Changing the dressing  · Wash your hands before changing the dressings.  · Carefully remove the dressing and tape; dont just yank it off. If it sticks to the wound, you may need to wet it a little to remove it, unless your healthcare provider told you not to wet it.  · Wash your hands again before putting on a new, clean dressing.  · Gently clean the wound with clean water (or saline) using gauze or a clean washcloth. Do not rub it or pick at it.  · Do not use soap, alcohol, hydrogen peroxide, or any other cleanser.  · If you were told to dry the wound before putting on a new dressing, gently pat it dry. Do not rub.  · Throw out the old dressing. Do not reuse it!  · Wash your hands again when you are done.  Types of dressings  Your healthcare team will tell you what type of dressing to put on your wound. Follow your healthcare teams instructions carefully, and contact them if you have any questions. Two common types of dressings are described below. You may have one of these or another type.  · Dry dressing. Use dry gauze. If the wound is still draining, use a nonadherent dressing, which shouldnt stick to the wound.  · Wet-to-dry dressing. Wet the gauze, and squeeze out the excess water (or saline), before putting it on. Then, cover this with a dry pad.  Medicines  · If you were given antibiotics, take them until they are used up or your healthcare provider tells you to stop. It is important to finish the antibiotics even though you feel better, to make sure the infection has cleared.  · You can take acetaminophen or ibuprofen for pain, unless you were given a different pain medicine to use. (Note: If you have chronic liver or kidney disease, or have ever had a stomach ulcer or gastrointestinal bleeding, or are taking blood thinner medicines, talk with your healthcare provider before using these  medicines.)  · Aspirin should never be used in anyone under 18 years of age who is ill with a fever. It may cause severe liver damage.  Follow-up care  Follow up with your healthcare provider, or as advised, for your next wound check or removal of your sutures, staples, or tape.  · If a culture was done, you will be notified if the results will affect your treatment. You can call as directed for the results.  · If imaging tests, such as X-rays, an ultrasound, or CT scan were done, they will be reviewed by a specialist. You will be notified of the results, especially if they affect treatment.  Call 911  Call emergency services right away if any of these occur:  · Trouble breathing or swallowing, wheezing  · Hoarse voice or trouble speaking  · Extreme confusion  · Extreme drowsiness or trouble awakening  · Fainting or loss of consciousness  · Rapid heart rate or very slow heart rate  · Vomiting blood, or large amounts of blood in stool  · Discomfort in the center of the chest that feels like pressure, squeezing, a sense of fullness, or pain.  · Discomfort or pain in other upper body areas, such as the back, one or both arms, neck, jaw, or stomach  · Stroke symptoms (spot a stroke FAST)  ¨ F: Face drooping. One side of the face is numb or droops.  ¨ A: Arm weakness. One arm feels weak or numb.  ¨ S: Speech difficulty: Speech is slurred, or the person is unable to speak.  ¨ T: Time to call 911. Even if symptoms go away, call 911.  When to seek medical advice  Call your healthcare provider right away if any of the following occur:  · Increasing pain at the site of surgery  · Fever over 100.4º F (38º C)  · Redness around the wound  · Fluid, pus, or blood draining from the wound  · Vomiting, constipation, or diarrhea  Date Last Reviewed: 9/27/2015  © 8496-4622 [employer]. [address] [place] [address], [place] [address]. All rights reserved. This information is not intended as a substitute for professional  medical care. Always follow your healthcare professional's instructions.

## 2019-11-26 ENCOUNTER — TELEPHONE (OUTPATIENT)
Dept: FAMILY MEDICINE | Facility: CLINIC | Age: 64
End: 2019-11-26

## 2019-11-26 NOTE — TELEPHONE ENCOUNTER
----- Message from Felecia Subramanian sent at 11/25/2019 10:01 AM CST -----  Pt needs a hospital follow up appt within the next two weeks. She had surgery on 11/19. Pt needs to increase her gabapentin. She is still in pain. Pt #[phone]

## 2019-11-27 ENCOUNTER — TELEPHONE (OUTPATIENT)
Dept: NEUROSURGERY | Facility: CLINIC | Age: 64
End: 2019-11-27

## 2019-11-27 ENCOUNTER — OFFICE VISIT (OUTPATIENT)
Dept: UROLOGY | Facility: CLINIC | Age: 64
End: 2019-11-27
Payer: MEDICARE

## 2019-11-27 ENCOUNTER — TELEPHONE (OUTPATIENT)
Dept: FAMILY MEDICINE | Facility: CLINIC | Age: 64
End: 2019-11-27

## 2019-11-27 VITALS
SYSTOLIC BLOOD PRESSURE: 119 MMHG | BODY MASS INDEX: 35.15 KG/M2 | RESPIRATION RATE: 18 BRPM | HEIGHT: 66 IN | WEIGHT: 218.69 LBS | TEMPERATURE: 98 F | DIASTOLIC BLOOD PRESSURE: 60 MMHG | HEART RATE: 78 BPM

## 2019-11-27 DIAGNOSIS — R33.9 URINARY RETENTION: Primary | ICD-10-CM

## 2019-11-27 DIAGNOSIS — Z97.8 FOLEY CATHETER IN PLACE PRIOR TO ARRIVAL: ICD-10-CM

## 2019-11-27 PROCEDURE — 1159F MED LIST DOCD IN RCRD: CPT | Mod: ,,, | Performed by: NURSE PRACTITIONER

## 2019-11-27 PROCEDURE — 99999 PR PBB SHADOW E&M-EST. PATIENT-LVL IV: CPT | Mod: PBBFAC,,, | Performed by: NURSE PRACTITIONER

## 2019-11-27 PROCEDURE — 1126F PR PAIN SEVERITY QUANTIFIED, NO PAIN PRESENT: ICD-10-PCS | Mod: ,,, | Performed by: NURSE PRACTITIONER

## 2019-11-27 PROCEDURE — 1159F PR MEDICATION LIST DOCUMENTED IN MEDICAL RECORD: ICD-10-PCS | Mod: ,,, | Performed by: NURSE PRACTITIONER

## 2019-11-27 PROCEDURE — 99203 PR OFFICE/OUTPT VISIT, NEW, LEVL III, 30-44 MIN: ICD-10-PCS | Mod: S$PBB,,, | Performed by: NURSE PRACTITIONER

## 2019-11-27 PROCEDURE — 99214 OFFICE O/P EST MOD 30 MIN: CPT | Mod: PBBFAC,PN | Performed by: NURSE PRACTITIONER

## 2019-11-27 PROCEDURE — 99999 PR PBB SHADOW E&M-EST. PATIENT-LVL IV: ICD-10-PCS | Mod: PBBFAC,,, | Performed by: NURSE PRACTITIONER

## 2019-11-27 PROCEDURE — 1126F AMNT PAIN NOTED NONE PRSNT: CPT | Mod: ,,, | Performed by: NURSE PRACTITIONER

## 2019-11-27 PROCEDURE — 99203 OFFICE O/P NEW LOW 30 MIN: CPT | Mod: S$PBB,,, | Performed by: NURSE PRACTITIONER

## 2019-11-27 RX ORDER — HYDROCODONE BITARTRATE AND ACETAMINOPHEN 5; 325 MG/1; MG/1
1 TABLET ORAL EVERY 6 HOURS PRN
Qty: 60 TABLET | Refills: 0 | Status: SHIPPED | OUTPATIENT
Start: 2019-11-27 | End: 2020-03-19

## 2019-11-27 RX ORDER — LIDOCAINE AND PRILOCAINE 25; 25 MG/G; MG/G
CREAM TOPICAL
Refills: 3 | COMMUNITY
Start: 2019-11-20 | End: 2020-03-19

## 2019-11-27 RX ORDER — GEL BASE NO.184
GEL (GRAM) TOPICAL
Refills: 3 | COMMUNITY
Start: 2019-11-20 | End: 2020-03-19

## 2019-11-27 NOTE — PROGRESS NOTES
"[facility] Note  Staff: SANDOVAL Lew    PCP: Dr. Adolfo Hollingsworth    Chief Complaint: Hospital Follow-up: Urinary Retention S/P Surgical procedure    Subjective:        HPI: Sandrita Kaiser is a 64 y.o. female NEW PT presents today for hospital follow-up.  On 11/19/2019 pt had the following procedure performed at [facility] by Dr. Ramos Miller, Neurosurgeon  Procedure(s) (LRB):  FUSION, SPINE, LUMBAR Procedure: Stg 1: Left L4-5 oblique interbody fusion (Interbody spacer, allograft) / Stg 2:L4-5 Posterior instrumentation (Left)     During hospitalization after surgery, pt required "3" in and out cath's due to inability for pt to urinate at that time.  During hospital stay, pt did urinate on her own but when checked she still had continuous high pvr's between 500-900 mL, therefore an indwelling catheter was inserted prior to discharging pt.     TODAY, pt in clinic with significant other, geller cath in place and intact with no issues.  Pt has never had trouble with urination prior to her recent back surgery.    REVIEW OF SYSTEMS:  A comprehensive 10 system review was performed and is negative except as noted above in HPI    PMHx:  Past Medical History:   Diagnosis Date    High cholesterol     Hypertension      PSHx:  Past Surgical History:   Procedure Laterality Date    BREAST BIOPSY      LUMBAR FUSION Left 11/19/2019    Procedure: FUSION, SPINE, LUMBAR Procedure: Stg 1: Left L4-5 oblique interbody fusion (Interbody spacer, allograft) / Stg 2:L4-5 Posterior instrumentation;  Surgeon: Ramos Miller MD;  Location: [facility] OR;  Service: Neurosurgery;  Laterality: Left;  FUSION, SPINE, LUMBAR  Procedure: Stg 1: Left L4-5 oblique interbody fusion (Interbody spacer, allograft) / Stg 2:L4-5 Posterior instrumentation  Surg    TONSILLECTOMY      TUBAL LIGATION       Allergies:  Lisinopril and Adhesive    Medications: reviewed   Objective:     Vitals:    11/27/19 1023 "   BP: 119/60   Pulse: 78   Resp: 18   Temp: 98.1 °F (36.7 °C)     Physical Exam   Vitals reviewed.  Constitutional: She is oriented to person, place, and time. She appears well-developed and well-nourished.   HENT:   Head: Normocephalic and atraumatic.   Eyes: Conjunctivae and EOM are normal. Pupils are equal, round, and reactive to light.   Neck: Normal range of motion. Neck supple.   Cardiovascular: Normal rate, regular rhythm, normal heart sounds and intact distal pulses.    Pulmonary/Chest: Effort normal and breath sounds normal.   Abdominal: Soft. Bowel sounds are normal.   Musculoskeletal: Normal range of motion.   Neurological: She is alert and oriented to person, place, and time. She has normal reflexes.   Skin: Skin is warm and dry.     Psychiatric: She has a normal mood and affect. Her behavior is normal. Judgment and thought content normal.     Procedure Note:  Jimenez catheter discontinued by me during clinic visit today, tip intact.  Pt tolerated with no problems at this time.    Assessment:       1. Urinary retention    2. Jimenez catheter in place prior to arrival          Plan:   Urinary Retention s/p back surgery:  1.  Pt was instructed to go home and increase p.o. Fluids especially water today, and if unable to urinate then go to her nearest ER for bladder scan and possible reinsertion of catheter.  Providers are all out of office this afternoon.  Pt vu.    F/u if needed/should pt remain with retention.    [facility]: Active    Melanie Perkins, SANDOVAL

## 2019-11-27 NOTE — TELEPHONE ENCOUNTER
Spoke with Mrs. Kaiser, she request Refill on [place].  She  states she is taking it every six hours, but has 30 tablets  remaining now and wanted to put request in prior to next week.     Script was picked up 11/21/19 per pharmacist- pt is aware script is PRN pt v/u

## 2019-11-27 NOTE — TELEPHONE ENCOUNTER
----- Message from Saba Madden sent at 11/27/2019 11:48 AM CST -----  Contact: LMOR   The patient said someone called her from here. She did not get to answer her phone. She did not leave a number. GH

## 2019-11-27 NOTE — TELEPHONE ENCOUNTER
----- Message from Lashay Velasco sent at 11/27/2019  9:02 AM CST -----  Contact: self / [phone]  Needs a call back on her medications. Please advise       HYDROcodone-acetaminophen (NORCO) 5-325 mg per tablet 60 tablet       methocarbamol (ROBAXIN) 750 MG Tab 60 tablet     Pharmacy     [id number] - [phone] AT [facility] [address]

## 2019-12-02 ENCOUNTER — OFFICE VISIT (OUTPATIENT)
Dept: FAMILY MEDICINE | Facility: CLINIC | Age: 64
End: 2019-12-02
Payer: MEDICARE

## 2019-12-02 VITALS
DIASTOLIC BLOOD PRESSURE: 70 MMHG | BODY MASS INDEX: 35.68 KG/M2 | HEART RATE: 68 BPM | SYSTOLIC BLOOD PRESSURE: 126 MMHG | WEIGHT: 222 LBS | HEIGHT: 66 IN

## 2019-12-02 DIAGNOSIS — E78.5 HYPERLIPIDEMIA, UNSPECIFIED HYPERLIPIDEMIA TYPE: ICD-10-CM

## 2019-12-02 DIAGNOSIS — R33.9 URINARY RETENTION: ICD-10-CM

## 2019-12-02 DIAGNOSIS — K21.9 GASTROESOPHAGEAL REFLUX DISEASE, ESOPHAGITIS PRESENCE NOT SPECIFIED: ICD-10-CM

## 2019-12-02 DIAGNOSIS — I10 ESSENTIAL HYPERTENSION: ICD-10-CM

## 2019-12-02 DIAGNOSIS — M43.16 SPONDYLOLISTHESIS OF LUMBAR REGION: Primary | ICD-10-CM

## 2019-12-02 PROCEDURE — 99496 TRANSJ CARE MGMT HIGH F2F 7D: CPT | Mod: S$GLB,,, | Performed by: PHYSICIAN ASSISTANT

## 2019-12-02 PROCEDURE — 99496 TRANSITIONAL CARE MANAGE SERVICE 7 DAY DISCHARGE: ICD-10-PCS | Mod: S$GLB,,, | Performed by: PHYSICIAN ASSISTANT

## 2019-12-02 RX ORDER — ATORVASTATIN CALCIUM 20 MG/1
20 TABLET, FILM COATED ORAL DAILY
Qty: 90 TABLET | Refills: 1 | Status: SHIPPED | OUTPATIENT
Start: 2019-12-02 | End: 2020-03-19 | Stop reason: SDUPTHER

## 2019-12-02 RX ORDER — GABAPENTIN 300 MG/1
300 CAPSULE ORAL 2 TIMES DAILY
Qty: 60 CAPSULE | Refills: 2 | Status: SHIPPED | OUTPATIENT
Start: 2019-12-02 | End: 2019-12-06

## 2019-12-02 NOTE — PATIENT INSTRUCTIONS
Common Spine and Disk Problems  The most common serious back problems happen when disks tear, bulge, or rupture. In such cases, an injured disk can no longer cushion the vertebrae and absorb shock. As a result, the rest of your spine may also weaken. This can lead to pain, stiffness, and other symptoms.     Torn annulus      Contained herniated disk      Extruded herniated disk   · Torn annulus. A sudden movement may cause a tiny tear in an annulus. Nearby ligaments may stretch.  · Contained herniated disk. As a disk wears out, the nucleus may bulge into the annulus and press on nerves.  · Extruded herniated disk. When a disk ruptures, its nucleus can squeeze out and irritate a nerve.     Arthritis      Instability      Spondylolisthesis   · Arthritis. As disks wear out over time, bone spurs form. These growths can irritate nerves and inflame facets.  · Instability. As a disk stretches, the vertebrae slip back and forth. This can put pressure on the annulus.  · Spondylolisthesis. Listhesis is a condition in which one vertebra has moved forward or backward, in relation to the one above or below it. This causes a crack (stress fracture) in the areas that link the vertebrae together. This may put pressure on the annulus, stretch the disk, and irritate nerves.  Date Last Reviewed: 10/18/2015  © 2116-2665 [employer]. [address] [place] [address], [place] [address]. All rights reserved. This information is not intended as a substitute for professional medical care. Always follow your healthcare professional's instructions.

## 2019-12-02 NOTE — PROGRESS NOTES
SUBJECTIVE:    Patient ID: Sandrita Kaiser is a 64 y.o. female.    Chief Complaint: Hospital Follow Up (Admitted to hospital on 11/19/19 for Spondylolisthesis of lumbar region...Pt would also like to increase her Gabapentin.....mlr)    64-year-old white female presents today for hospital follow-up visit.  She was admitted on November 19th and then discharged on the 21st after a successful lumbar fusion with Dr. Miller.  All discharge records and hospital course are available for review in the patient's chart.  It seems that her postop course was uneventful other than mild urinary retention.  She has already been evaluated with Urology.  She was instructed to increase fluid intake.  She has not followed up with her surgeon yet.  Today she reports      Admission on 11/19/2019, Discharged on 11/21/2019   Component Date Value Ref Range Status    POCT Glucose 11/19/2019 145* 70 - 110 mg/dL Final    WBC 11/20/2019 11.11  3.90 - 12.70 K/uL Final    RBC 11/20/2019 3.77* 4.00 - 5.40 M/uL Final    Hemoglobin 11/20/2019 10.5* 12.0 - 16.0 g/dL Final    Hematocrit 11/20/2019 33.1* 37.0 - 48.5 % Final    Mean Corpuscular Volume 11/20/2019 88  82 - 98 fL Final    Mean Corpuscular Hemoglobin 11/20/2019 27.9  27.0 - 31.0 pg Final    Mean Corpuscular Hemoglobin Conc 11/20/2019 31.7* 32.0 - 36.0 g/dL Final    RDW 11/20/2019 15.3* 11.5 - 14.5 % Final    Platelets 11/20/2019 277  150 - 350 K/uL Final    MPV 11/20/2019 8.9* 9.2 - 12.9 fL Final    Gran # (ANC) 11/20/2019 9.1* 1.8 - 7.7 K/uL Final    Lymph # 11/20/2019 1.2  1.0 - 4.8 K/uL Final    Mono # 11/20/2019 0.8  0.3 - 1.0 K/uL Final    Eos # 11/20/2019 0.0  0.0 - 0.5 K/uL Final    Baso # 11/20/2019 0.00  0.00 - 0.20 K/uL Final    Gran% 11/20/2019 82.0* 38.0 - 73.0 % Final    Lymph% 11/20/2019 10.4* 18.0 - 48.0 % Final    Mono% 11/20/2019 7.6  4.0 - 15.0 % Final    Eosinophil% 11/20/2019 0.0  0.0 - 8.0 % Final    Basophil% 11/20/2019 0.0  0.0 - 1.9 % Final     Differential Method 11/20/2019 Automated   Final    Sodium 11/20/2019 140  136 - 145 mmol/L Final    Potassium 11/20/2019 4.8  3.5 - 5.1 mmol/L Final    Chloride 11/20/2019 104  95 - 110 mmol/L Final    CO2 11/20/2019 26  23 - 29 mmol/L Final    Glucose 11/20/2019 114* 70 - 110 mg/dL Final    BUN, Bld 11/20/2019 21  8 - 23 mg/dL Final    Creatinine 11/20/2019 1.1  0.5 - 1.4 mg/dL Final    Calcium 11/20/2019 9.2  8.7 - 10.5 mg/dL Final    Anion Gap 11/20/2019 10  8 - 16 mmol/L Final    eGFR if African American 11/20/2019 >60  >60 mL/min/1.73 m^2 Final    eGFR if non African American 11/20/2019 52* >60 mL/min/1.73 m^2 Final   Lab Visit on 11/13/2019   Component Date Value Ref Range Status    Group & Rh 11/13/2019 O NEG   Final    Indirect Claudia 11/13/2019 NEG   Final   Office Visit on 10/29/2019   Component Date Value Ref Range Status    WBC 10/29/2019 6.8  3.8 - 10.8 Thousand/uL Final    RBC 10/29/2019 4.70  3.80 - 5.10 Million/uL Final    Hemoglobin 10/29/2019 13.3  11.7 - 15.5 g/dL Final    Hematocrit 10/29/2019 39.3  35.0 - 45.0 % Final    Mean Corpuscular Volume 10/29/2019 83.6  80.0 - 100.0 fL Final    Mean Corpuscular Hemoglobin 10/29/2019 28.3  27.0 - 33.0 pg Final    Mean Corpuscular Hemoglobin Conc 10/29/2019 33.8  32.0 - 36.0 g/dL Final    RDW 10/29/2019 14.2  11.0 - 15.0 % Final    Platelets 10/29/2019 324  140 - 400 Thousand/uL Final    MPV 10/29/2019 9.6  7.5 - 12.5 fL Final    Neutrophils Absolute 10/29/2019 3,305  1,500 - 7,800 cells/uL Final    Lymph # 10/29/2019 2,734  850 - 3,900 cells/uL Final    Mono # 10/29/2019 530  200 - 950 cells/uL Final    Eos # 10/29/2019 190  15 - 500 cells/uL Final    Baso # 10/29/2019 41  0 - 200 cells/uL Final    Neutrophils Relative 10/29/2019 48.6  % Final    Lymph% 10/29/2019 40.2  % Final    Mono% 10/29/2019 7.8  % Final    Eosinophil% 10/29/2019 2.8  % Final    Basophil% 10/29/2019 0.6  % Final    Glucose 10/29/2019 95  65 -  99 mg/dL Final    BUN, Bld 10/29/2019 13  7 - 25 mg/dL Final    Creatinine 10/29/2019 0.96  0.50 - 0.99 mg/dL Final    eGFR if non African American 10/29/2019 62  > OR = 60 mL/min/1.73m2 Final    eGFR if  10/29/2019 71  > OR = 60 mL/min/1.73m2 Final    BUN/Creatinine Ratio 10/29/2019 NOT APPLICABLE  6 - 22 (calc) Final    Sodium 10/29/2019 135  135 - 146 mmol/L Final    Potassium 10/29/2019 4.4  3.5 - 5.3 mmol/L Final    Chloride 10/29/2019 97* 98 - 110 mmol/L Final    CO2 10/29/2019 30  20 - 32 mmol/L Final    Calcium 10/29/2019 10.0  8.6 - 10.4 mg/dL Final    Total Protein 10/29/2019 7.3  6.1 - 8.1 g/dL Final    Albumin 10/29/2019 4.3  3.6 - 5.1 g/dL Final    Globulin, Total 10/29/2019 3.0  1.9 - 3.7 g/dL (calc) Final    Albumin/Globulin Ratio 10/29/2019 1.4  1.0 - 2.5 (calc) Final    Total Bilirubin 10/29/2019 0.5  0.2 - 1.2 mg/dL Final    Alkaline Phosphatase 10/29/2019 91  33 - 130 U/L Final    AST 10/29/2019 17  10 - 35 U/L Final    ALT 10/29/2019 24  6 - 29 U/L Final    INR 10/29/2019 0.9   Final    Prothrombin Time 10/29/2019 9.8  9.0 - 11.5 sec Final    aPTT 10/29/2019 30  22 - 34 sec Final    Color, UA 10/29/2019 YELLOW  YELLOW Final    Appearance, UA 10/29/2019 CLEAR  CLEAR Final    Specific Excel, UA 10/29/2019 1.015  1.001 - 1.035 Final    pH, UA 10/29/2019 8.0  5.0 - 8.0 Final    Glucose, UA 10/29/2019 NEGATIVE  NEGATIVE Final    Bilirubin, UA 10/29/2019 NEGATIVE  NEGATIVE Final    Ketones, UA 10/29/2019 NEGATIVE  NEGATIVE Final    Occult Blood UA 10/29/2019 NEGATIVE  NEGATIVE Final    Protein, UA 10/29/2019 NEGATIVE  NEGATIVE Final    Nitrite, UA 10/29/2019 NEGATIVE  NEGATIVE Final    Leukocytes, UA 10/29/2019 NEGATIVE  NEGATIVE Final    WBC Casts, UA 10/29/2019 NONE SEEN  < OR = 5 /HPF Final    RBC Casts, UA 10/29/2019 NONE SEEN  < OR = 2 /HPF Final    Squam Epithel, UA 10/29/2019 0-5  < OR = 5 /HPF Final    Bacteria, UA 10/29/2019 NONE SEEN  NONE  SEEN /HPF Final    Hyaline Casts, UA 10/29/2019 NONE SEEN  NONE SEEN /LPF Final    Reflexive Urine Culture 10/29/2019 NO CULTURE INDICATED   Final       Past Medical History:   Diagnosis Date    High cholesterol     Hypertension      Past Surgical History:   Procedure Laterality Date    BREAST BIOPSY      LUMBAR FUSION Left 11/19/2019    Procedure: FUSION, SPINE, LUMBAR Procedure: Stg 1: Left L4-5 oblique interbody fusion (Interbody spacer, allograft) / Stg 2:L4-5 Posterior instrumentation;  Surgeon: Ramos Miller MD;  Location: [facility];  Service: Neurosurgery;  Laterality: Left;  FUSION, SPINE, LUMBAR  Procedure: Stg 1: Left L4-5 oblique interbody fusion (Interbody spacer, allograft) / Stg 2:L4-5 Posterior instrumentation  Surg    TONSILLECTOMY      TUBAL LIGATION       History reviewed. No pertinent family history.    Marital Status:   Alcohol History:  reports that she does not drink alcohol.  Tobacco History:  reports that she has quit smoking. She quit smokeless tobacco use about 18 years ago.  Drug History:  reports that she does not use drugs.    Review of patient's allergies indicates:   Allergen Reactions    Lisinopril      Other reaction(s): BETTE    Adhesive Rash       Current Outpatient Medications:     atorvastatin (LIPITOR) 20 MG tablet, Take 1 tablet (20 mg total) by mouth once daily., Disp: 90 tablet, Rfl: 1    gabapentin (NEURONTIN) 300 MG capsule, Take 1 capsule (300 mg total) by mouth 2 (two) times daily., Disp: 60 capsule, Rfl: 2    HYDROcodone-acetaminophen (NORCO) 5-325 mg per tablet, Take 1 tablet by mouth every 6 (six) hours as needed for Pain., Disp: 60 tablet, Rfl: 0    losartan-hydrochlorothiazide 100-12.5 mg (HYZAAR) 100-12.5 mg Tab, Take 1 tablet by mouth once daily. , Disp: , Rfl: 1    methocarbamol (ROBAXIN) 750 MG Tab, Take 1 tablet (750 mg total) by mouth 3 (three) times daily as needed (Muscle spasm)., Disp: 60 tablet, Rfl: 0    multivitamin (THERAGRAN) per  "tablet, Take 1 tablet by mouth once daily., Disp: , Rfl:     omeprazole (PRILOSEC) 20 MG capsule, Take 20 mg by mouth once daily., Disp: , Rfl:     vit A/vit C/vit E/zinc/copper (ICAPS AREDS ORAL), Take by mouth., Disp: , Rfl:     lidocaine-prilocaine (EMLA) cream, APPLY 1-2 GRAMS TO THE AFFECTED AREAS 3-4 TIMES DAILY STARTING 2 WEEKS POST SURGERY., Disp: , Rfl: 3    SANARE ADV SCAR THERAPY BASE Gel, APPLY 1/2 GRAM (1 PUMP) TO AFFECTED AREAS TWICE DAILY STARTING 4 WEEKS POST SURGERY., Disp: , Rfl: 3    Review of Systems   Constitutional: Negative for appetite change, chills, fatigue, fever and unexpected weight change.   HENT: Negative for congestion.    Respiratory: Negative for cough, chest tightness and shortness of breath.    Cardiovascular: Negative for chest pain and palpitations.   Gastrointestinal: Negative for abdominal distention and abdominal pain.   Endocrine: Negative for cold intolerance and heat intolerance.   Genitourinary: Negative for difficulty urinating and dysuria.   Musculoskeletal: Positive for back pain. Negative for arthralgias, gait problem and myalgias.   Neurological: Negative for dizziness, weakness and headaches.          Objective:      Vitals:    12/02/19 0715   BP: 126/70   Pulse: 68   Weight: 100.7 kg (222 lb)   Height: 5' 5.5" (1.664 m)     Physical Exam   Constitutional: She is oriented to person, place, and time. She appears well-developed and well-nourished. No distress.   Mobile with assistance of cane   HENT:   Head: Normocephalic and atraumatic.   Eyes: Pupils are equal, round, and reactive to light. Conjunctivae and EOM are normal.   Neck: Normal range of motion. Neck supple. No thyromegaly present.   Cardiovascular: Normal rate, regular rhythm, normal heart sounds and intact distal pulses.   Pulmonary/Chest: Effort normal and breath sounds normal.   Abdominal: Soft. Bowel sounds are normal. She exhibits no distension. There is no tenderness.   Musculoskeletal: Normal " range of motion.   Lumbar brace in place.   Neurological: She is alert and oriented to person, place, and time. No cranial nerve deficit.   Skin: Skin is warm and dry. No erythema.   Psychiatric: She has a normal mood and affect.         Assessment:       1. Spondylolisthesis of lumbar region    2. Hyperlipidemia, unspecified hyperlipidemia type    3. Essential hypertension    4. Gastroesophageal reflux disease, esophagitis presence not specified    5. Urinary retention         Plan:       Spondylolisthesis of lumbar region  Comments:  She appears to be doing very well status post lumbar fusion with Dr. Miller. Requesting increase in gabapentin to BID dose.  Orders:  -     gabapentin (NEURONTIN) 300 MG capsule; Take 1 capsule (300 mg total) by mouth 2 (two) times daily.  Dispense: 60 capsule; Refill: 2    Hyperlipidemia, unspecified hyperlipidemia type  Comments:  Stable.  Request refills  Orders:  -     atorvastatin (LIPITOR) 20 MG tablet; Take 1 tablet (20 mg total) by mouth once daily.  Dispense: 90 tablet; Refill: 1    Essential hypertension  Comments:  Blood pressure is well controlled at this time.  Continue his is    Gastroesophageal reflux disease, esophagitis presence not specified    Urinary retention  Comments:  Appears to be back to her baseline and voiding much better. No issues at this time.      Follow up if symptoms worsen or fail to improve, for as scheduled with Dr. Hollingsworth.        12/2/2019 Richard Hardy PA-C

## 2019-12-04 ENCOUNTER — TELEPHONE (OUTPATIENT)
Dept: HOME HEALTH SERVICES | Facility: HOSPITAL | Age: 64
End: 2019-12-04

## 2019-12-06 ENCOUNTER — OFFICE VISIT (OUTPATIENT)
Dept: NEUROSURGERY | Facility: CLINIC | Age: 64
End: 2019-12-06
Payer: MEDICARE

## 2019-12-06 ENCOUNTER — HOSPITAL ENCOUNTER (OUTPATIENT)
Dept: RADIOLOGY | Facility: HOSPITAL | Age: 64
Discharge: HOME | End: 2019-12-06
Attending: NEUROLOGICAL SURGERY
Payer: MEDICARE

## 2019-12-06 VITALS — DIASTOLIC BLOOD PRESSURE: 62 MMHG | SYSTOLIC BLOOD PRESSURE: 112 MMHG | TEMPERATURE: 98 F | HEART RATE: 70 BPM

## 2019-12-06 DIAGNOSIS — Z51.89 VISIT FOR WOUND CHECK: Primary | ICD-10-CM

## 2019-12-06 DIAGNOSIS — Z98.1 S/P LUMBAR FUSION: ICD-10-CM

## 2019-12-06 DIAGNOSIS — M43.16 SPONDYLOLISTHESIS OF LUMBAR REGION: ICD-10-CM

## 2019-12-06 PROCEDURE — 99999 PR PBB SHADOW E&M-EST. PATIENT-LVL III: CPT | Mod: PBBFAC,,, | Performed by: PHYSICIAN ASSISTANT

## 2019-12-06 PROCEDURE — 72100 X-RAY EXAM L-S SPINE 2/3 VWS: CPT | Mod: 26,,, | Performed by: RADIOLOGY

## 2019-12-06 PROCEDURE — 72100 XR LUMBAR SPINE AP AND LATERAL: ICD-10-PCS | Mod: 26,,, | Performed by: RADIOLOGY

## 2019-12-06 PROCEDURE — 99024 PR POST-OP FOLLOW-UP VISIT: ICD-10-PCS | Mod: POP,,, | Performed by: PHYSICIAN ASSISTANT

## 2019-12-06 PROCEDURE — 99213 OFFICE O/P EST LOW 20 MIN: CPT | Mod: PBBFAC,25,PN | Performed by: PHYSICIAN ASSISTANT

## 2019-12-06 PROCEDURE — 99024 POSTOP FOLLOW-UP VISIT: CPT | Mod: POP,,, | Performed by: PHYSICIAN ASSISTANT

## 2019-12-06 PROCEDURE — 99999 PR PBB SHADOW E&M-EST. PATIENT-LVL III: ICD-10-PCS | Mod: PBBFAC,,, | Performed by: PHYSICIAN ASSISTANT

## 2019-12-06 PROCEDURE — 72100 X-RAY EXAM L-S SPINE 2/3 VWS: CPT | Mod: TC,PN

## 2019-12-06 RX ORDER — GABAPENTIN 300 MG/1
300 CAPSULE ORAL 3 TIMES DAILY
Qty: 90 CAPSULE | Refills: 6 | Status: SHIPPED | OUTPATIENT
Start: 2019-12-06 | End: 2020-01-23 | Stop reason: SDUPTHER

## 2019-12-06 RX ORDER — METHOCARBAMOL 750 MG/1
750 TABLET, FILM COATED ORAL 3 TIMES DAILY PRN
Qty: 60 TABLET | Refills: 0 | Status: SHIPPED | OUTPATIENT
Start: 2019-12-06 | End: 2019-12-16

## 2019-12-06 NOTE — PROGRESS NOTES
Neurosurgery Wound Check Progress Note    HPI  Sandrita Kaiser is 64 y.o. with history of former smoker (quit 03/10/2001), hyperlipidemia and hypertension who is status post  left L4-5 oblique interbody fusion placement interbody spacer using allograft, L4-5 posterior instrumentation with Dr. Miller on 11/19/2019.  She is here with her  for 2 weeks wound check.  Patient states that she is doing very well overall.  Incisional pain and back muscle spasms controlled with current regimen of gabapentin 300 mg b.i.d., Norco Q 8-12 hours, and Robaxin.  She continues to have bilateral numbness/tingling radiating down lateral her leg into her foot; this has improved by 50% since surgery.  She is overall very happy with surgery.  No longer hearing popping in her back.  She is voiding appropriately after Jimenez removal with urology on 11/27/2019.  Doing home health therapy and walking throughout the day.        Low Back Pain Scale  R Low Back-Pain Score: 5  R Low Back-Pain Intensity: Pain killers give moderate relief from pain  R Low Back-Pain Score: I can look after myself normally but it causes extra pain  Low Back-Lifting: I can only lift very light weights   Low Back-Walking: I can only walk using a cane or crutches   Low Back-Standing: I cannot stand for longer than 30 mins without increasing pain   Low Back-Sleeping: I have pain in bed but it does not prevent me from sleeping well   Low Back-Social Life: Pain has no significant effect on my social life apart from limiting my more en   Low Back-Traveling: Pain restricts me to short necessary journeys under 30 minutes   Low Back-Changing Degree of Pain: My pain fluctuates but is definitely getting better           EXAM  Vitals:    12/06/19 0943   BP: 112/62   Pulse: 70   Temp: 98.2 °F (36.8 °C)     There is no height or weight on file to calculate BMI.      General: well developed, well nourished. no acute distress. Comfortable.   Neurologic: Awake, alert and  oriented x3. Thought content appropriate.  Head: normocephalic, atraumatic    GCS: Motor: 6/Verbal: 5/Eyes: 4 GCS Total: 15  Cranial nerves: face symmetric, tongue midline, pupils equal, round, reactive to light with accomodation, extraocular muscles intact. CN II-XII grossly intact.    Sensory: response to light touch throughout  Motor Strength: Moves all extremities spontaneously with good tone. Full strength upper and lower extremities. No abnormal movements seen.      Strength   Deltoids Triceps Biceps Wrist Extension Wrist Flexion Hand    Upper: R 5/5 5/5 5/5 5/5 5/5 5/5     L 5/5 5/5 5/5 5/5 5/5 5/5       Iliopsoas Quadriceps Knee  Flexion Tibialis  anterior Gastro- cnemius EHL   Lower: R 5/5 5/5 5/5 5/5 5/5 5/5     L 5/5 5/5 5/5 5/5 5/5 5/5       Negative straight leg raise    No focal numbness or weakness   No midline or paraspinal tenderness to palpation  Gait is normal.       Heart: RRR, no cyanosis, pallor, or edema.    Lungs: normal respiratory effort  Abdomen: soft, non-tender and symmetric  Extremities: warm with no cyanosis, edema, or clubbing  Skin: warm, dry and intact. No visible rashes or lesions.        Left lateral and posterior lumbar Surgical incision:  C/D/I without any signs of infection.  Incision is healing well   No erythema, warmth, edema, TTP.  No drainage.  Wound edges are well approximated.    Staples intact and removed without difficulty.          IMAGING/LABS  Lumbar AP lateral x-rays 12/06/2019 personally reviewed and compared to previous imaging   Appropriate L4-5 interbody fusion with posterior instrumentation.  No migration/fracture of hardware      ASSESSMENT/PLAN    64-year-old female who is 2 weeks status post left L4-5 oblique interbody fusion with posterior instrumentation    -Patient is doing well postoperatively.  Preoperative symptom improved by 50%.  Some incisional pain and muscle spasm that is expected. Incision is healing well. Imaging stable. I have reiterated  wound care, activity restrictions, and follow up appts as below.       -continue home health therapy at this time.  -increase gabapentin 300 mg t.i.d..  Discussed weaning Norco as tolerated (recent refill on 11/27/2019 per Dr. Miller), Rx given for Robaxin p.r.n. muscle spasms.  -May continue using lidocaine and diclofenac gel p.r.n. muscle pain.  Do not place on incision directly.  -Do not submerge incision for another 6 weeks. Pat the incision dry after your shower.  -Patient to continue using bacitracin twice a day for another 1-2 weeks.  -Keep incision open to air. Turn q2h to promote appropriate wound healing.  -LSO brace when OOB.   -Continue p.r.n. stool softener and miralax to prevent constipation with pain med use.   -Increase ambulation. No heavy lifting >10lbs.   No over bending or twisting at incisional site.  Fall precautions.  -Patient has follow up with Dr. Miller in 4-6 weeks with lumbar xrays.     All questions were answered. Patient was encouraged to call clinic with any future concerns prior to follow up appt. If any worsening symptoms, patient should report to ED.     Please call with any questions.    Marshal Meyers PA-C  Neurosurgery      Note dictated with voice recognition software, please excuse any grammatical errors.

## 2019-12-10 ENCOUNTER — TELEPHONE (OUTPATIENT)
Dept: NEUROSURGERY | Facility: CLINIC | Age: 64
End: 2019-12-10

## 2019-12-10 NOTE — TELEPHONE ENCOUNTER
----- Message from Kelton Pinto sent at 12/10/2019  3:02 PM CST -----  Contact: Pt  Pt would like to be called back regarding discontinue nursing and keeping the physical therapy.  Pt can be reached at [phone].    Thank You.

## 2019-12-10 NOTE — TELEPHONE ENCOUNTER
Spoke with Ms. Awilda, she states she would like to be d/c from . Per Ms. Kaiser an order is needed prior to being d/c.    Left message  w/  representative re: pt request to be D/C     She would like to keep OT/PT.

## 2019-12-23 ENCOUNTER — TELEPHONE (OUTPATIENT)
Dept: NEUROSURGERY | Facility: CLINIC | Age: 64
End: 2019-12-23

## 2019-12-23 NOTE — TELEPHONE ENCOUNTER
----- Message from Nelli Gambino sent at 12/23/2019  9:20 AM CST -----  Contact: self  Refill request for:  HYDROcodone-acetaminophen (NORCO) 5-325 mg per tablet    Be sent to:  [facility]- [phone]

## 2019-12-24 ENCOUNTER — TELEPHONE (OUTPATIENT)
Dept: NEUROSURGERY | Facility: CLINIC | Age: 64
End: 2019-12-24

## 2019-12-24 NOTE — TELEPHONE ENCOUNTER
----- Message from Yesika Man sent at 12/24/2019  8:39 AM CST -----  Contact: [phone] /self  Refill request for:  HYDROcodone-acetaminophen (NORCO) 5-325 mg per tablet     [facility] #[id number] - [address] AT [facility] & [facility]    She will run out of her medication on Carol day. Please refill today. Thanks

## 2019-12-26 RX ORDER — TRAMADOL HYDROCHLORIDE 50 MG/1
50 TABLET ORAL EVERY 8 HOURS PRN
Qty: 90 TABLET | Refills: 0 | Status: SHIPPED | OUTPATIENT
Start: 2019-12-26 | End: 2020-01-27

## 2019-12-30 ENCOUNTER — HOSPITAL ENCOUNTER (OUTPATIENT)
Dept: RADIOLOGY | Facility: HOSPITAL | Age: 64
Discharge: HOME | End: 2019-12-30
Attending: NEUROLOGICAL SURGERY
Payer: MEDICARE

## 2019-12-30 ENCOUNTER — OFFICE VISIT (OUTPATIENT)
Dept: NEUROSURGERY | Facility: CLINIC | Age: 64
End: 2019-12-30
Payer: MEDICARE

## 2019-12-30 VITALS
BODY MASS INDEX: 35.68 KG/M2 | HEIGHT: 66 IN | DIASTOLIC BLOOD PRESSURE: 75 MMHG | HEART RATE: 64 BPM | WEIGHT: 222 LBS | SYSTOLIC BLOOD PRESSURE: 130 MMHG

## 2019-12-30 DIAGNOSIS — Z98.1 S/P LUMBAR FUSION: Primary | ICD-10-CM

## 2019-12-30 DIAGNOSIS — Z98.1 S/P LUMBAR FUSION: ICD-10-CM

## 2019-12-30 PROCEDURE — 99999 PR PBB SHADOW E&M-EST. PATIENT-LVL IV: ICD-10-PCS | Mod: PBBFAC,,, | Performed by: NEUROLOGICAL SURGERY

## 2019-12-30 PROCEDURE — 99999 PR PBB SHADOW E&M-EST. PATIENT-LVL IV: CPT | Mod: PBBFAC,,, | Performed by: NEUROLOGICAL SURGERY

## 2019-12-30 PROCEDURE — 99024 PR POST-OP FOLLOW-UP VISIT: ICD-10-PCS | Mod: POP,,, | Performed by: NEUROLOGICAL SURGERY

## 2019-12-30 PROCEDURE — 72100 X-RAY EXAM L-S SPINE 2/3 VWS: CPT | Mod: TC,PN

## 2019-12-30 PROCEDURE — 99024 POSTOP FOLLOW-UP VISIT: CPT | Mod: POP,,, | Performed by: NEUROLOGICAL SURGERY

## 2019-12-30 PROCEDURE — 72100 XR LUMBAR SPINE AP AND LATERAL: ICD-10-PCS | Mod: 26,,, | Performed by: RADIOLOGY

## 2019-12-30 PROCEDURE — 72100 X-RAY EXAM L-S SPINE 2/3 VWS: CPT | Mod: 26,,, | Performed by: RADIOLOGY

## 2019-12-30 PROCEDURE — 99214 OFFICE O/P EST MOD 30 MIN: CPT | Mod: PBBFAC,25,PN | Performed by: NEUROLOGICAL SURGERY

## 2019-12-30 NOTE — PROGRESS NOTES
NEUROSURGICAL POST-OPERATIVE PROGRESS NOTE    DATE OF SERVICE:  12/30/2019      ATTENDING PHYSICIAN:  Ramos Miller MD    SUBJECTIVE:    INTERIM HISTORY:    This is a very pleasant 64 y.o. y.o. female, who is status post L4-5 OLIF with posterior instrumentation.  She is doing very well without continues back pain.  She only reports to occasion that she woke up in the middle of the night with severe back pain.  She takes tramadol 3 times daily.  Denies having significant leg pain.  Has become more active.  Extremities satisfied with her outcome.  This completed health physical therapy.  Has been compliant with wearing her back brace.      Low Back Pain Scale  R Low Back-Pain Score: 2  R Low Back-Pain Intensity: Pain killers give complete relief from pain  R Low Back-Pain Score: I need some help, but manage most of my personal care  Low Back-Lifting: I can only lift very light weights   Low Back-Walking: (2+miles)   Low Back-Sitting: (not over 30 min @ a 90 ')   Low Back-Standing: I cannot stand for longer than 30 mins without increasing pain   Low Back-Sleeping: (with meds)   Low Back-Traveling: Pain restricts me to short necessary journeys under 30 minutes   Low Back-Changing Degree of Pain: My pain seems to be getting better but improvement is slow         OBJECTIVE:    PHYSICAL EXAMINATION:   Vitals:    12/30/19 1145   BP: 130/75   Pulse: 64       Neurosurgery Physical Exam    Ortho Exam    Neurologic Exam     Motor Exam     Strength   Strength 5/5 throughout.       Wound has healed.    DIAGNOSTIC DATA:    X-ray of the lumbar spine, AP and lateral views reveals the fusion hardware is intact. No loosening of screws or migration of hardware.    ASSESMENT:    This is a 64 y.o. female who is s/p 6 weeks L4-5 oblique interbody fusion with posterior instrumentation.  Significant improvement in pain compared to pre operatively.  Resolution of leg pain.    Problem List Items Addressed This Visit     None      Visit  Diagnoses     S/P lumbar fusion    -  Primary    Relevant Orders    Ambulatory consult to Physical Therapy          PLAN:    Okay to start driving  Okay to start outpatient physical therapy 3 times a week for 6 weeks  Okay to DC back brace  All questions answered  Follow-up in 6 weeks with repeat lumbar x-ray      Ramos Miller MD  Pager: [phone]

## 2019-12-30 NOTE — TELEPHONE ENCOUNTER
Left message for the patient to return my call regarding medication change from norco to tramadol.

## 2020-01-02 ENCOUNTER — TELEPHONE (OUTPATIENT)
Dept: NEUROSURGERY | Facility: CLINIC | Age: 65
End: 2020-01-02

## 2020-01-02 NOTE — TELEPHONE ENCOUNTER
----- Message from Letha Lujan sent at 1/2/2020  3:16 PM CST -----  Contact: pt  Please give pt a call at .[phone] (home) regarding a refill on her METHOCARBAMOL 750 Mg Tablet            [facility] #[id number] [address] & [address] [address]  Phone: [phone] Fax: [phone]

## 2020-01-02 NOTE — TELEPHONE ENCOUNTER
----- Message from Paresh Angel sent at 1/2/2020  2:45 PM CST -----  Contact: Tiffany Kaiser  Needs refill on her losartan-hydrochlorothiazide   Send to [facility] [address]  Pt# [phone]

## 2020-01-03 RX ORDER — LOSARTAN POTASSIUM AND HYDROCHLOROTHIAZIDE 12.5; 1 MG/1; MG/1
1 TABLET ORAL DAILY
Qty: 90 TABLET | Refills: 1 | Status: SHIPPED | OUTPATIENT
Start: 2020-01-03 | End: 2020-06-02 | Stop reason: SDUPTHER

## 2020-01-03 RX ORDER — METHOCARBAMOL 750 MG/1
750 TABLET, FILM COATED ORAL 3 TIMES DAILY PRN
Qty: 90 TABLET | Refills: 2 | Status: SHIPPED | OUTPATIENT
Start: 2020-01-03 | End: 2020-01-13

## 2020-01-23 DIAGNOSIS — M43.16 SPONDYLOLISTHESIS OF LUMBAR REGION: ICD-10-CM

## 2020-01-23 NOTE — TELEPHONE ENCOUNTER
----- Message from Jesenia Oreilly sent at 1/23/2020 11:47 AM CST -----  Contact: pt  Pt would like to be called back regarding refill  on Gabapentin 300 mg 3Xday and Tramadol 50 mg  [facility] [phone]  Pt can be reached at [phone]  
20-Dec-2018

## 2020-01-27 RX ORDER — GABAPENTIN 300 MG/1
300 CAPSULE ORAL 3 TIMES DAILY
Qty: 90 CAPSULE | Refills: 6 | Status: SHIPPED | OUTPATIENT
Start: 2020-01-27 | End: 2020-09-22

## 2020-01-27 RX ORDER — TRAMADOL HYDROCHLORIDE 50 MG/1
TABLET ORAL
Qty: 90 TABLET | OUTPATIENT
Start: 2020-01-27

## 2020-01-27 RX ORDER — TRAMADOL HYDROCHLORIDE 50 MG/1
TABLET ORAL
Qty: 90 TABLET | Refills: 0 | Status: SHIPPED | OUTPATIENT
Start: 2020-01-27 | End: 2020-02-24

## 2020-01-27 NOTE — TELEPHONE ENCOUNTER
----- Message from Manpreet Motley sent at 1/27/2020  2:16 PM CST -----  Contact: Patient  Refill request for:    traMADol (ULTRAM) 50 mg tablet      Be sent to:      [id number] - [address] & [facility]   [phone] (Phone)  [phone] (Fax)    Patient only has one pill left and needs her prescription filled as soon as possible.

## 2020-02-19 ENCOUNTER — OFFICE VISIT (OUTPATIENT)
Dept: NEUROSURGERY | Facility: CLINIC | Age: 65
End: 2020-02-19
Payer: MEDICARE

## 2020-02-19 ENCOUNTER — HOSPITAL ENCOUNTER (OUTPATIENT)
Dept: RADIOLOGY | Facility: HOSPITAL | Age: 65
Discharge: HOME | End: 2020-02-19
Attending: NEUROLOGICAL SURGERY
Payer: MEDICARE

## 2020-02-19 VITALS
HEART RATE: 66 BPM | SYSTOLIC BLOOD PRESSURE: 142 MMHG | WEIGHT: 222 LBS | HEIGHT: 66 IN | DIASTOLIC BLOOD PRESSURE: 86 MMHG | BODY MASS INDEX: 35.68 KG/M2

## 2020-02-19 DIAGNOSIS — Z98.1 S/P LUMBAR FUSION: ICD-10-CM

## 2020-02-19 DIAGNOSIS — M43.16 SPONDYLOLISTHESIS OF LUMBAR REGION: Primary | ICD-10-CM

## 2020-02-19 PROCEDURE — 72100 XR LUMBAR SPINE AP AND LATERAL: ICD-10-PCS | Mod: 26,,, | Performed by: RADIOLOGY

## 2020-02-19 PROCEDURE — 72100 X-RAY EXAM L-S SPINE 2/3 VWS: CPT | Mod: TC,PN

## 2020-02-19 PROCEDURE — 99999 PR PBB SHADOW E&M-EST. PATIENT-LVL III: ICD-10-PCS | Mod: PBBFAC,,, | Performed by: PHYSICIAN ASSISTANT

## 2020-02-19 PROCEDURE — 99999 PR PBB SHADOW E&M-EST. PATIENT-LVL III: CPT | Mod: PBBFAC,,, | Performed by: PHYSICIAN ASSISTANT

## 2020-02-19 PROCEDURE — 72100 X-RAY EXAM L-S SPINE 2/3 VWS: CPT | Mod: 26,,, | Performed by: RADIOLOGY

## 2020-02-19 PROCEDURE — 99024 POSTOP FOLLOW-UP VISIT: CPT | Mod: POP,,, | Performed by: PHYSICIAN ASSISTANT

## 2020-02-19 PROCEDURE — 99213 OFFICE O/P EST LOW 20 MIN: CPT | Mod: PBBFAC,25,PN | Performed by: PHYSICIAN ASSISTANT

## 2020-02-19 PROCEDURE — 99024 PR POST-OP FOLLOW-UP VISIT: ICD-10-PCS | Mod: POP,,, | Performed by: PHYSICIAN ASSISTANT

## 2020-02-19 RX ORDER — METHOCARBAMOL 750 MG/1
TABLET, FILM COATED ORAL
COMMUNITY
Start: 2020-02-04 | End: 2020-05-11

## 2020-02-19 NOTE — PROGRESS NOTES
Established Patient    SUBJECTIVE:     Note dictated with voice recognition software, please excuse any grammatical errors.    History of Present Illness:  Sandrita Kaiser is a 64 y.o. female with history of former smoker (quit 03/10/2001), hyperlipidemia and hypertension who is status post  left L4-5 oblique interbody fusion placement interbody spacer using allograft, L4-5 posterior instrumentation with Dr. Miller on 11/19/2019.  She is now 3 months status post L4-5 fusion.  Patient is doing very well overall.  Occasionally, has some low back muscle spasms.  She has been undergoing lumbar physical therapy since last visit and wishes to continue for another 4 weeks.  She reports of 80% improvements and preoperative back and leg pain.  She continues have bilateral feet paresthesia for which she takes gabapentin 300 mg t.i.d..  She is walking a lot better and now able to walk up to 2 miles per day.  Overall she is very happy with her results.      Low Back Pain Scale  R Low Back-Pain Score: 2  Personal Care : I can look after myself normally, but it causes extra pain.  Walking: Pain does not prevent me from walking any distance.  Sitting: (2 TO 2 1/2 HRS)   Low Back-Standing: I cannot stand for longer than 30 mins without increasing pain   Low Back-Sleeping: I have no pain in bed  Social Life: My social life is normal, but it increases the degree of pain.   Low Back-Traveling: I have some pain when traveling but vivian of my usual forms of travel make it any worse   Low Back-Changing Degree of Pain: My pain fluctuates but is definitely getting better             Review of patient's allergies indicates:   Allergen Reactions    Lisinopril      Other reaction(s): BETTE    Adhesive Rash       Current Outpatient Medications   Medication Sig Dispense Refill    atorvastatin (LIPITOR) 20 MG tablet Take 1 tablet (20 mg total) by mouth once daily. 90 tablet 1    gabapentin (NEURONTIN) 300 MG capsule Take 1 capsule (300 mg  total) by mouth 3 (three) times daily. 90 capsule 6    losartan-hydrochlorothiazide 100-12.5 mg (HYZAAR) 100-12.5 mg Tab Take 1 tablet by mouth once daily. 90 tablet 1    methocarbamol (ROBAXIN) 750 MG Tab       multivitamin (THERAGRAN) per tablet Take 1 tablet by mouth once daily.      omeprazole (PRILOSEC) 20 MG capsule Take 20 mg by mouth once daily.      traMADol (ULTRAM) 50 mg tablet TAKE 1 TABLET(50 MG) BY MOUTH EVERY 8 HOURS AS NEEDED FOR PAIN 90 tablet 0    vit A/vit C/vit E/zinc/copper (ICAPS AREDS ORAL) Take by mouth.      HYDROcodone-acetaminophen (NORCO) 5-325 mg per tablet Take 1 tablet by mouth every 6 (six) hours as needed for Pain. (Patient not taking: Reported on 2/19/2020) 60 tablet 0    lidocaine-prilocaine (EMLA) cream APPLY 1-2 GRAMS TO THE AFFECTED AREAS 3-4 TIMES DAILY STARTING 2 WEEKS POST SURGERY.  3    SANARE ADV SCAR THERAPY BASE Gel APPLY 1/2 GRAM (1 PUMP) TO AFFECTED AREAS TWICE DAILY STARTING 4 WEEKS POST SURGERY.  3     No current facility-administered medications for this visit.        Past Medical History:   Diagnosis Date    High cholesterol     Hypertension      Past Surgical History:   Procedure Laterality Date    BREAST BIOPSY      LUMBAR FUSION Left 11/19/2019    Procedure: FUSION, SPINE, LUMBAR Procedure: Stg 1: Left L4-5 oblique interbody fusion (Interbody spacer, allograft) / Stg 2:L4-5 Posterior instrumentation;  Surgeon: Ramos Miller MD;  Location: [facility] OR;  Service: Neurosurgery;  Laterality: Left;  FUSION, SPINE, LUMBAR  Procedure: Stg 1: Left L4-5 oblique interbody fusion (Interbody spacer, allograft) / Stg 2:L4-5 Posterior instrumentation  Surg    TONSILLECTOMY      TUBAL LIGATION       Family History     None        Social History     Socioeconomic History    Marital status:      Spouse name: Not on file    Number of children: Not on file    Years of education: Not on file    Highest education level: Not on file   Occupational History     "Not on file   Social Needs    Financial resource strain: Not on file    Food insecurity:     Worry: Not on file     Inability: Not on file    Transportation needs:     Medical: Not on file     Non-medical: Not on file   Tobacco Use    Smoking status: Former Smoker    Smokeless tobacco: Former User     Quit date: 3/10/2001    Tobacco comment: 03/10/2001-   Substance and Sexual Activity    Alcohol use: Never     Frequency: Never    Drug use: Never    Sexual activity: Yes     Partners: Male   Lifestyle    Physical activity:     Days per week: Not on file     Minutes per session: Not on file    Stress: Not at all   Relationships    Social connections:     Talks on phone: Not on file     Gets together: Not on file     Attends [employer] service: Not on file     Active member of club or organization: Not on file     Attends meetings of clubs or organizations: Not on file     Relationship status: Not on file   Other Topics Concern    Not on file   Social History Narrative    Not on file       Review of Systems:  Review of Systems    Constitutional: no fever, chills or night sweats. No changes in weight   Eyes: no visual changes   ENT: no nasal congestion or sore throat   Respiratory: no cough or shortness of breath   Cardiovascular: no chest pain or palpitations   Gastrointestinal: no nausea or vomiting   Genitourinary: no hematuria or dysuria   Integument/Breast: no rash or pruritis   Hematologic/Lymphatic: no easy bruising or lymphadenopathy   Musculoskeletal: no arthralgias or myalgias.  Neurological: no seizures or tremors. No weakness or paresthesia.   Behavioral/Psych: no auditory or visual hallucinations   Endocrine: no heat or cold intolerance     OBJECTIVE:     Vital Signs  Pulse: 66  BP: (!) 142/86  Pain Score:   2  Height: 5' 5.5" (166.4 cm)  Weight: 100.7 kg (222 lb)  Body mass index is 36.38 kg/m².    Physical Exam:  Neurosurgery Physical Exam    General: well developed, well nourished, no " distress.   Neurologic: Awake, alert and oriented x3. Thought content appropriate.  GCS: Motor: 6/Verbal: 5/Eyes: 4 GCS Total: 15  Cranial nerves: II-XII grossly intact. PERRLA. EOMI without nystagmus. Face symmetric and sensation intact to light touch, tongue midline, shoulder shrug symmetric bilaterally.  Hearing equal bilaterally to finger rub. Palate and uvula rise and fall normally in midline.    Language: no aphasia  Speech: no dysarthria   Neck: supple, without obvious masses    Sensory: intact to light touch B/L UE and LE  Motor Strength: Moves all extremities spontaneously with good tone. Full strength upper and lower extremities. No abnormal movements seen.    Strength  Deltoids Triceps Biceps Wrist Extension Wrist Flexion Hand    Upper: R 5/5 5/5 5/5 5/5 5/5 5/5    L 5/5 5/5 5/5 5/5 5/5 5/5     Iliopsoas Quadriceps Knee  Flexion Tibialis  anterior Gastro- cnemius EHL   Lower: R 5/5 5/5 5/5 5/5 5/5 5/5    L 5/5 5/5 5/5 5/5 5/5 5/5        SLR - Negative   Gait - normal      Tandem Gait - No difficulty    Able to walk/stand on heels & toes Lumbar ROM - full; no pain with all  No focal numbness or weakness  No midline or paraspinal tenderness to palpation  No difficulty transitioning from seated to standing position or vice versa.  ENT: normal hearing with finger rub  Heart: RRR, no cyanosis, pallor, or edema.   Lungs- normal respiratory effort  Abdomen-  soft, symmetric and nontender  Skin: grossly intact in all 4 extremities without obvious rashes or lesions  Extremities: warm with no cyanosis or edema, or clubbing  Pulses: palpable distal pulses    SI Joint tenderness - Negative    Posture-  No obvious kyphosis with standing posture.  With bending posture, no obvious scapula wing        Diagnostic Results:  All imaging personally reviewed    Lumbar x-rays 02/19/2029  L4-5 interbody fusion with posterior instrumentation.  No migration of hardware    ASSESSMENT/PLAN:     64-year-old female who is 3  months status post L4-5 oblique interbody fusion    -patient doing very well after surgery surgery.  She has improved functions and walking a lot better.  -discussed with patient only take tramadol p.r.n. severe pain.  She can take Robaxin for muscle spasms.  Continue gabapentin 300 mg t.i.d. for bilateral chronic feet paresthesias.  -continue lumbar physical therapy.  -follow-up Dr. Miller and 3 months for 6 month postop visit with lumbar x-rays prior.        All imaging were reviewed with patient and staff. All questions were answered.  Patient verbalized understanding and agreed with the above plan of care.  Patient was encouraged to call clinic with any future concerns prior to follow up appt.     Please call with any questions.    Discussed with Dr. Miller and he agreed with the above plan of care.     [facility]          Note dictated with voice recognition software, please excuse any grammatical errors.

## 2020-02-24 RX ORDER — TRAMADOL HYDROCHLORIDE 50 MG/1
TABLET ORAL
Qty: 90 TABLET | Refills: 0 | Status: SHIPPED | OUTPATIENT
Start: 2020-02-24 | End: 2020-07-15

## 2020-03-03 ENCOUNTER — TELEPHONE (OUTPATIENT)
Dept: NEUROSURGERY | Facility: CLINIC | Age: 65
End: 2020-03-03

## 2020-03-03 NOTE — TELEPHONE ENCOUNTER
----- Message from Melania Moreno sent at 3/3/2020  2:10 PM CST -----  Contact: [facility]  Ms. Armendariz is calling to speak with Staff regarding a progress note that has been faxed three or four times and wants to make sure it is signed.  Pt is running out of visits and the pt was told Dr. Miller would sign it but has not.  She is requesting a returned call to [phone], Option 4.  The fax number is [phone].    Thank you.

## 2020-03-17 ENCOUNTER — TELEPHONE (OUTPATIENT)
Dept: FAMILY MEDICINE | Facility: CLINIC | Age: 65
End: 2020-03-17

## 2020-03-17 NOTE — TELEPHONE ENCOUNTER
----- Message from Concepcion Kee sent at 3/17/2020 11:58 AM CDT -----  Pt has appt 3-20-20 wants to keep time slot and do a virtual visit please convert she does have an compatible iphone 8 and is active on the portal.  Pt ask if COVID-19 symptoms come in a certain order. CB # [phone].

## 2020-03-18 ENCOUNTER — TELEPHONE (OUTPATIENT)
Dept: FAMILY MEDICINE | Facility: CLINIC | Age: 65
End: 2020-03-18

## 2020-03-18 NOTE — TELEPHONE ENCOUNTER
----- Message from Paresh Angel sent at 3/18/2020 11:37 AM CDT -----  Contact: Sandrita Kaiser  Pt says that instead of doing the virtual visit her and her  will just come in for their appt on 03/20th .   Pt# [phone]

## 2020-03-19 ENCOUNTER — OFFICE VISIT (OUTPATIENT)
Dept: FAMILY MEDICINE | Facility: CLINIC | Age: 65
End: 2020-03-19
Payer: MEDICARE

## 2020-03-19 VITALS
DIASTOLIC BLOOD PRESSURE: 64 MMHG | SYSTOLIC BLOOD PRESSURE: 114 MMHG | WEIGHT: 211 LBS | BODY MASS INDEX: 33.91 KG/M2 | HEART RATE: 68 BPM | HEIGHT: 66 IN

## 2020-03-19 DIAGNOSIS — E78.5 HYPERLIPIDEMIA, UNSPECIFIED HYPERLIPIDEMIA TYPE: ICD-10-CM

## 2020-03-19 DIAGNOSIS — K21.9 GASTROESOPHAGEAL REFLUX DISEASE, ESOPHAGITIS PRESENCE NOT SPECIFIED: ICD-10-CM

## 2020-03-19 DIAGNOSIS — I10 HYPERTENSION, UNSPECIFIED TYPE: ICD-10-CM

## 2020-03-19 DIAGNOSIS — M43.16 SPONDYLOLISTHESIS OF LUMBAR REGION: Primary | ICD-10-CM

## 2020-03-19 PROCEDURE — 99214 PR OFFICE/OUTPT VISIT, EST, LEVL IV, 30-39 MIN: ICD-10-PCS | Mod: S$GLB,,, | Performed by: NURSE PRACTITIONER

## 2020-03-19 PROCEDURE — 99214 OFFICE O/P EST MOD 30 MIN: CPT | Mod: S$GLB,,, | Performed by: NURSE PRACTITIONER

## 2020-03-19 RX ORDER — ATORVASTATIN CALCIUM 20 MG/1
20 TABLET, FILM COATED ORAL DAILY
Qty: 90 TABLET | Refills: 1 | Status: SHIPPED | OUTPATIENT
Start: 2020-03-19 | End: 2020-09-16 | Stop reason: SDUPTHER

## 2020-03-19 NOTE — PROGRESS NOTES
SUBJECTIVE:    Patient ID: Sandrita Kaiser is a 64 y.o. female.    Chief Complaint: Medication Refill (brought bottles// SW)    64 year old female presents for check up. Overall doing ok. Currently doing pt at home. Had lumbar fusion last year with DR. Miller. Was doing outpatient but in lieu of corona virus was temporarily discharged to continue at home. Getting stronger daily. Appetite is good. Due for labs this year. Sleeps well.       Admission on 11/19/2019, Discharged on 11/21/2019   Component Date Value Ref Range Status    POCT Glucose 11/19/2019 145* 70 - 110 mg/dL Final    WBC 11/20/2019 11.11  3.90 - 12.70 K/uL Final    RBC 11/20/2019 3.77* 4.00 - 5.40 M/uL Final    Hemoglobin 11/20/2019 10.5* 12.0 - 16.0 g/dL Final    Hematocrit 11/20/2019 33.1* 37.0 - 48.5 % Final    Mean Corpuscular Volume 11/20/2019 88  82 - 98 fL Final    Mean Corpuscular Hemoglobin 11/20/2019 27.9  27.0 - 31.0 pg Final    Mean Corpuscular Hemoglobin Conc 11/20/2019 31.7* 32.0 - 36.0 g/dL Final    RDW 11/20/2019 15.3* 11.5 - 14.5 % Final    Platelets 11/20/2019 277  150 - 350 K/uL Final    MPV 11/20/2019 8.9* 9.2 - 12.9 fL Final    Gran # (ANC) 11/20/2019 9.1* 1.8 - 7.7 K/uL Final    Lymph # 11/20/2019 1.2  1.0 - 4.8 K/uL Final    Mono # 11/20/2019 0.8  0.3 - 1.0 K/uL Final    Eos # 11/20/2019 0.0  0.0 - 0.5 K/uL Final    Baso # 11/20/2019 0.00  0.00 - 0.20 K/uL Final    Gran% 11/20/2019 82.0* 38.0 - 73.0 % Final    Lymph% 11/20/2019 10.4* 18.0 - 48.0 % Final    Mono% 11/20/2019 7.6  4.0 - 15.0 % Final    Eosinophil% 11/20/2019 0.0  0.0 - 8.0 % Final    Basophil% 11/20/2019 0.0  0.0 - 1.9 % Final    Differential Method 11/20/2019 Automated   Final    Sodium 11/20/2019 140  136 - 145 mmol/L Final    Potassium 11/20/2019 4.8  3.5 - 5.1 mmol/L Final    Chloride 11/20/2019 104  95 - 110 mmol/L Final    CO2 11/20/2019 26  23 - 29 mmol/L Final    Glucose 11/20/2019 114* 70 - 110 mg/dL Final    BUN, Bld  11/20/2019 21  8 - 23 mg/dL Final    Creatinine 11/20/2019 1.1  0.5 - 1.4 mg/dL Final    Calcium 11/20/2019 9.2  8.7 - 10.5 mg/dL Final    Anion Gap 11/20/2019 10  8 - 16 mmol/L Final    eGFR if African American 11/20/2019 >60  >60 mL/min/1.73 m^2 Final    eGFR if non African American 11/20/2019 52* >60 mL/min/1.73 m^2 Final   Lab Visit on 11/13/2019   Component Date Value Ref Range Status    Group & Rh 11/13/2019 O NEG   Final    Indirect Claudia 11/13/2019 NEG   Final   Office Visit on 10/29/2019   Component Date Value Ref Range Status    WBC 10/29/2019 6.8  3.8 - 10.8 Thousand/uL Final    RBC 10/29/2019 4.70  3.80 - 5.10 Million/uL Final    Hemoglobin 10/29/2019 13.3  11.7 - 15.5 g/dL Final    Hematocrit 10/29/2019 39.3  35.0 - 45.0 % Final    Mean Corpuscular Volume 10/29/2019 83.6  80.0 - 100.0 fL Final    Mean Corpuscular Hemoglobin 10/29/2019 28.3  27.0 - 33.0 pg Final    Mean Corpuscular Hemoglobin Conc 10/29/2019 33.8  32.0 - 36.0 g/dL Final    RDW 10/29/2019 14.2  11.0 - 15.0 % Final    Platelets 10/29/2019 324  140 - 400 Thousand/uL Final    MPV 10/29/2019 9.6  7.5 - 12.5 fL Final    Neutrophils Absolute 10/29/2019 3,305  1,500 - 7,800 cells/uL Final    Lymph # 10/29/2019 2,734  850 - 3,900 cells/uL Final    Mono # 10/29/2019 530  200 - 950 cells/uL Final    Eos # 10/29/2019 190  15 - 500 cells/uL Final    Baso # 10/29/2019 41  0 - 200 cells/uL Final    Neutrophils Relative 10/29/2019 48.6  % Final    Lymph% 10/29/2019 40.2  % Final    Mono% 10/29/2019 7.8  % Final    Eosinophil% 10/29/2019 2.8  % Final    Basophil% 10/29/2019 0.6  % Final    Glucose 10/29/2019 95  65 - 99 mg/dL Final    BUN, Bld 10/29/2019 13  7 - 25 mg/dL Final    Creatinine 10/29/2019 0.96  0.50 - 0.99 mg/dL Final    eGFR if non African American 10/29/2019 62  > OR = 60 mL/min/1.73m2 Final    eGFR if  10/29/2019 71  > OR = 60 mL/min/1.73m2 Final    BUN/Creatinine Ratio 10/29/2019 NOT  APPLICABLE  6 - 22 (calc) Final    Sodium 10/29/2019 135  135 - 146 mmol/L Final    Potassium 10/29/2019 4.4  3.5 - 5.3 mmol/L Final    Chloride 10/29/2019 97* 98 - 110 mmol/L Final    CO2 10/29/2019 30  20 - 32 mmol/L Final    Calcium 10/29/2019 10.0  8.6 - 10.4 mg/dL Final    Total Protein 10/29/2019 7.3  6.1 - 8.1 g/dL Final    Albumin 10/29/2019 4.3  3.6 - 5.1 g/dL Final    Globulin, Total 10/29/2019 3.0  1.9 - 3.7 g/dL (calc) Final    Albumin/Globulin Ratio 10/29/2019 1.4  1.0 - 2.5 (calc) Final    Total Bilirubin 10/29/2019 0.5  0.2 - 1.2 mg/dL Final    Alkaline Phosphatase 10/29/2019 91  33 - 130 U/L Final    AST 10/29/2019 17  10 - 35 U/L Final    ALT 10/29/2019 24  6 - 29 U/L Final    INR 10/29/2019 0.9   Final    Prothrombin Time 10/29/2019 9.8  9.0 - 11.5 sec Final    aPTT 10/29/2019 30  22 - 34 sec Final    Color, UA 10/29/2019 YELLOW  YELLOW Final    Appearance, UA 10/29/2019 CLEAR  CLEAR Final    Specific [place], UA 10/29/2019 1.015  1.001 - 1.035 Final    pH, UA 10/29/2019 8.0  5.0 - 8.0 Final    Glucose, UA 10/29/2019 NEGATIVE  NEGATIVE Final    Bilirubin, UA 10/29/2019 NEGATIVE  NEGATIVE Final    Ketones, UA 10/29/2019 NEGATIVE  NEGATIVE Final    Occult Blood UA 10/29/2019 NEGATIVE  NEGATIVE Final    Protein, UA 10/29/2019 NEGATIVE  NEGATIVE Final    Nitrite, UA 10/29/2019 NEGATIVE  NEGATIVE Final    Leukocytes, UA 10/29/2019 NEGATIVE  NEGATIVE Final    WBC Casts, UA 10/29/2019 NONE SEEN  < OR = 5 /HPF Final    RBC Casts, UA 10/29/2019 NONE SEEN  < OR = 2 /HPF Final    Squam Epithel, UA 10/29/2019 0-5  < OR = 5 /HPF Final    Bacteria, UA 10/29/2019 NONE SEEN  NONE SEEN /HPF Final    Hyaline Casts, UA 10/29/2019 NONE SEEN  NONE SEEN /LPF Final    Reflexive Urine Culture 10/29/2019 NO CULTURE INDICATED   Final       Past Medical History:   Diagnosis Date    High cholesterol     Hypertension      Past Surgical History:   Procedure Laterality Date    BREAST BIOPSY       LUMBAR FUSION Left 11/19/2019    Procedure: FUSION, SPINE, LUMBAR Procedure: Stg 1: Left L4-5 oblique interbody fusion (Interbody spacer, allograft) / Stg 2:L4-5 Posterior instrumentation;  Surgeon: Ramos Miller MD;  Location: [facility];  Service: Neurosurgery;  Laterality: Left;  FUSION, SPINE, LUMBAR  Procedure: Stg 1: Left L4-5 oblique interbody fusion (Interbody spacer, allograft) / Stg 2:L4-5 Posterior instrumentation  Surg    TONSILLECTOMY      TUBAL LIGATION       History reviewed. No pertinent family history.    Marital Status:   Alcohol History:  reports that she does not drink alcohol.  Tobacco History:  reports that she has quit smoking. She quit smokeless tobacco use about 19 years ago.  Drug History:  reports that she does not use drugs.    Review of patient's allergies indicates:   Allergen Reactions    Lisinopril      Other reaction(s): BETTE    Adhesive Rash       Current Outpatient Medications:     atorvastatin (LIPITOR) 20 MG tablet, Take 1 tablet (20 mg total) by mouth once daily., Disp: 90 tablet, Rfl: 1    gabapentin (NEURONTIN) 300 MG capsule, Take 1 capsule (300 mg total) by mouth 3 (three) times daily., Disp: 90 capsule, Rfl: 6    losartan-hydrochlorothiazide 100-12.5 mg (HYZAAR) 100-12.5 mg Tab, Take 1 tablet by mouth once daily., Disp: 90 tablet, Rfl: 1    methocarbamol (ROBAXIN) 750 MG Tab, , Disp: , Rfl:     multivitamin (THERAGRAN) per tablet, Take 1 tablet by mouth once daily., Disp: , Rfl:     omeprazole (PRILOSEC) 20 MG capsule, Take 20 mg by mouth once daily., Disp: , Rfl:     traMADol (ULTRAM) 50 mg tablet, TAKE 1 TABLET(50 MG) BY MOUTH EVERY 8 HOURS AS NEEDED FOR PAIN, Disp: 90 tablet, Rfl: 0    vit A/vit C/vit E/zinc/copper (ICAPS AREDS ORAL), Take by mouth., Disp: , Rfl:     Review of Systems   Constitutional: Negative for chills, fever and unexpected weight change.   HENT: Negative for ear pain, rhinorrhea and sore throat.    Eyes: Negative for pain and  "visual disturbance.   Respiratory: Negative for cough and shortness of breath.    Cardiovascular: Negative for chest pain, palpitations and leg swelling.   Gastrointestinal: Negative for abdominal pain, diarrhea, nausea and vomiting.   Genitourinary: Negative for difficulty urinating, hematuria and vaginal bleeding.   Musculoskeletal: Positive for back pain. Negative for arthralgias.   Skin: Negative for rash.   Neurological: Negative for dizziness, weakness and headaches.   Psychiatric/Behavioral: Negative for agitation and sleep disturbance. The patient is not nervous/anxious.           Objective:      Vitals:    03/19/20 0812   BP: 114/64   Pulse: 68   Weight: 95.7 kg (211 lb)   Height: 5' 5.5" (1.664 m)     Body mass index is 34.58 kg/m².  Physical Exam   Constitutional: She is oriented to person, place, and time. She appears well-developed and well-nourished.   HENT:   Head: Normocephalic.   Right Ear: External ear normal.   Left Ear: External ear normal.   Mouth/Throat: Oropharynx is clear and moist.   Neck: Normal range of motion. Neck supple. No JVD present.   Cardiovascular: Normal rate and regular rhythm.   No murmur heard.  Pulmonary/Chest: Effort normal and breath sounds normal.   Abdominal: Soft. Bowel sounds are normal.   Musculoskeletal: Normal range of motion. She exhibits no edema or deformity.        Lumbar back: She exhibits tenderness. She exhibits normal range of motion.   Lymphadenopathy:     She has no cervical adenopathy.   Neurological: She is alert and oriented to person, place, and time. Gait normal.   Skin: Skin is warm, dry and intact. No rash noted.   Psychiatric: She has a normal mood and affect. Her speech is normal and behavior is normal.         Assessment:       1. Spondylolisthesis of lumbar region    2. Hyperlipidemia, unspecified hyperlipidemia type    3. Hypertension, unspecified type    4. Gastroesophageal reflux disease, esophagitis presence not specified         Plan:     "   Spondylolisthesis of lumbar region    Hyperlipidemia, unspecified hyperlipidemia type  Comments:  Stable.  Request refills  Orders:  -     atorvastatin (LIPITOR) 20 MG tablet; Take 1 tablet (20 mg total) by mouth once daily.  Dispense: 90 tablet; Refill: 1    Hypertension, unspecified type    Gastroesophageal reflux disease, esophagitis presence not specified      Follow up in about 3 months (around 6/19/2020) for medication management.

## 2020-05-11 ENCOUNTER — TELEPHONE (OUTPATIENT)
Dept: NEUROSURGERY | Facility: CLINIC | Age: 65
End: 2020-05-11

## 2020-05-11 DIAGNOSIS — Z98.1 STATUS POST LUMBAR SPINAL FUSION: Primary | ICD-10-CM

## 2020-05-11 RX ORDER — METHOCARBAMOL 750 MG/1
TABLET, FILM COATED ORAL
Qty: 90 TABLET | Refills: 11 | Status: SHIPPED | OUTPATIENT
Start: 2020-05-11 | End: 2020-05-12 | Stop reason: SDUPTHER

## 2020-05-11 NOTE — TELEPHONE ENCOUNTER
----- Message from Ramos Miller MD sent at 5/11/2020  1:51 PM CDT -----  Contact: Sandrita  New PT order placed in epic.  Please fax.  ----- Message -----  From: Monica Adam  Sent: 5/11/2020  12:00 PM CDT  To: [facility] Staff    Sandrita is requesting  send new orders for physical therapy. Please fax orders to [phone]. Her physical therapy was interrupted by COVID. Please call [phone] to discuss.

## 2020-05-12 ENCOUNTER — TELEPHONE (OUTPATIENT)
Dept: NEUROSURGERY | Facility: CLINIC | Age: 65
End: 2020-05-12

## 2020-05-12 RX ORDER — METHOCARBAMOL 750 MG/1
TABLET, FILM COATED ORAL
Qty: 90 TABLET | Refills: 11 | Status: SHIPPED | OUTPATIENT
Start: 2020-05-12 | End: 2020-05-14

## 2020-05-12 NOTE — TELEPHONE ENCOUNTER
Per incoming fax. Plan does not cover Methocarbamol 750mg. Alternative medications include : Tizanidine tab; Ibuprofen; Naproxen.       Please advise - Thanks!

## 2020-05-12 NOTE — TELEPHONE ENCOUNTER
----- Message from Arvind Valdez sent at 5/12/2020  9:54 AM CDT -----  Contact: [phone] / self   Patient would like a refill for the medication methocarbamoL (ROBAXIN) 750 MG Tab, Pharmacy is [facility] [place], [place]. Please Advise.

## 2020-05-14 RX ORDER — TIZANIDINE 4 MG/1
4 TABLET ORAL EVERY 6 HOURS PRN
Qty: 60 TABLET | Refills: 11 | Status: SHIPPED | OUTPATIENT
Start: 2020-05-14 | End: 2020-05-24

## 2020-06-02 RX ORDER — LOSARTAN POTASSIUM AND HYDROCHLOROTHIAZIDE 12.5; 1 MG/1; MG/1
1 TABLET ORAL DAILY
Qty: 90 TABLET | Refills: 1 | Status: SHIPPED | OUTPATIENT
Start: 2020-06-02 | End: 2020-07-15 | Stop reason: SDUPTHER

## 2020-06-02 NOTE — TELEPHONE ENCOUNTER
----- Message from Paresh Angel sent at 6/2/2020  9:45 AM CDT -----  Contact: Sandrita Kaiser  Needs a refill on Losartan-hctz 100-12.5 mg   [facility] to [facility] [address]  Pt# [phone]

## 2020-06-08 RX ORDER — HYDROCHLOROTHIAZIDE 12.5 MG/1
12.5 TABLET ORAL DAILY
Qty: 30 TABLET | Refills: 2 | Status: SHIPPED | OUTPATIENT
Start: 2020-06-08 | End: 2020-07-15

## 2020-06-08 RX ORDER — LOSARTAN POTASSIUM 100 MG/1
100 TABLET ORAL DAILY
Qty: 30 TABLET | Refills: 2 | Status: SHIPPED | OUTPATIENT
Start: 2020-06-08 | End: 2020-09-16

## 2020-06-08 NOTE — TELEPHONE ENCOUNTER
----- Message from Melanie Benedict MA sent at 6/8/2020  8:53 AM CDT -----  Pt called in to advise that her combo medication is on backorder.  She is requesting each drug be called in separately.    Losartan/HCTZ 100-12.5 mg    Pharmacy - [facility] on [address]    Pt - [phone]

## 2020-06-10 ENCOUNTER — HOSPITAL ENCOUNTER (OUTPATIENT)
Dept: RADIOLOGY | Facility: HOSPITAL | Age: 65
Discharge: HOME | End: 2020-06-10
Attending: PHYSICIAN ASSISTANT
Payer: MEDICARE

## 2020-06-10 ENCOUNTER — OFFICE VISIT (OUTPATIENT)
Dept: NEUROSURGERY | Facility: CLINIC | Age: 65
End: 2020-06-10
Payer: MEDICARE

## 2020-06-10 VITALS
SYSTOLIC BLOOD PRESSURE: 127 MMHG | BODY MASS INDEX: 33.91 KG/M2 | DIASTOLIC BLOOD PRESSURE: 78 MMHG | WEIGHT: 211 LBS | HEIGHT: 66 IN | HEART RATE: 60 BPM

## 2020-06-10 DIAGNOSIS — M43.16 SPONDYLOLISTHESIS OF LUMBAR REGION: ICD-10-CM

## 2020-06-10 DIAGNOSIS — Z98.1 STATUS POST LUMBAR SPINAL FUSION: Primary | ICD-10-CM

## 2020-06-10 PROCEDURE — 99212 PR OFFICE/OUTPT VISIT, EST, LEVL II, 10-19 MIN: ICD-10-PCS | Mod: S$PBB,,, | Performed by: NEUROLOGICAL SURGERY

## 2020-06-10 PROCEDURE — 99213 OFFICE O/P EST LOW 20 MIN: CPT | Mod: PBBFAC,25,PN | Performed by: NEUROLOGICAL SURGERY

## 2020-06-10 PROCEDURE — 99999 PR PBB SHADOW E&M-EST. PATIENT-LVL III: CPT | Mod: PBBFAC,,, | Performed by: NEUROLOGICAL SURGERY

## 2020-06-10 PROCEDURE — 72100 X-RAY EXAM L-S SPINE 2/3 VWS: CPT | Mod: TC,PN

## 2020-06-10 PROCEDURE — 99999 PR PBB SHADOW E&M-EST. PATIENT-LVL III: ICD-10-PCS | Mod: PBBFAC,,, | Performed by: NEUROLOGICAL SURGERY

## 2020-06-10 PROCEDURE — 99212 OFFICE O/P EST SF 10 MIN: CPT | Mod: S$PBB,,, | Performed by: NEUROLOGICAL SURGERY

## 2020-06-10 PROCEDURE — 72100 XR LUMBAR SPINE AP AND LATERAL: ICD-10-PCS | Mod: 26,,, | Performed by: RADIOLOGY

## 2020-06-10 PROCEDURE — 72100 X-RAY EXAM L-S SPINE 2/3 VWS: CPT | Mod: 26,,, | Performed by: RADIOLOGY

## 2020-06-10 NOTE — PROGRESS NOTES
NEUROSURGICAL PROGRESS NOTE    DATE OF SERVICE:  06/10/2020    ATTENDING PHYSICIAN:  Ramos Miller MD    SUBJECTIVE:    INTERIM HISTORY:    This is a very pleasant 64 y.o. female, who is status post L4-5 oblique interbody fusion with posterior instrumentation about 6 months ago for spondylolisthesis with lateral recess stenosis.  She reports that since she had her surgery her back pain proximal leg pain and has significantly improved.  She reports that she still has occasionally distal lower extremity pain more on the left side.  She takes gabapentin that helps with the leg pain.  No new onset of weakness, numbness or sphincter dysfunction.  She has been doing physical therapy but due to the COVID pandemic she had to stop.  She would like to resume using a treadmill and reclined bike.  He has not tried the use a TENS unit.     Low Back Pain Scale  R Low Back-Pain Score: 1  R Low Back-Pain Intensity: The pain is bad, but I manage without taking pain killers  Personal Care : I can look after myself normally, but it causes extra pain.  Lifting: Pain prevents me from lifting heavy weights, but I can manage light weights if they are conveniently positioned.  Walking: Pain prevents me walking more than 1 mile.  Sitting: Pain prevents me from sitting more than one hour.   Low Back-Standing: I have some pain on standing but it does not increase with time   Low Back-Sleeping: I have no pain in bed  Social Life: My social life is normal, and give me no extra pain.   Low Back-Traveling: I have extra pain while traveling but it does not compel me to seek alternate forms of travel   Low Back-Changing Degree of Pain: My pain fluctuates but is definitely getting better         PAST MEDICAL HISTORY:  Active Ambulatory Problems     Diagnosis Date Noted    Spondylolisthesis of lumbar region 11/19/2019    Hyperlipidemia 12/02/2019    Hypertension 03/19/2020     Resolved Ambulatory Problems     Diagnosis Date Noted    No Resolved  Ambulatory Problems     Past Medical History:   Diagnosis Date    High cholesterol        PAST SURGICAL HISTORY:  Past Surgical History:   Procedure Laterality Date    BREAST BIOPSY      LUMBAR FUSION Left 11/19/2019    Procedure: FUSION, SPINE, LUMBAR Procedure: Stg 1: Left L4-5 oblique interbody fusion (Interbody spacer, allograft) / Stg 2:L4-5 Posterior instrumentation;  Surgeon: Ramos Miller MD;  Location: [facility] OR;  Service: Neurosurgery;  Laterality: Left;  FUSION, SPINE, LUMBAR  Procedure: Stg 1: Left L4-5 oblique interbody fusion (Interbody spacer, allograft) / Stg 2:L4-5 Posterior instrumentation  Surg    TONSILLECTOMY      TUBAL LIGATION         SOCIAL HISTORY:   Social History     Socioeconomic History    Marital status:      Spouse name: Not on file    Number of children: Not on file    Years of education: Not on file    Highest education level: Not on file   Occupational History    Not on file   Social Needs    Financial resource strain: Not on file    Food insecurity:     Worry: Not on file     Inability: Not on file    Transportation needs:     Medical: Not on file     Non-medical: Not on file   Tobacco Use    Smoking status: Former Smoker    Smokeless tobacco: Former User     Quit date: 3/10/2001    Tobacco comment: 03/10/2001-   Substance and Sexual Activity    Alcohol use: Never     Frequency: Never    Drug use: Never    Sexual activity: Yes     Partners: Male   Lifestyle    Physical activity:     Days per week: Not on file     Minutes per session: Not on file    Stress: Not at all   Relationships    Social connections:     Talks on phone: Not on file     Gets together: Not on file     Attends Zoroastrianism service: Not on file     Active member of club or organization: Not on file     Attends meetings of clubs or organizations: Not on file     Relationship status: Not on file   Other Topics Concern    Not on file   Social History Narrative    Not on file       FAMILY  HISTORY:  No family history on file.    CURRENTS MEDICATIONS:  Current Outpatient Medications on File Prior to Visit   Medication Sig Dispense Refill    atorvastatin (LIPITOR) 20 MG tablet Take 1 tablet (20 mg total) by mouth once daily. 90 tablet 1    hydroCHLOROthiazide (HYDRODIURIL) 12.5 MG Tab Take 1 tablet (12.5 mg total) by mouth once daily. 30 tablet 2    losartan (COZAAR) 100 MG tablet Take 1 tablet (100 mg total) by mouth once daily. 30 tablet 2    multivitamin (THERAGRAN) per tablet Take 1 tablet by mouth once daily.      omeprazole (PRILOSEC) 20 MG capsule Take 20 mg by mouth once daily.      traMADol (ULTRAM) 50 mg tablet TAKE 1 TABLET(50 MG) BY MOUTH EVERY 8 HOURS AS NEEDED FOR PAIN 90 tablet 0    vit A/vit C/vit E/zinc/copper (ICAPS AREDS ORAL) Take by mouth.      gabapentin (NEURONTIN) 300 MG capsule Take 1 capsule (300 mg total) by mouth 3 (three) times daily. 90 capsule 6    losartan-hydrochlorothiazide 100-12.5 mg (HYZAAR) 100-12.5 mg Tab Take 1 tablet by mouth once daily. (Patient not taking: Reported on 6/10/2020) 90 tablet 1     No current facility-administered medications on file prior to visit.        ALLERGIES:  Review of patient's allergies indicates:   Allergen Reactions    Lisinopril      Other reaction(s): BETTE    Adhesive Rash       REVIEW OF SYSTEMS:  Review of Systems   Constitutional: Negative for diaphoresis, fever and weight loss.   Respiratory: Negative for shortness of breath.    Cardiovascular: Negative for chest pain.   Gastrointestinal: Negative for blood in stool.   Genitourinary: Negative for hematuria.   Endo/Heme/Allergies: Does not bruise/bleed easily.   All other systems reviewed and are negative.        OBJECTIVE:    PHYSICAL EXAMINATION:   Vitals:    06/10/20 1020   BP: 127/78   Pulse: 60       Physical Exam:  Vitals reviewed.    Constitutional: She appears well-developed and well-nourished.     Eyes: Pupils are equal, round, and reactive to light.  Conjunctivae and EOM are normal.     Cardiovascular: Normal distal pulses and no edema.     Abdominal: Soft.     Skin: Skin displays no rash on trunk and no rash on extremities. Skin displays no lesions on trunk and no lesions on extremities.     Psych/Behavior: She is alert. She is oriented to person, place, and time. She has a normal mood and affect.     Musculoskeletal:        Neck: Range of motion is full.     Neurological:        DTRs: Tricep reflexes are 2+ on the right side and 2+ on the left side. Bicep reflexes are 2+ on the right side and 2+ on the left side. Brachioradialis reflexes are 2+ on the right side and 2+ on the left side. Patellar reflexes are 2+ on the right side and 2+ on the left side. Achilles reflexes are 2+ on the right side and 2+ on the left side.       Back Exam     Tenderness   The patient is experiencing no tenderness.     Range of Motion   Extension: normal   Flexion: normal   Lateral bend right: normal   Lateral bend left: normal   Rotation right: normal   Rotation left: normal     Muscle Strength   Right Quadriceps:  5/5   Left Quadriceps:  5/5   Right Hamstrings:  5/5   Left Hamstrings:  5/5     Tests   Straight leg raise right: negative  Straight leg raise left: negative    Other   Toe walk: normal  Heel walk: normal                Neurologic Exam     Mental Status   Oriented to person, place, and time.   Speech: speech is normal   Level of consciousness: alert    Cranial Nerves   Cranial nerves II through XII intact.     CN III, IV, VI   Pupils are equal, round, and reactive to light.  Extraocular motions are normal.     Motor Exam   Muscle bulk: normal  Overall muscle tone: normal    Strength   Right deltoid: 5/5  Left deltoid: 5/5  Right biceps: 5/5  Left biceps: 5/5  Right triceps: 5/5  Left triceps: 5/5  Right wrist flexion: 5/5  Left wrist flexion: 5/5  Right wrist extension: 5/5  Left wrist extension: 5/5  Right interossei: 5/5  Left interossei: 5/5  Right iliopsoas:  5/5  Left iliopsoas: 5/5  Right quadriceps: 5/5  Left quadriceps: 5/5  Right hamstrin/5  Left hamstrin/5  Right anterior tibial: 5/5  Left anterior tibial: 5/5  Right posterior tibial: 5/5  Left posterior tibial: 5/5  Right peroneal: 5/5  Left peroneal: 5/5  Right gastroc: 5/5  Left gastroc: 5/5    Sensory Exam   Light touch normal.   Pinprick normal.     Gait, Coordination, and Reflexes     Gait  Gait: normal    Coordination   Finger to nose coordination: normal  Tandem walking coordination: normal    Reflexes   Right brachioradialis: 2+  Left brachioradialis: 2+  Right biceps: 2+  Left biceps: 2+  Right triceps: 2+  Left triceps: 2+  Right patellar: 2+  Left patellar: 2+  Right achilles: 2+  Left achilles: 2+  Right plantar: normal  Left plantar: normal  Right Overton: absent  Left Overton: absent  Right ankle clonus: absent  Left ankle clonus: absent        DIAGNOSTIC DATA:  I personally interpreted the following imaging:   Lumbar x-ray today shows consolidation of the L4-5 fusion    ASSESMENT:  This is a 64 y.o. female with     Problem List Items Addressed This Visit     None      Visit Diagnoses     Status post lumbar spinal fusion    -  Primary            PLAN:  Recommending TENS unit for chronic left leg pain  Okay to resume more physical activities.  All questions answered  Will follow-up as needed with Neurosurgery            Ramos Miller MD  Cell:[phone]

## 2020-07-14 ENCOUNTER — TELEPHONE (OUTPATIENT)
Dept: FAMILY MEDICINE | Facility: CLINIC | Age: 65
End: 2020-07-14

## 2020-07-14 NOTE — TELEPHONE ENCOUNTER
----- Message from Roz Mccann sent at 7/14/2020 12:28 PM CDT -----  Regarding: needing call back  PT is thinking she is needing her diuretic  increased she is getting swollen and she doesn't eat salty things   Pt [phone]

## 2020-07-15 ENCOUNTER — OFFICE VISIT (OUTPATIENT)
Dept: FAMILY MEDICINE | Facility: CLINIC | Age: 65
End: 2020-07-15
Payer: MEDICARE

## 2020-07-15 VITALS
SYSTOLIC BLOOD PRESSURE: 122 MMHG | WEIGHT: 204 LBS | TEMPERATURE: 97 F | DIASTOLIC BLOOD PRESSURE: 70 MMHG | BODY MASS INDEX: 38.51 KG/M2 | HEART RATE: 70 BPM | HEIGHT: 61 IN

## 2020-07-15 DIAGNOSIS — I10 HYPERTENSION, UNSPECIFIED TYPE: Primary | ICD-10-CM

## 2020-07-15 DIAGNOSIS — M54.16 LUMBAR RADICULOPATHY: ICD-10-CM

## 2020-07-15 DIAGNOSIS — K21.9 GASTROESOPHAGEAL REFLUX DISEASE, ESOPHAGITIS PRESENCE NOT SPECIFIED: ICD-10-CM

## 2020-07-15 DIAGNOSIS — E78.5 HYPERLIPIDEMIA, UNSPECIFIED HYPERLIPIDEMIA TYPE: ICD-10-CM

## 2020-07-15 DIAGNOSIS — R60.9 EDEMA, UNSPECIFIED TYPE: ICD-10-CM

## 2020-07-15 PROCEDURE — 99214 PR OFFICE/OUTPT VISIT, EST, LEVL IV, 30-39 MIN: ICD-10-PCS | Mod: S$GLB,,, | Performed by: NURSE PRACTITIONER

## 2020-07-15 PROCEDURE — 99214 OFFICE O/P EST MOD 30 MIN: CPT | Mod: S$GLB,,, | Performed by: NURSE PRACTITIONER

## 2020-07-15 RX ORDER — LOSARTAN POTASSIUM AND HYDROCHLOROTHIAZIDE 12.5; 1 MG/1; MG/1
1 TABLET ORAL DAILY
Qty: 90 TABLET | Refills: 1 | Status: SHIPPED | OUTPATIENT
Start: 2020-07-15 | End: 2021-01-14 | Stop reason: SDUPTHER

## 2020-07-15 RX ORDER — HYDROCHLOROTHIAZIDE 12.5 MG/1
12.5 TABLET ORAL DAILY
Qty: 30 TABLET | Refills: 11 | Status: SHIPPED | OUTPATIENT
Start: 2020-07-15 | End: 2021-01-14 | Stop reason: SDUPTHER

## 2020-07-15 NOTE — PROGRESS NOTES
SUBJECTIVE:    Patient ID: Sandrita Kaiser is a 64 y.o. female.    Chief Complaint: Foot Swelling (both//mostly left foot//brought bottles tb )    64 year old female presents for check up. Overall doing well. Has history of hypertension and hyperlipidemia and lumbar surgery. Stays busy around house. Sewing masks to donate to needy. Has been noticing swelling to lower extremities. No shortness of breath. No chest pain.  HAS HAD ISSUES WITH SWELLING IN THE PAST.  SEEMS TO RESOLVE BY MORNING.  SLEEPS WELL.  HAD LUMBAR FUSION LAST YEAR.  RECOVERING WELL      No visits with results within 6 Month(s) from this visit.   Latest known visit with results is:   Admission on 11/19/2019, Discharged on 11/21/2019   Component Date Value Ref Range Status    POCT Glucose 11/19/2019 145* 70 - 110 mg/dL Final    WBC 11/20/2019 11.11  3.90 - 12.70 K/uL Final    RBC 11/20/2019 3.77* 4.00 - 5.40 M/uL Final    Hemoglobin 11/20/2019 10.5* 12.0 - 16.0 g/dL Final    Hematocrit 11/20/2019 33.1* 37.0 - 48.5 % Final    Mean Corpuscular Volume 11/20/2019 88  82 - 98 fL Final    Mean Corpuscular Hemoglobin 11/20/2019 27.9  27.0 - 31.0 pg Final    Mean Corpuscular Hemoglobin Conc 11/20/2019 31.7* 32.0 - 36.0 g/dL Final    RDW 11/20/2019 15.3* 11.5 - 14.5 % Final    Platelets 11/20/2019 277  150 - 350 K/uL Final    MPV 11/20/2019 8.9* 9.2 - 12.9 fL Final    Gran # (ANC) 11/20/2019 9.1* 1.8 - 7.7 K/uL Final    Lymph # 11/20/2019 1.2  1.0 - 4.8 K/uL Final    Mono # 11/20/2019 0.8  0.3 - 1.0 K/uL Final    Eos # 11/20/2019 0.0  0.0 - 0.5 K/uL Final    Baso # 11/20/2019 0.00  0.00 - 0.20 K/uL Final    Gran% 11/20/2019 82.0* 38.0 - 73.0 % Final    Lymph% 11/20/2019 10.4* 18.0 - 48.0 % Final    Mono% 11/20/2019 7.6  4.0 - 15.0 % Final    Eosinophil% 11/20/2019 0.0  0.0 - 8.0 % Final    Basophil% 11/20/2019 0.0  0.0 - 1.9 % Final    Differential Method 11/20/2019 Automated   Final    Sodium 11/20/2019 140  136 - 145 mmol/L Final     Potassium 11/20/2019 4.8  3.5 - 5.1 mmol/L Final    Chloride 11/20/2019 104  95 - 110 mmol/L Final    CO2 11/20/2019 26  23 - 29 mmol/L Final    Glucose 11/20/2019 114* 70 - 110 mg/dL Final    BUN, Bld 11/20/2019 21  8 - 23 mg/dL Final    Creatinine 11/20/2019 1.1  0.5 - 1.4 mg/dL Final    Calcium 11/20/2019 9.2  8.7 - 10.5 mg/dL Final    Anion Gap 11/20/2019 10  8 - 16 mmol/L Final    eGFR if African American 11/20/2019 >60  >60 mL/min/1.73 m^2 Final    eGFR if non African American 11/20/2019 52* >60 mL/min/1.73 m^2 Final       Past Medical History:   Diagnosis Date    High cholesterol     Hypertension      Past Surgical History:   Procedure Laterality Date    BREAST BIOPSY      LUMBAR FUSION Left 11/19/2019    Procedure: FUSION, SPINE, LUMBAR Procedure: Stg 1: Left L4-5 oblique interbody fusion (Interbody spacer, allograft) / Stg 2:L4-5 Posterior instrumentation;  Surgeon: Ramos Miller MD;  Location: [facility];  Service: Neurosurgery;  Laterality: Left;  FUSION, SPINE, LUMBAR  Procedure: Stg 1: Left L4-5 oblique interbody fusion (Interbody spacer, allograft) / Stg 2:L4-5 Posterior instrumentation  Surg    TONSILLECTOMY      TUBAL LIGATION       History reviewed. No pertinent family history.    Marital Status:   Alcohol History:  reports no history of alcohol use.  Tobacco History:  reports that she has quit smoking. She quit smokeless tobacco use about 19 years ago.  Drug History:  reports no history of drug use.    Review of patient's allergies indicates:   Allergen Reactions    Lisinopril      Other reaction(s): BETTE    Adhesive Rash       Current Outpatient Medications:     atorvastatin (LIPITOR) 20 MG tablet, Take 1 tablet (20 mg total) by mouth once daily., Disp: 90 tablet, Rfl: 1    gabapentin (NEURONTIN) 300 MG capsule, Take 1 capsule (300 mg total) by mouth 3 (three) times daily. (Patient taking differently: Take 300 mg by mouth 3 (three) times daily. Pt taking bid), Disp: 90  "capsule, Rfl: 6    losartan-hydrochlorothiazide 100-12.5 mg (HYZAAR) 100-12.5 mg Tab, Take 1 tablet by mouth once daily., Disp: 90 tablet, Rfl: 1    multivitamin (THERAGRAN) per tablet, Take 1 tablet by mouth once daily., Disp: , Rfl:     omeprazole (PRILOSEC) 20 MG capsule, Take 20 mg by mouth once daily., Disp: , Rfl:     vit A/vit C/vit E/zinc/copper (ICAPS AREDS ORAL), Take by mouth., Disp: , Rfl:     hydroCHLOROthiazide (HYDRODIURIL) 12.5 MG Tab, Take 1 tablet (12.5 mg total) by mouth once daily., Disp: 30 tablet, Rfl: 11    losartan (COZAAR) 100 MG tablet, Take 1 tablet (100 mg total) by mouth once daily., Disp: 30 tablet, Rfl: 2    Review of Systems   Constitutional: Negative for chills, fever and unexpected weight change.   HENT: Negative for ear pain, rhinorrhea and sore throat.    Eyes: Negative for pain and visual disturbance.   Respiratory: Negative for cough and shortness of breath.    Cardiovascular: Positive for leg swelling. Negative for chest pain and palpitations.   Gastrointestinal: Negative for abdominal pain, diarrhea, nausea and vomiting.   Genitourinary: Negative for difficulty urinating, hematuria and vaginal bleeding.   Musculoskeletal: Negative for arthralgias.   Skin: Negative for rash.   Neurological: Negative for dizziness, weakness and headaches.   Psychiatric/Behavioral: Negative for agitation and sleep disturbance. The patient is not nervous/anxious.           Objective:      Vitals:    07/15/20 1309   BP: 122/70   Pulse: 70   Temp: 97.2 °F (36.2 °C)   Weight: 92.5 kg (204 lb)   Height: 5' 0.5" (1.537 m)     Body mass index is 39.19 kg/m².  Physical Exam  Constitutional:       Appearance: She is well-developed.   Neck:      Musculoskeletal: Normal range of motion and neck supple.      Vascular: No JVD.   Cardiovascular:      Rate and Rhythm: Normal rate and regular rhythm.      Heart sounds: No murmur.   Pulmonary:      Effort: Pulmonary effort is normal.      Breath sounds: " Normal breath sounds.   Abdominal:      General: Bowel sounds are normal.      Palpations: Abdomen is soft.   Musculoskeletal: Normal range of motion.         General: No deformity.      Right lower leg: Edema present.      Left lower leg: Edema present.   Lymphadenopathy:      Cervical: No cervical adenopathy.   Skin:     General: Skin is warm and dry.      Findings: No rash.   Neurological:      Mental Status: She is alert and oriented to person, place, and time.      Gait: Gait normal.   Psychiatric:         Speech: Speech normal.         Behavior: Behavior normal.           Assessment:       1. Hypertension, unspecified type    2. Hyperlipidemia, unspecified hyperlipidemia type    3. Gastroesophageal reflux disease, esophagitis presence not specified    4. Lumbar radiculopathy    5. Edema, unspecified type         Plan:       Hypertension, unspecified type  -     losartan-hydrochlorothiazide 100-12.5 mg (HYZAAR) 100-12.5 mg Tab; Take 1 tablet by mouth once daily.  Dispense: 90 tablet; Refill: 1    Hyperlipidemia, unspecified hyperlipidemia type    Gastroesophageal reflux disease, esophagitis presence not specified    Lumbar radiculopathy    Edema, unspecified type  Comments:  ADD 12.5 MG AS NEEDED.  ELEVATE LOWER EXTREMITIES.  COMPRESSION SOCKS.    Other orders  -     hydroCHLOROthiazide (HYDRODIURIL) 12.5 MG Tab; Take 1 tablet (12.5 mg total) by mouth once daily.  Dispense: 30 tablet; Refill: 11      Follow up in about 3 months (around 10/15/2020) for medication management.

## 2020-08-25 ENCOUNTER — TELEPHONE (OUTPATIENT)
Dept: FAMILY MEDICINE | Facility: CLINIC | Age: 65
End: 2020-08-25

## 2020-08-25 DIAGNOSIS — F32.A DEPRESSION, UNSPECIFIED DEPRESSION TYPE: ICD-10-CM

## 2020-08-25 DIAGNOSIS — K21.9 GASTROESOPHAGEAL REFLUX DISEASE, ESOPHAGITIS PRESENCE NOT SPECIFIED: ICD-10-CM

## 2020-08-25 DIAGNOSIS — F41.9 ANXIETY: Primary | ICD-10-CM

## 2020-08-25 DIAGNOSIS — E78.5 HYPERLIPIDEMIA, UNSPECIFIED HYPERLIPIDEMIA TYPE: ICD-10-CM

## 2020-08-25 DIAGNOSIS — E03.9 HYPOTHYROIDISM, UNSPECIFIED TYPE: ICD-10-CM

## 2020-08-25 DIAGNOSIS — Z79.899 HIGH RISK MEDICATION USE: ICD-10-CM

## 2020-08-25 NOTE — TELEPHONE ENCOUNTER
Patient wants to know if she needs labs before her appt in a few weeks? Has not had any since last year.

## 2020-08-27 ENCOUNTER — TELEPHONE (OUTPATIENT)
Dept: FAMILY MEDICINE | Facility: CLINIC | Age: 65
End: 2020-08-27

## 2020-08-27 DIAGNOSIS — Z01.818 PRE-OPERATIVE CLEARANCE: Primary | ICD-10-CM

## 2020-08-27 NOTE — TELEPHONE ENCOUNTER
Infectious Disease Patient came by to give us a copy of her [facility] bill.  She has spoken to Medicare about it and they told her that a diagnosis code is needed to allow them to pay for her PT/PTT ordered for her Pre-Op for surgery in November 2019. I called Billing and added M43.16 and [id number]. I do not know if this is what they are looking for, but it is all that I could find. I submitted that to [place] in the [facility] Billing Dept.  Bill # [id number], DOS:10/29/19.  Patient is aware that she should see another bill in 1-3 months after this processes/ba

## 2020-09-10 LAB
ALBUMIN SERPL-MCNC: 4.4 G/DL (ref 3.6–5.1)
ALBUMIN/GLOB SERPL: 1.6 (CALC) (ref 1–2.5)
ALP SERPL-CCNC: 81 U/L (ref 37–153)
ALT SERPL-CCNC: 18 U/L (ref 6–29)
AST SERPL-CCNC: 16 U/L (ref 10–35)
BASOPHILS # BLD AUTO: 37 CELLS/UL (ref 0–200)
BASOPHILS NFR BLD AUTO: 0.6 %
BILIRUB SERPL-MCNC: 0.6 MG/DL (ref 0.2–1.2)
BUN SERPL-MCNC: 12 MG/DL (ref 7–25)
BUN/CREAT SERPL: ABNORMAL (CALC) (ref 6–22)
CALCIUM SERPL-MCNC: 9.1 MG/DL (ref 8.6–10.4)
CHLORIDE SERPL-SCNC: 101 MMOL/L (ref 98–110)
CHOLEST SERPL-MCNC: 202 MG/DL
CHOLEST/HDLC SERPL: 3.5 (CALC)
CO2 SERPL-SCNC: 29 MMOL/L (ref 20–32)
CREAT SERPL-MCNC: 0.99 MG/DL (ref 0.5–0.99)
EOSINOPHIL # BLD AUTO: 143 CELLS/UL (ref 15–500)
EOSINOPHIL NFR BLD AUTO: 2.3 %
ERYTHROCYTE [DISTWIDTH] IN BLOOD BY AUTOMATED COUNT: 13.9 % (ref 11–15)
GFRSERPLBLD MDRD-ARVRAT: 59 ML/MIN/1.73M2
GLOBULIN SER CALC-MCNC: 2.7 G/DL (CALC) (ref 1.9–3.7)
GLUCOSE SERPL-MCNC: 86 MG/DL (ref 65–99)
HCT VFR BLD AUTO: 40.8 % (ref 35–45)
HDLC SERPL-MCNC: 58 MG/DL
HGB BLD-MCNC: 13.2 G/DL (ref 11.7–15.5)
LDLC SERPL CALC-MCNC: 121 MG/DL (CALC)
LYMPHOCYTES # BLD AUTO: 2536 CELLS/UL (ref 850–3900)
LYMPHOCYTES NFR BLD AUTO: 40.9 %
MCH RBC QN AUTO: 27.5 PG (ref 27–33)
MCHC RBC AUTO-ENTMCNC: 32.4 G/DL (ref 32–36)
MCV RBC AUTO: 85 FL (ref 80–100)
MONOCYTES # BLD AUTO: 552 CELLS/UL (ref 200–950)
MONOCYTES NFR BLD AUTO: 8.9 %
NEUTROPHILS # BLD AUTO: 2933 CELLS/UL (ref 1500–7800)
NEUTROPHILS NFR BLD AUTO: 47.3 %
NONHDLC SERPL-MCNC: 144 MG/DL (CALC)
PLATELET # BLD AUTO: 303 THOUSAND/UL (ref 140–400)
PMV BLD REES-ECKER: 9.4 FL (ref 7.5–12.5)
POTASSIUM SERPL-SCNC: 4.3 MMOL/L (ref 3.5–5.3)
PROT SERPL-MCNC: 7.1 G/DL (ref 6.1–8.1)
RBC # BLD AUTO: 4.8 MILLION/UL (ref 3.8–5.1)
SODIUM SERPL-SCNC: 138 MMOL/L (ref 135–146)
TRIGL SERPL-MCNC: 121 MG/DL
TSH SERPL-ACNC: 1.48 MIU/L (ref 0.4–4.5)
WBC # BLD AUTO: 6.2 THOUSAND/UL (ref 3.8–10.8)

## 2020-09-16 ENCOUNTER — OFFICE VISIT (OUTPATIENT)
Dept: FAMILY MEDICINE | Facility: CLINIC | Age: 65
End: 2020-09-16
Payer: MEDICARE

## 2020-09-16 VITALS
HEIGHT: 61 IN | WEIGHT: 206 LBS | BODY MASS INDEX: 38.89 KG/M2 | DIASTOLIC BLOOD PRESSURE: 82 MMHG | HEART RATE: 72 BPM | TEMPERATURE: 98 F | SYSTOLIC BLOOD PRESSURE: 140 MMHG

## 2020-09-16 DIAGNOSIS — F32.A DEPRESSION, UNSPECIFIED DEPRESSION TYPE: ICD-10-CM

## 2020-09-16 DIAGNOSIS — E78.5 HYPERLIPIDEMIA, UNSPECIFIED HYPERLIPIDEMIA TYPE: ICD-10-CM

## 2020-09-16 DIAGNOSIS — Z98.1 STATUS POST LUMBAR SPINAL FUSION: ICD-10-CM

## 2020-09-16 DIAGNOSIS — J11.1 INFLUENZA: Primary | ICD-10-CM

## 2020-09-16 DIAGNOSIS — E03.9 HYPOTHYROIDISM, UNSPECIFIED TYPE: ICD-10-CM

## 2020-09-16 DIAGNOSIS — I10 HYPERTENSION, UNSPECIFIED TYPE: ICD-10-CM

## 2020-09-16 DIAGNOSIS — Z79.899 HIGH RISK MEDICATION USE: ICD-10-CM

## 2020-09-16 DIAGNOSIS — K21.9 GASTROESOPHAGEAL REFLUX DISEASE, ESOPHAGITIS PRESENCE NOT SPECIFIED: ICD-10-CM

## 2020-09-16 PROCEDURE — 99214 OFFICE O/P EST MOD 30 MIN: CPT | Mod: 25,S$GLB,, | Performed by: NURSE PRACTITIONER

## 2020-09-16 PROCEDURE — 90662 IIV NO PRSV INCREASED AG IM: CPT | Mod: S$GLB,,, | Performed by: NURSE PRACTITIONER

## 2020-09-16 PROCEDURE — 90662 FLU VACCINE - QUADRIVALENT - HIGH DOSE (65+) PRESERVATIVE FREE IM: ICD-10-PCS | Mod: S$GLB,,, | Performed by: NURSE PRACTITIONER

## 2020-09-16 PROCEDURE — G0008 ADMIN INFLUENZA VIRUS VAC: HCPCS | Mod: S$GLB,,, | Performed by: NURSE PRACTITIONER

## 2020-09-16 PROCEDURE — 99214 PR OFFICE/OUTPT VISIT, EST, LEVL IV, 30-39 MIN: ICD-10-PCS | Mod: 25,S$GLB,, | Performed by: NURSE PRACTITIONER

## 2020-09-16 PROCEDURE — G0008 FLU VACCINE - QUADRIVALENT - HIGH DOSE (65+) PRESERVATIVE FREE IM: ICD-10-PCS | Mod: S$GLB,,, | Performed by: NURSE PRACTITIONER

## 2020-09-16 RX ORDER — TIZANIDINE 4 MG/1
TABLET ORAL
COMMUNITY
Start: 2020-09-08 | End: 2021-01-14 | Stop reason: SDUPTHER

## 2020-09-16 RX ORDER — ATORVASTATIN CALCIUM 20 MG/1
20 TABLET, FILM COATED ORAL DAILY
Qty: 90 TABLET | Refills: 1 | Status: SHIPPED | OUTPATIENT
Start: 2020-09-16 | End: 2021-02-05 | Stop reason: SDUPTHER

## 2020-09-17 LAB
ALBUMIN/CREAT UR: 3 MCG/MG CREAT
APPEARANCE UR: CLEAR
BACTERIA #/AREA URNS HPF: NORMAL /HPF
BACTERIA UR CULT: NORMAL
BILIRUB UR QL STRIP: NEGATIVE
COLOR UR: YELLOW
CREAT UR-MCNC: 63 MG/DL (ref 20–275)
GLUCOSE UR QL STRIP: NEGATIVE
HGB UR QL STRIP: NEGATIVE
HYALINE CASTS #/AREA URNS LPF: NORMAL /LPF
KETONES UR QL STRIP: NEGATIVE
LEUKOCYTE ESTERASE UR QL STRIP: NEGATIVE
MICROALBUMIN UR-MCNC: 0.2 MG/DL
NITRITE UR QL STRIP: NEGATIVE
PH UR STRIP: 7.5 [PH] (ref 5–8)
PROT UR QL STRIP: NEGATIVE
RBC #/AREA URNS HPF: NORMAL /HPF
SP GR UR STRIP: 1.01 (ref 1–1.03)
SQUAMOUS #/AREA URNS HPF: NORMAL /HPF
WBC #/AREA URNS HPF: NORMAL /HPF

## 2020-09-21 ENCOUNTER — TELEPHONE (OUTPATIENT)
Dept: FAMILY MEDICINE | Facility: CLINIC | Age: 65
End: 2020-09-21

## 2020-09-21 NOTE — PROGRESS NOTES
SUBJECTIVE:    Patient ID: Sandrita Kaiser is a 65 y.o. female.    Chief Complaint: Follow-up ((brought bottles))    65 year old female presents for check up. Overall doing ok. Treated for hypertension, hyperlipidemia. Recently had spinal fusion. Was doing well in pt. Pt services were suspended due to covid. Since stopping patient does feels like back is 'stiffening' would like to discuss options. not as active. Needs refills. Does not check up at home. Takes care of  with dementia. sleeps well. Would like to discuss recent labs. Plans to submit urine today.       Telephone on 08/25/2020   Component Date Value Ref Range Status    WBC 09/09/2020 6.2  3.8 - 10.8 Thousand/uL Final    RBC 09/09/2020 4.80  3.80 - 5.10 Million/uL Final    Hemoglobin 09/09/2020 13.2  11.7 - 15.5 g/dL Final    Hematocrit 09/09/2020 40.8  35.0 - 45.0 % Final    Mean Corpuscular Volume 09/09/2020 85.0  80.0 - 100.0 fL Final    Mean Corpuscular Hemoglobin 09/09/2020 27.5  27.0 - 33.0 pg Final    Mean Corpuscular Hemoglobin Conc 09/09/2020 32.4  32.0 - 36.0 g/dL Final    RDW 09/09/2020 13.9  11.0 - 15.0 % Final    Platelets 09/09/2020 303  140 - 400 Thousand/uL Final    MPV 09/09/2020 9.4  7.5 - 12.5 fL Final    Neutrophils Absolute 09/09/2020 2,933  1,500 - 7,800 cells/uL Final    Lymph # 09/09/2020 2,536  850 - 3,900 cells/uL Final    Mono # 09/09/2020 552  200 - 950 cells/uL Final    Eos # 09/09/2020 143  15 - 500 cells/uL Final    Baso # 09/09/2020 37  0 - 200 cells/uL Final    Neutrophils Relative 09/09/2020 47.3  % Final    Lymph% 09/09/2020 40.9  % Final    Mono% 09/09/2020 8.9  % Final    Eosinophil% 09/09/2020 2.3  % Final    Basophil% 09/09/2020 0.6  % Final    Glucose 09/09/2020 86  65 - 99 mg/dL Final    BUN, Bld 09/09/2020 12  7 - 25 mg/dL Final    Creatinine 09/09/2020 0.99  0.50 - 0.99 mg/dL Final    eGFR if non African American 09/09/2020 59* > OR = 60 mL/min/1.73m2 Final    eGFR if African  American 09/09/2020 69  > OR = 60 mL/min/1.73m2 Final    BUN/Creatinine Ratio 09/09/2020 NOT APPLICABLE  6 - 22 (calc) Final    Sodium 09/09/2020 138  135 - 146 mmol/L Final    Potassium 09/09/2020 4.3  3.5 - 5.3 mmol/L Final    Chloride 09/09/2020 101  98 - 110 mmol/L Final    CO2 09/09/2020 29  20 - 32 mmol/L Final    Calcium 09/09/2020 9.1  8.6 - 10.4 mg/dL Final    Total Protein 09/09/2020 7.1  6.1 - 8.1 g/dL Final    Albumin 09/09/2020 4.4  3.6 - 5.1 g/dL Final    Globulin, Total 09/09/2020 2.7  1.9 - 3.7 g/dL (calc) Final    Albumin/Globulin Ratio 09/09/2020 1.6  1.0 - 2.5 (calc) Final    Total Bilirubin 09/09/2020 0.6  0.2 - 1.2 mg/dL Final    Alkaline Phosphatase 09/09/2020 81  37 - 153 U/L Final    AST 09/09/2020 16  10 - 35 U/L Final    ALT 09/09/2020 18  6 - 29 U/L Final    Cholesterol 09/09/2020 202* <200 mg/dL Final    HDL 09/09/2020 58  > OR = 50 mg/dL Final    Triglycerides 09/09/2020 121  <150 mg/dL Final    LDL Cholesterol 09/09/2020 121* mg/dL (calc) Final    Hdl/Cholesterol Ratio 09/09/2020 3.5  <5.0 (calc) Final    Non HDL Chol. (LDL+VLDL) 09/09/2020 144* <130 mg/dL (calc) Final    TSH w/reflex to FT4 09/09/2020 1.48  0.40 - 4.50 mIU/L Final    Creatinine, Random Ur 09/16/2020 63  20 - 275 mg/dL Final    Microalb, Ur 09/16/2020 0.2  See Note: mg/dL Final    Microalb Creat Ratio 09/16/2020 3  <30 mcg/mg creat Final    Color, UA 09/16/2020 YELLOW  YELLOW Final    Appearance, UA 09/16/2020 CLEAR  CLEAR Final    Specific [place], UA 09/16/2020 1.012  1.001 - 1.035 Final    pH, UA 09/16/2020 7.5  5.0 - 8.0 Final    Glucose, UA 09/16/2020 NEGATIVE  NEGATIVE Final    Bilirubin, UA 09/16/2020 NEGATIVE  NEGATIVE Final    Ketones, UA 09/16/2020 NEGATIVE  NEGATIVE Final    Occult Blood UA 09/16/2020 NEGATIVE  NEGATIVE Final    Protein, UA 09/16/2020 NEGATIVE  NEGATIVE Final    Nitrite, UA 09/16/2020 NEGATIVE  NEGATIVE Final    Leukocytes, UA 09/16/2020 NEGATIVE  NEGATIVE  Final    WBC Casts, UA 09/16/2020 NONE SEEN  < OR = 5 /HPF Final    RBC Casts, UA 09/16/2020 NONE SEEN  < OR = 2 /HPF Final    Squam Epithel, UA 09/16/2020 NONE SEEN  < OR = 5 /HPF Final    Bacteria, UA 09/16/2020 NONE SEEN  NONE SEEN /HPF Final    Hyaline Casts, UA 09/16/2020 NONE SEEN  NONE SEEN /LPF Final    Reflexive Urine Culture 09/16/2020 NO CULTURE INDICATED   Final       Past Medical History:   Diagnosis Date    High cholesterol     Hypertension      Social History     Socioeconomic History    Marital status:      Spouse name: Not on file    Number of children: Not on file    Years of education: Not on file    Highest education level: Not on file   Occupational History    Not on file   Social Needs    Financial resource strain: Not on file    Food insecurity     Worry: Not on file     Inability: Not on file    Transportation needs     Medical: Not on file     Non-medical: Not on file   Tobacco Use    Smoking status: Former Smoker    Smokeless tobacco: Former User     Quit date: 3/10/2001    Tobacco comment: 03/10/2001-   Substance and Sexual Activity    Alcohol use: Never     Frequency: Never    Drug use: Never    Sexual activity: Yes     Partners: Male   Lifestyle    Physical activity     Days per week: Not on file     Minutes per session: Not on file    Stress: Not at all   Relationships    Social connections     Talks on phone: Not on file     Gets together: Not on file     Attends Anabaptism service: Not on file     Active member of club or organization: Not on file     Attends meetings of clubs or organizations: Not on file     Relationship status: Not on file   Other Topics Concern    Not on file   Social History Narrative    Not on file     Past Surgical History:   Procedure Laterality Date    BREAST BIOPSY      LUMBAR FUSION Left 11/19/2019    Procedure: FUSION, SPINE, LUMBAR Procedure: Stg 1: Left L4-5 oblique interbody fusion (Interbody spacer, allograft) / Stg  2:L4-5 Posterior instrumentation;  Surgeon: Ramos Miller MD;  Location: [facility];  Service: Neurosurgery;  Laterality: Left;  FUSION, SPINE, LUMBAR  Procedure: Stg 1: Left L4-5 oblique interbody fusion (Interbody spacer, allograft) / Stg 2:L4-5 Posterior instrumentation  Surg    TONSILLECTOMY      TUBAL LIGATION       History reviewed. No pertinent family history.    Review of patient's allergies indicates:   Allergen Reactions    Lisinopril      Other reaction(s): BETTE    Adhesive Rash       Current Outpatient Medications:     atorvastatin (LIPITOR) 20 MG tablet, Take 1 tablet (20 mg total) by mouth once daily., Disp: 90 tablet, Rfl: 1    gabapentin (NEURONTIN) 300 MG capsule, Take 1 capsule (300 mg total) by mouth 3 (three) times daily. (Patient taking differently: Take 300 mg by mouth 3 (three) times daily. Pt taking bid), Disp: 90 capsule, Rfl: 6    hydroCHLOROthiazide (HYDRODIURIL) 12.5 MG Tab, Take 1 tablet (12.5 mg total) by mouth once daily. (Patient taking differently: Take 12.5 mg by mouth daily as needed. ), Disp: 30 tablet, Rfl: 11    losartan-hydrochlorothiazide 100-12.5 mg (HYZAAR) 100-12.5 mg Tab, Take 1 tablet by mouth once daily., Disp: 90 tablet, Rfl: 1    multivitamin (THERAGRAN) per tablet, Take 1 tablet by mouth once daily., Disp: , Rfl:     tiZANidine (ZANAFLEX) 4 MG tablet, TK 1 T PO Q 6 H PRN, Disp: , Rfl:     vit A/vit C/vit E/zinc/copper (ICAPS AREDS ORAL), Take by mouth., Disp: , Rfl:     omeprazole (PRILOSEC) 20 MG capsule, Take 20 mg by mouth once daily., Disp: , Rfl:     Review of Systems   Constitutional: Negative for chills, fever and unexpected weight change.   HENT: Negative for ear pain, rhinorrhea and sore throat.    Eyes: Negative for pain and visual disturbance.   Respiratory: Negative for cough and shortness of breath.    Cardiovascular: Positive for leg swelling. Negative for chest pain and palpitations.   Gastrointestinal: Negative for abdominal pain, diarrhea,  "nausea and vomiting.   Genitourinary: Negative for difficulty urinating, hematuria and vaginal bleeding.   Musculoskeletal: Negative for arthralgias.   Skin: Negative for rash.   Neurological: Negative for dizziness, weakness and headaches.   Psychiatric/Behavioral: Negative for agitation and sleep disturbance. The patient is not nervous/anxious.           Objective:      Vitals:    09/16/20 1009   BP: (!) 140/82   Pulse: 72   Temp: 98.3 °F (36.8 °C)   Weight: 93.4 kg (206 lb)   Height: 5' 0.5" (1.537 m)     Physical Exam  Constitutional:       Appearance: She is well-developed.   HENT:      Head: Normocephalic.      Right Ear: External ear normal.      Left Ear: External ear normal.   Neck:      Musculoskeletal: Normal range of motion and neck supple.      Vascular: No JVD.   Cardiovascular:      Rate and Rhythm: Normal rate and regular rhythm.      Heart sounds: No murmur.   Pulmonary:      Effort: Pulmonary effort is normal.      Breath sounds: Normal breath sounds.   Abdominal:      General: Bowel sounds are normal.      Palpations: Abdomen is soft.   Musculoskeletal: Normal range of motion.         General: No deformity.      Lumbar back: She exhibits tenderness. She exhibits normal range of motion.   Lymphadenopathy:      Cervical: No cervical adenopathy.   Skin:     General: Skin is warm and dry.      Findings: No rash.   Neurological:      Mental Status: She is alert and oriented to person, place, and time.      Gait: Gait normal.   Psychiatric:         Speech: Speech normal.         Behavior: Behavior normal.           Assessment:       1. Influenza    2. Hyperlipidemia, unspecified hyperlipidemia type    3. Hypertension, unspecified type    4. Status post lumbar spinal fusion    5. Gastroesophageal reflux disease, esophagitis presence not specified    6. High risk medication use    7. Hypothyroidism, unspecified type    8. Depression, unspecified depression type         Plan:       Influenza  -     " Influenza - Quadrivalent - High Dose (65+) (PF) (IM)    Hyperlipidemia, unspecified hyperlipidemia type  Comments:  Stable.  Request refills  Orders:  -     atorvastatin (LIPITOR) 20 MG tablet; Take 1 tablet (20 mg total) by mouth once daily.  Dispense: 90 tablet; Refill: 1    Hypertension, unspecified type    Status post lumbar spinal fusion    Gastroesophageal reflux disease, esophagitis presence not specified    High risk medication use    Hypothyroidism, unspecified type    Depression, unspecified depression type      No follow-ups on file.        9/20/2020 Kianna Rodrigues

## 2020-09-21 NOTE — TELEPHONE ENCOUNTER
----- Message from Adolfo Hollingsworth MD sent at 9/19/2020  3:00 PM CDT -----  Notify patient.  Both her urine test looked very normal.

## 2020-12-10 ENCOUNTER — TELEPHONE (OUTPATIENT)
Dept: NEUROSURGERY | Facility: CLINIC | Age: 65
End: 2020-12-10

## 2020-12-10 NOTE — TELEPHONE ENCOUNTER
----- Message from Carla Romero sent at 12/10/2020 11:57 AM CST -----  Contact: self [phone]  Patient is calling to get her medication which is a cream refilled. Please call

## 2020-12-15 ENCOUNTER — TELEPHONE (OUTPATIENT)
Dept: NEUROSURGERY | Facility: CLINIC | Age: 65
End: 2020-12-15

## 2020-12-15 NOTE — TELEPHONE ENCOUNTER
----- Message from Mar Miguel sent at 12/15/2020 12:15 PM CST -----  Type:  Needs Medical Advice    Who Called: self  Reason:follow up on refill request   Would the patient rather a call back or a response via [facility]? call  Best Call Back Number: [phone]  Additional Information: none

## 2020-12-15 NOTE — TELEPHONE ENCOUNTER
[facility] to the [facility].

## 2021-01-05 ENCOUNTER — TELEPHONE (OUTPATIENT)
Dept: FAMILY MEDICINE | Facility: CLINIC | Age: 66
End: 2021-01-05

## 2021-01-14 DIAGNOSIS — I10 HYPERTENSION, UNSPECIFIED TYPE: ICD-10-CM

## 2021-01-14 RX ORDER — TIZANIDINE 4 MG/1
TABLET ORAL
Qty: 90 TABLET | Refills: 1 | Status: SHIPPED | OUTPATIENT
Start: 2021-01-14 | End: 2021-01-24 | Stop reason: SDUPTHER

## 2021-01-14 RX ORDER — LOSARTAN POTASSIUM AND HYDROCHLOROTHIAZIDE 12.5; 1 MG/1; MG/1
1 TABLET ORAL DAILY
Qty: 90 TABLET | Refills: 1 | Status: SHIPPED | OUTPATIENT
Start: 2021-01-14 | End: 2021-02-05 | Stop reason: SDUPTHER

## 2021-01-14 RX ORDER — HYDROCHLOROTHIAZIDE 12.5 MG/1
12.5 TABLET ORAL DAILY
Qty: 90 TABLET | Refills: 1 | Status: SHIPPED | OUTPATIENT
Start: 2021-01-14 | End: 2021-02-05 | Stop reason: SDUPTHER

## 2021-01-23 ENCOUNTER — PATIENT MESSAGE (OUTPATIENT)
Dept: NEUROSURGERY | Facility: CLINIC | Age: 66
End: 2021-01-23

## 2021-01-24 DIAGNOSIS — M43.16 SPONDYLOLISTHESIS OF LUMBAR REGION: ICD-10-CM

## 2021-01-24 RX ORDER — GABAPENTIN 300 MG/1
300 CAPSULE ORAL 3 TIMES DAILY
Qty: 90 CAPSULE | Refills: 11 | Status: SHIPPED | OUTPATIENT
Start: 2021-01-24 | End: 2021-02-05 | Stop reason: SDUPTHER

## 2021-01-24 RX ORDER — TIZANIDINE 4 MG/1
TABLET ORAL
Qty: 90 TABLET | Refills: 11 | Status: SHIPPED | OUTPATIENT
Start: 2021-01-24 | End: 2021-02-05 | Stop reason: SDUPTHER

## 2021-02-05 DIAGNOSIS — I10 HYPERTENSION, UNSPECIFIED TYPE: ICD-10-CM

## 2021-02-05 DIAGNOSIS — M43.16 SPONDYLOLISTHESIS OF LUMBAR REGION: ICD-10-CM

## 2021-02-05 DIAGNOSIS — E78.5 HYPERLIPIDEMIA, UNSPECIFIED HYPERLIPIDEMIA TYPE: ICD-10-CM

## 2021-02-05 RX ORDER — OMEPRAZOLE 20 MG/1
20 CAPSULE, DELAYED RELEASE ORAL DAILY
Qty: 90 CAPSULE | Refills: 1 | Status: SHIPPED | OUTPATIENT
Start: 2021-02-05 | End: 2021-03-16

## 2021-02-05 RX ORDER — ATORVASTATIN CALCIUM 20 MG/1
20 TABLET, FILM COATED ORAL DAILY
Qty: 90 TABLET | Refills: 1 | Status: SHIPPED | OUTPATIENT
Start: 2021-02-05 | End: 2021-09-16 | Stop reason: SDUPTHER

## 2021-02-05 RX ORDER — LOSARTAN POTASSIUM AND HYDROCHLOROTHIAZIDE 12.5; 1 MG/1; MG/1
1 TABLET ORAL DAILY
Qty: 90 TABLET | Refills: 1 | Status: SHIPPED | OUTPATIENT
Start: 2021-02-05 | End: 2021-09-16 | Stop reason: SDUPTHER

## 2021-02-05 RX ORDER — TIZANIDINE 4 MG/1
TABLET ORAL
Qty: 90 TABLET | Refills: 11 | Status: SHIPPED | OUTPATIENT
Start: 2021-02-05 | End: 2021-12-13 | Stop reason: SDUPTHER

## 2021-02-05 RX ORDER — GABAPENTIN 300 MG/1
300 CAPSULE ORAL 3 TIMES DAILY
Qty: 90 CAPSULE | Refills: 11 | Status: SHIPPED | OUTPATIENT
Start: 2021-02-05 | End: 2021-12-13 | Stop reason: SDUPTHER

## 2021-02-05 RX ORDER — HYDROCHLOROTHIAZIDE 12.5 MG/1
12.5 TABLET ORAL DAILY
Qty: 90 TABLET | Refills: 1 | Status: SHIPPED | OUTPATIENT
Start: 2021-02-05 | End: 2021-12-13 | Stop reason: SDUPTHER

## 2021-02-18 ENCOUNTER — IMMUNIZATION (OUTPATIENT)
Dept: PRIMARY CARE CLINIC | Facility: CLINIC | Age: 66
End: 2021-02-18
Payer: MEDICARE

## 2021-02-18 DIAGNOSIS — Z23 NEED FOR VACCINATION: Primary | ICD-10-CM

## 2021-02-18 PROCEDURE — 91300 COVID-19, MRNA, LNP-S, PF, 30 MCG/0.3 ML DOSE VACCINE: ICD-10-PCS | Mod: S$GLB,,, | Performed by: FAMILY MEDICINE

## 2021-02-18 PROCEDURE — 0001A COVID-19, MRNA, LNP-S, PF, 30 MCG/0.3 ML DOSE VACCINE: ICD-10-PCS | Mod: CV19,S$GLB,, | Performed by: FAMILY MEDICINE

## 2021-02-18 PROCEDURE — 0001A COVID-19, MRNA, LNP-S, PF, 30 MCG/0.3 ML DOSE VACCINE: CPT | Mod: CV19,S$GLB,, | Performed by: FAMILY MEDICINE

## 2021-02-18 PROCEDURE — 91300 COVID-19, MRNA, LNP-S, PF, 30 MCG/0.3 ML DOSE VACCINE: CPT | Mod: S$GLB,,, | Performed by: FAMILY MEDICINE

## 2021-02-26 ENCOUNTER — TELEPHONE (OUTPATIENT)
Dept: FAMILY MEDICINE | Facility: CLINIC | Age: 66
End: 2021-02-26

## 2021-02-26 DIAGNOSIS — Z79.899 ENCOUNTER FOR LONG-TERM (CURRENT) USE OF OTHER MEDICATIONS: Primary | ICD-10-CM

## 2021-03-11 ENCOUNTER — IMMUNIZATION (OUTPATIENT)
Dept: PRIMARY CARE CLINIC | Facility: CLINIC | Age: 66
End: 2021-03-11
Payer: MEDICARE

## 2021-03-11 DIAGNOSIS — Z23 NEED FOR VACCINATION: Primary | ICD-10-CM

## 2021-03-11 PROCEDURE — 0002A COVID-19, MRNA, LNP-S, PF, 30 MCG/0.3 ML DOSE VACCINE: ICD-10-PCS | Mod: CV19,S$GLB,, | Performed by: FAMILY MEDICINE

## 2021-03-11 PROCEDURE — 91300 COVID-19, MRNA, LNP-S, PF, 30 MCG/0.3 ML DOSE VACCINE: CPT | Mod: S$GLB,,, | Performed by: FAMILY MEDICINE

## 2021-03-11 PROCEDURE — 0002A COVID-19, MRNA, LNP-S, PF, 30 MCG/0.3 ML DOSE VACCINE: CPT | Mod: CV19,S$GLB,, | Performed by: FAMILY MEDICINE

## 2021-03-11 PROCEDURE — 91300 COVID-19, MRNA, LNP-S, PF, 30 MCG/0.3 ML DOSE VACCINE: ICD-10-PCS | Mod: S$GLB,,, | Performed by: FAMILY MEDICINE

## 2021-03-12 LAB
ALBUMIN SERPL-MCNC: 4.5 G/DL (ref 3.6–5.1)
ALBUMIN/CREAT UR: NORMAL MCG/MG CREAT
ALBUMIN/GLOB SERPL: 1.6 (CALC) (ref 1–2.5)
ALP SERPL-CCNC: 91 U/L (ref 37–153)
ALT SERPL-CCNC: 21 U/L (ref 6–29)
APPEARANCE UR: CLEAR
AST SERPL-CCNC: 16 U/L (ref 10–35)
BACTERIA #/AREA URNS HPF: ABNORMAL /HPF
BACTERIA UR CULT: ABNORMAL
BASOPHILS # BLD AUTO: 41 CELLS/UL (ref 0–200)
BASOPHILS NFR BLD AUTO: 0.6 %
BILIRUB SERPL-MCNC: 0.6 MG/DL (ref 0.2–1.2)
BILIRUB UR QL STRIP: NEGATIVE
BUN SERPL-MCNC: 14 MG/DL (ref 7–25)
BUN/CREAT SERPL: ABNORMAL (CALC) (ref 6–22)
CALCIUM SERPL-MCNC: 9.8 MG/DL (ref 8.6–10.4)
CHLORIDE SERPL-SCNC: 99 MMOL/L (ref 98–110)
CHOLEST SERPL-MCNC: 212 MG/DL
CHOLEST/HDLC SERPL: 3.6 (CALC)
CO2 SERPL-SCNC: 28 MMOL/L (ref 20–32)
COLOR UR: YELLOW
CREAT SERPL-MCNC: 0.9 MG/DL (ref 0.5–0.99)
CREAT UR-MCNC: 56 MG/DL (ref 20–275)
EOSINOPHIL # BLD AUTO: 173 CELLS/UL (ref 15–500)
EOSINOPHIL NFR BLD AUTO: 2.5 %
ERYTHROCYTE [DISTWIDTH] IN BLOOD BY AUTOMATED COUNT: 15 % (ref 11–15)
GFRSERPLBLD MDRD-ARVRAT: 66 ML/MIN/1.73M2
GLOBULIN SER CALC-MCNC: 2.9 G/DL (CALC) (ref 1.9–3.7)
GLUCOSE SERPL-MCNC: 104 MG/DL (ref 65–99)
GLUCOSE UR QL STRIP: NEGATIVE
HCT VFR BLD AUTO: 42.9 % (ref 35–45)
HDLC SERPL-MCNC: 59 MG/DL
HGB BLD-MCNC: 13.9 G/DL (ref 11.7–15.5)
HGB UR QL STRIP: NEGATIVE
HYALINE CASTS #/AREA URNS LPF: ABNORMAL /LPF
KETONES UR QL STRIP: NEGATIVE
LDLC SERPL CALC-MCNC: 121 MG/DL (CALC)
LEUKOCYTE ESTERASE UR QL STRIP: NEGATIVE
LYMPHOCYTES # BLD AUTO: 2629 CELLS/UL (ref 850–3900)
LYMPHOCYTES NFR BLD AUTO: 38.1 %
MCH RBC QN AUTO: 27.6 PG (ref 27–33)
MCHC RBC AUTO-ENTMCNC: 32.4 G/DL (ref 32–36)
MCV RBC AUTO: 85.1 FL (ref 80–100)
MICROALBUMIN UR-MCNC: <0.2 MG/DL
MONOCYTES # BLD AUTO: 497 CELLS/UL (ref 200–950)
MONOCYTES NFR BLD AUTO: 7.2 %
NEUTROPHILS # BLD AUTO: 3560 CELLS/UL (ref 1500–7800)
NEUTROPHILS NFR BLD AUTO: 51.6 %
NITRITE UR QL STRIP: NEGATIVE
NONHDLC SERPL-MCNC: 153 MG/DL (CALC)
PH UR STRIP: 7 [PH] (ref 5–8)
PLATELET # BLD AUTO: 306 THOUSAND/UL (ref 140–400)
PMV BLD REES-ECKER: 9.7 FL (ref 7.5–12.5)
POTASSIUM SERPL-SCNC: 4.1 MMOL/L (ref 3.5–5.3)
PROT SERPL-MCNC: 7.4 G/DL (ref 6.1–8.1)
PROT UR QL STRIP: NEGATIVE
RBC # BLD AUTO: 5.04 MILLION/UL (ref 3.8–5.1)
RBC #/AREA URNS HPF: ABNORMAL /HPF
SODIUM SERPL-SCNC: 139 MMOL/L (ref 135–146)
SP GR UR STRIP: 1.01 (ref 1–1.03)
SQUAMOUS #/AREA URNS HPF: ABNORMAL /HPF
TRIGL SERPL-MCNC: 196 MG/DL
TSH SERPL-ACNC: 1.66 MIU/L (ref 0.4–4.5)
WBC # BLD AUTO: 6.9 THOUSAND/UL (ref 3.8–10.8)
WBC #/AREA URNS HPF: ABNORMAL /HPF

## 2021-03-15 ENCOUNTER — TELEPHONE (OUTPATIENT)
Dept: FAMILY MEDICINE | Facility: CLINIC | Age: 66
End: 2021-03-15

## 2021-03-16 ENCOUNTER — OFFICE VISIT (OUTPATIENT)
Dept: FAMILY MEDICINE | Facility: CLINIC | Age: 66
End: 2021-03-16
Payer: MEDICARE

## 2021-03-16 VITALS
WEIGHT: 217 LBS | DIASTOLIC BLOOD PRESSURE: 84 MMHG | BODY MASS INDEX: 40.97 KG/M2 | HEIGHT: 61 IN | SYSTOLIC BLOOD PRESSURE: 122 MMHG | HEART RATE: 72 BPM

## 2021-03-16 DIAGNOSIS — Z79.899 ENCOUNTER FOR LONG-TERM (CURRENT) USE OF OTHER MEDICATIONS: ICD-10-CM

## 2021-03-16 DIAGNOSIS — F41.9 ANXIETY: ICD-10-CM

## 2021-03-16 DIAGNOSIS — Z78.0 MENOPAUSE: ICD-10-CM

## 2021-03-16 DIAGNOSIS — E03.9 HYPOTHYROIDISM, UNSPECIFIED TYPE: ICD-10-CM

## 2021-03-16 DIAGNOSIS — E78.5 HYPERLIPIDEMIA, UNSPECIFIED HYPERLIPIDEMIA TYPE: ICD-10-CM

## 2021-03-16 DIAGNOSIS — M43.16 SPONDYLOLISTHESIS OF LUMBAR REGION: ICD-10-CM

## 2021-03-16 DIAGNOSIS — Z12.31 BREAST CANCER SCREENING BY MAMMOGRAM: Primary | ICD-10-CM

## 2021-03-16 DIAGNOSIS — Z12.11 SCREENING FOR COLON CANCER: ICD-10-CM

## 2021-03-16 DIAGNOSIS — I10 HYPERTENSION, UNSPECIFIED TYPE: ICD-10-CM

## 2021-03-16 PROCEDURE — 99214 OFFICE O/P EST MOD 30 MIN: CPT | Mod: S$GLB,,, | Performed by: NURSE PRACTITIONER

## 2021-03-16 PROCEDURE — 1159F MED LIST DOCD IN RCRD: CPT | Mod: S$GLB,,, | Performed by: NURSE PRACTITIONER

## 2021-03-16 PROCEDURE — 3074F SYST BP LT 130 MM HG: CPT | Mod: S$GLB,,, | Performed by: NURSE PRACTITIONER

## 2021-03-16 PROCEDURE — 3288F PR FALLS RISK ASSESSMENT DOCUMENTED: ICD-10-PCS | Mod: S$GLB,,, | Performed by: NURSE PRACTITIONER

## 2021-03-16 PROCEDURE — 99214 PR OFFICE/OUTPT VISIT, EST, LEVL IV, 30-39 MIN: ICD-10-PCS | Mod: S$GLB,,, | Performed by: NURSE PRACTITIONER

## 2021-03-16 PROCEDURE — 3079F DIAST BP 80-89 MM HG: CPT | Mod: S$GLB,,, | Performed by: NURSE PRACTITIONER

## 2021-03-16 PROCEDURE — 1159F PR MEDICATION LIST DOCUMENTED IN MEDICAL RECORD: ICD-10-PCS | Mod: S$GLB,,, | Performed by: NURSE PRACTITIONER

## 2021-03-16 PROCEDURE — 3079F PR MOST RECENT DIASTOLIC BLOOD PRESSURE 80-89 MM HG: ICD-10-PCS | Mod: S$GLB,,, | Performed by: NURSE PRACTITIONER

## 2021-03-16 PROCEDURE — 3288F FALL RISK ASSESSMENT DOCD: CPT | Mod: S$GLB,,, | Performed by: NURSE PRACTITIONER

## 2021-03-16 PROCEDURE — 1101F PT FALLS ASSESS-DOCD LE1/YR: CPT | Mod: S$GLB,,, | Performed by: NURSE PRACTITIONER

## 2021-03-16 PROCEDURE — 3074F PR MOST RECENT SYSTOLIC BLOOD PRESSURE < 130 MM HG: ICD-10-PCS | Mod: S$GLB,,, | Performed by: NURSE PRACTITIONER

## 2021-03-16 PROCEDURE — 3008F BODY MASS INDEX DOCD: CPT | Mod: S$GLB,,, | Performed by: NURSE PRACTITIONER

## 2021-03-16 PROCEDURE — 1101F PR PT FALLS ASSESS DOC 0-1 FALLS W/OUT INJ PAST YR: ICD-10-PCS | Mod: S$GLB,,, | Performed by: NURSE PRACTITIONER

## 2021-03-16 PROCEDURE — 3008F PR BODY MASS INDEX (BMI) DOCUMENTED: ICD-10-PCS | Mod: S$GLB,,, | Performed by: NURSE PRACTITIONER

## 2021-04-26 ENCOUNTER — HOSPITAL ENCOUNTER (OUTPATIENT)
Dept: RADIOLOGY | Facility: HOSPITAL | Age: 66
Discharge: HOME | End: 2021-04-26
Attending: NURSE PRACTITIONER
Payer: MEDICARE

## 2021-04-26 VITALS — BODY MASS INDEX: 41.68 KG/M2 | HEIGHT: 61 IN

## 2021-04-26 DIAGNOSIS — Z12.31 BREAST CANCER SCREENING BY MAMMOGRAM: ICD-10-CM

## 2021-04-26 DIAGNOSIS — Z78.0 MENOPAUSE: ICD-10-CM

## 2021-04-26 PROCEDURE — 77067 SCR MAMMO BI INCL CAD: CPT | Mod: TC,PO

## 2021-04-26 PROCEDURE — 77080 DXA BONE DENSITY AXIAL: CPT | Mod: TC,PO

## 2021-09-16 ENCOUNTER — OFFICE VISIT (OUTPATIENT)
Dept: FAMILY MEDICINE | Facility: CLINIC | Age: 66
End: 2021-09-16
Payer: MEDICARE

## 2021-09-16 VITALS
DIASTOLIC BLOOD PRESSURE: 64 MMHG | SYSTOLIC BLOOD PRESSURE: 114 MMHG | WEIGHT: 208 LBS | HEART RATE: 60 BPM | BODY MASS INDEX: 39.27 KG/M2 | HEIGHT: 61 IN

## 2021-09-16 DIAGNOSIS — Z79.899 HIGH RISK MEDICATION USE: Primary | ICD-10-CM

## 2021-09-16 DIAGNOSIS — Z12.11 SCREENING FOR COLON CANCER: ICD-10-CM

## 2021-09-16 DIAGNOSIS — I10 HYPERTENSION, UNSPECIFIED TYPE: ICD-10-CM

## 2021-09-16 DIAGNOSIS — E78.5 HYPERLIPIDEMIA, UNSPECIFIED HYPERLIPIDEMIA TYPE: ICD-10-CM

## 2021-09-16 DIAGNOSIS — B35.1 TOENAIL FUNGUS: ICD-10-CM

## 2021-09-16 PROCEDURE — 3078F PR MOST RECENT DIASTOLIC BLOOD PRESSURE < 80 MM HG: ICD-10-PCS | Mod: S$GLB,,, | Performed by: NURSE PRACTITIONER

## 2021-09-16 PROCEDURE — 3008F PR BODY MASS INDEX (BMI) DOCUMENTED: ICD-10-PCS | Mod: S$GLB,,, | Performed by: NURSE PRACTITIONER

## 2021-09-16 PROCEDURE — 1159F PR MEDICATION LIST DOCUMENTED IN MEDICAL RECORD: ICD-10-PCS | Mod: S$GLB,,, | Performed by: NURSE PRACTITIONER

## 2021-09-16 PROCEDURE — 1160F PR REVIEW ALL MEDS BY PRESCRIBER/CLIN PHARMACIST DOCUMENTED: ICD-10-PCS | Mod: S$GLB,,, | Performed by: NURSE PRACTITIONER

## 2021-09-16 PROCEDURE — 99214 PR OFFICE/OUTPT VISIT, EST, LEVL IV, 30-39 MIN: ICD-10-PCS | Mod: S$GLB,,, | Performed by: NURSE PRACTITIONER

## 2021-09-16 PROCEDURE — 1160F RVW MEDS BY RX/DR IN RCRD: CPT | Mod: S$GLB,,, | Performed by: NURSE PRACTITIONER

## 2021-09-16 PROCEDURE — 3074F PR MOST RECENT SYSTOLIC BLOOD PRESSURE < 130 MM HG: ICD-10-PCS | Mod: S$GLB,,, | Performed by: NURSE PRACTITIONER

## 2021-09-16 PROCEDURE — 3008F BODY MASS INDEX DOCD: CPT | Mod: S$GLB,,, | Performed by: NURSE PRACTITIONER

## 2021-09-16 PROCEDURE — 1159F MED LIST DOCD IN RCRD: CPT | Mod: S$GLB,,, | Performed by: NURSE PRACTITIONER

## 2021-09-16 PROCEDURE — 3061F PR NEG MICROALBUMINURIA RESULT DOCUMENTED/REVIEW: ICD-10-PCS | Mod: S$GLB,,, | Performed by: NURSE PRACTITIONER

## 2021-09-16 PROCEDURE — 3078F DIAST BP <80 MM HG: CPT | Mod: S$GLB,,, | Performed by: NURSE PRACTITIONER

## 2021-09-16 PROCEDURE — 3066F NEPHROPATHY DOC TX: CPT | Mod: S$GLB,,, | Performed by: NURSE PRACTITIONER

## 2021-09-16 PROCEDURE — 3061F NEG MICROALBUMINURIA REV: CPT | Mod: S$GLB,,, | Performed by: NURSE PRACTITIONER

## 2021-09-16 PROCEDURE — 3066F PR DOCUMENTATION OF TREATMENT FOR NEPHROPATHY: ICD-10-PCS | Mod: S$GLB,,, | Performed by: NURSE PRACTITIONER

## 2021-09-16 PROCEDURE — 99214 OFFICE O/P EST MOD 30 MIN: CPT | Mod: S$GLB,,, | Performed by: NURSE PRACTITIONER

## 2021-09-16 PROCEDURE — 3074F SYST BP LT 130 MM HG: CPT | Mod: S$GLB,,, | Performed by: NURSE PRACTITIONER

## 2021-09-16 RX ORDER — LOSARTAN POTASSIUM AND HYDROCHLOROTHIAZIDE 12.5; 1 MG/1; MG/1
1 TABLET ORAL DAILY
Qty: 90 TABLET | Refills: 1 | Status: CANCELLED | OUTPATIENT
Start: 2021-09-16 | End: 2021-12-15

## 2021-09-16 RX ORDER — ATORVASTATIN CALCIUM 20 MG/1
20 TABLET, FILM COATED ORAL DAILY
Qty: 90 TABLET | Refills: 1 | Status: SHIPPED | OUTPATIENT
Start: 2021-09-16 | End: 2021-12-13 | Stop reason: SDUPTHER

## 2021-09-16 RX ORDER — LOSARTAN POTASSIUM AND HYDROCHLOROTHIAZIDE 12.5; 1 MG/1; MG/1
1 TABLET ORAL DAILY
Qty: 90 TABLET | Refills: 1 | Status: SHIPPED | OUTPATIENT
Start: 2021-09-16 | End: 2021-12-13 | Stop reason: SDUPTHER

## 2021-09-16 RX ORDER — CICLOPIROX 80 MG/ML
SOLUTION TOPICAL NIGHTLY
Qty: 6.6 ML | Refills: 3 | Status: SHIPPED | OUTPATIENT
Start: 2021-09-16 | End: 2024-03-17

## 2021-09-17 LAB
ALBUMIN SERPL-MCNC: 4.2 G/DL (ref 3.6–5.1)
ALBUMIN/GLOB SERPL: 1.6 (CALC) (ref 1–2.5)
ALP SERPL-CCNC: 61 U/L (ref 37–153)
ALT SERPL-CCNC: 17 U/L (ref 6–29)
AST SERPL-CCNC: 14 U/L (ref 10–35)
BILIRUB SERPL-MCNC: 0.6 MG/DL (ref 0.2–1.2)
BUN SERPL-MCNC: 16 MG/DL (ref 7–25)
BUN/CREAT SERPL: 16 (CALC) (ref 6–22)
CALCIUM SERPL-MCNC: 9.6 MG/DL (ref 8.6–10.4)
CHLORIDE SERPL-SCNC: 103 MMOL/L (ref 98–110)
CHOLEST SERPL-MCNC: 160 MG/DL
CHOLEST/HDLC SERPL: 3 (CALC)
CO2 SERPL-SCNC: 28 MMOL/L (ref 20–32)
CREAT SERPL-MCNC: 1 MG/DL (ref 0.5–0.99)
GLOBULIN SER CALC-MCNC: 2.7 G/DL (CALC) (ref 1.9–3.7)
GLUCOSE SERPL-MCNC: 84 MG/DL (ref 65–139)
HDLC SERPL-MCNC: 54 MG/DL
LDLC SERPL CALC-MCNC: 84 MG/DL (CALC)
NONHDLC SERPL-MCNC: 106 MG/DL (CALC)
POTASSIUM SERPL-SCNC: 4 MMOL/L (ref 3.5–5.3)
PROT SERPL-MCNC: 6.9 G/DL (ref 6.1–8.1)
SODIUM SERPL-SCNC: 139 MMOL/L (ref 135–146)
TRIGL SERPL-MCNC: 127 MG/DL

## 2021-10-02 ENCOUNTER — PATIENT MESSAGE (OUTPATIENT)
Dept: FAMILY MEDICINE | Facility: CLINIC | Age: 66
End: 2021-10-02

## 2021-10-11 ENCOUNTER — IMMUNIZATION (OUTPATIENT)
Dept: PRIMARY CARE CLINIC | Facility: CLINIC | Age: 66
End: 2021-10-11
Payer: MEDICARE

## 2021-10-11 DIAGNOSIS — Z23 NEED FOR VACCINATION: Primary | ICD-10-CM

## 2021-10-11 PROCEDURE — 91300 COVID-19, MRNA, LNP-S, PF, 30 MCG/0.3 ML DOSE VACCINE: CPT | Mod: S$GLB,,, | Performed by: FAMILY MEDICINE

## 2021-10-11 PROCEDURE — 0003A COVID-19, MRNA, LNP-S, PF, 30 MCG/0.3 ML DOSE VACCINE: CPT | Mod: S$GLB,,, | Performed by: FAMILY MEDICINE

## 2021-10-11 PROCEDURE — 0003A COVID-19, MRNA, LNP-S, PF, 30 MCG/0.3 ML DOSE VACCINE: ICD-10-PCS | Mod: S$GLB,,, | Performed by: FAMILY MEDICINE

## 2021-10-11 PROCEDURE — 91300 COVID-19, MRNA, LNP-S, PF, 30 MCG/0.3 ML DOSE VACCINE: ICD-10-PCS | Mod: S$GLB,,, | Performed by: FAMILY MEDICINE

## 2021-11-11 ENCOUNTER — TELEPHONE (OUTPATIENT)
Dept: FAMILY MEDICINE | Facility: CLINIC | Age: 66
End: 2021-11-11
Payer: MEDICARE

## 2021-11-15 ENCOUNTER — OFFICE VISIT (OUTPATIENT)
Dept: FAMILY MEDICINE | Facility: CLINIC | Age: 66
End: 2021-11-15
Payer: MEDICARE

## 2021-11-15 VITALS
SYSTOLIC BLOOD PRESSURE: 132 MMHG | DIASTOLIC BLOOD PRESSURE: 70 MMHG | BODY MASS INDEX: 40.84 KG/M2 | WEIGHT: 208 LBS | HEIGHT: 60 IN | HEART RATE: 68 BPM

## 2021-11-15 DIAGNOSIS — I10 HYPERTENSION, UNSPECIFIED TYPE: ICD-10-CM

## 2021-11-15 DIAGNOSIS — Z63.79 STRESSFUL LIFE EVENT AFFECTING FAMILY: ICD-10-CM

## 2021-11-15 DIAGNOSIS — Z79.899 HIGH RISK MEDICATION USE: ICD-10-CM

## 2021-11-15 DIAGNOSIS — E78.5 HYPERLIPIDEMIA, UNSPECIFIED HYPERLIPIDEMIA TYPE: ICD-10-CM

## 2021-11-15 DIAGNOSIS — F98.8 ATTENTION DEFICIT DISORDER, UNSPECIFIED HYPERACTIVITY PRESENCE: ICD-10-CM

## 2021-11-15 DIAGNOSIS — Z23 NEED FOR IMMUNIZATION AGAINST INFLUENZA: Primary | ICD-10-CM

## 2021-11-15 DIAGNOSIS — F41.9 ANXIETY: ICD-10-CM

## 2021-11-15 DIAGNOSIS — F32.A DEPRESSION, UNSPECIFIED DEPRESSION TYPE: ICD-10-CM

## 2021-11-15 PROCEDURE — 90662 FLU VACCINE - QUADRIVALENT - HIGH DOSE (65+) PRESERVATIVE FREE IM: ICD-10-PCS | Mod: S$GLB,,, | Performed by: NURSE PRACTITIONER

## 2021-11-15 PROCEDURE — 3066F PR DOCUMENTATION OF TREATMENT FOR NEPHROPATHY: ICD-10-PCS | Mod: S$GLB,,, | Performed by: NURSE PRACTITIONER

## 2021-11-15 PROCEDURE — 3061F PR NEG MICROALBUMINURIA RESULT DOCUMENTED/REVIEW: ICD-10-PCS | Mod: S$GLB,,, | Performed by: NURSE PRACTITIONER

## 2021-11-15 PROCEDURE — G0008 ADMIN INFLUENZA VIRUS VAC: HCPCS | Mod: S$GLB,,, | Performed by: NURSE PRACTITIONER

## 2021-11-15 PROCEDURE — G0008 FLU VACCINE - QUADRIVALENT - HIGH DOSE (65+) PRESERVATIVE FREE IM: ICD-10-PCS | Mod: S$GLB,,, | Performed by: NURSE PRACTITIONER

## 2021-11-15 PROCEDURE — 3066F NEPHROPATHY DOC TX: CPT | Mod: S$GLB,,, | Performed by: NURSE PRACTITIONER

## 2021-11-15 PROCEDURE — 99214 OFFICE O/P EST MOD 30 MIN: CPT | Mod: 25,S$GLB,, | Performed by: NURSE PRACTITIONER

## 2021-11-15 PROCEDURE — 3061F NEG MICROALBUMINURIA REV: CPT | Mod: S$GLB,,, | Performed by: NURSE PRACTITIONER

## 2021-11-15 PROCEDURE — 99214 PR OFFICE/OUTPT VISIT, EST, LEVL IV, 30-39 MIN: ICD-10-PCS | Mod: 25,S$GLB,, | Performed by: NURSE PRACTITIONER

## 2021-11-15 PROCEDURE — 90662 IIV NO PRSV INCREASED AG IM: CPT | Mod: S$GLB,,, | Performed by: NURSE PRACTITIONER

## 2021-11-15 RX ORDER — ATOMOXETINE 10 MG/1
10 CAPSULE ORAL DAILY
Qty: 30 CAPSULE | Refills: 0 | Status: SHIPPED | OUTPATIENT
Start: 2021-11-15 | End: 2021-11-16 | Stop reason: SDUPTHER

## 2021-11-16 ENCOUNTER — PATIENT MESSAGE (OUTPATIENT)
Dept: FAMILY MEDICINE | Facility: CLINIC | Age: 66
End: 2021-11-16
Payer: MEDICARE

## 2021-11-16 RX ORDER — ATOMOXETINE 10 MG/1
10 CAPSULE ORAL DAILY
Qty: 30 CAPSULE | Refills: 0 | Status: CANCELLED | OUTPATIENT
Start: 2021-11-16 | End: 2021-12-16

## 2021-11-16 RX ORDER — ATOMOXETINE 10 MG/1
10 CAPSULE ORAL DAILY
Qty: 30 CAPSULE | Refills: 0 | Status: SHIPPED | OUTPATIENT
Start: 2021-11-16 | End: 2021-11-18

## 2021-11-18 ENCOUNTER — PATIENT MESSAGE (OUTPATIENT)
Dept: FAMILY MEDICINE | Facility: CLINIC | Age: 66
End: 2021-11-18
Payer: MEDICARE

## 2021-11-19 ENCOUNTER — PATIENT MESSAGE (OUTPATIENT)
Dept: FAMILY MEDICINE | Facility: CLINIC | Age: 66
End: 2021-11-19
Payer: MEDICARE

## 2021-11-19 RX ORDER — ATOMOXETINE 10 MG/1
10 CAPSULE ORAL DAILY
Qty: 30 CAPSULE | Refills: 0 | Status: SHIPPED | OUTPATIENT
Start: 2021-11-19 | End: 2021-12-13 | Stop reason: SDUPTHER

## 2021-12-13 ENCOUNTER — OFFICE VISIT (OUTPATIENT)
Dept: FAMILY MEDICINE | Facility: CLINIC | Age: 66
End: 2021-12-13
Payer: MEDICARE

## 2021-12-13 VITALS
WEIGHT: 209 LBS | HEART RATE: 72 BPM | BODY MASS INDEX: 41.03 KG/M2 | DIASTOLIC BLOOD PRESSURE: 76 MMHG | SYSTOLIC BLOOD PRESSURE: 132 MMHG | HEIGHT: 60 IN

## 2021-12-13 DIAGNOSIS — E78.5 HYPERLIPIDEMIA, UNSPECIFIED HYPERLIPIDEMIA TYPE: ICD-10-CM

## 2021-12-13 DIAGNOSIS — Z12.11 SPECIAL SCREENING FOR MALIGNANT NEOPLASMS, COLON: Primary | ICD-10-CM

## 2021-12-13 DIAGNOSIS — F98.8 ATTENTION DEFICIT DISORDER, UNSPECIFIED HYPERACTIVITY PRESENCE: ICD-10-CM

## 2021-12-13 DIAGNOSIS — I10 HYPERTENSION, UNSPECIFIED TYPE: ICD-10-CM

## 2021-12-13 DIAGNOSIS — Z63.79 STRESSFUL LIFE EVENT AFFECTING FAMILY: ICD-10-CM

## 2021-12-13 DIAGNOSIS — G89.29 CHRONIC MIDLINE LOW BACK PAIN WITHOUT SCIATICA: ICD-10-CM

## 2021-12-13 DIAGNOSIS — M54.50 CHRONIC MIDLINE LOW BACK PAIN WITHOUT SCIATICA: ICD-10-CM

## 2021-12-13 DIAGNOSIS — Z98.1 STATUS POST LUMBAR SPINAL FUSION: ICD-10-CM

## 2021-12-13 DIAGNOSIS — M43.16 SPONDYLOLISTHESIS OF LUMBAR REGION: ICD-10-CM

## 2021-12-13 PROCEDURE — 3066F NEPHROPATHY DOC TX: CPT | Mod: S$GLB,,, | Performed by: NURSE PRACTITIONER

## 2021-12-13 PROCEDURE — 99214 PR OFFICE/OUTPT VISIT, EST, LEVL IV, 30-39 MIN: ICD-10-PCS | Mod: S$GLB,,, | Performed by: NURSE PRACTITIONER

## 2021-12-13 PROCEDURE — 3061F NEG MICROALBUMINURIA REV: CPT | Mod: S$GLB,,, | Performed by: NURSE PRACTITIONER

## 2021-12-13 PROCEDURE — 3066F PR DOCUMENTATION OF TREATMENT FOR NEPHROPATHY: ICD-10-PCS | Mod: S$GLB,,, | Performed by: NURSE PRACTITIONER

## 2021-12-13 PROCEDURE — 99214 OFFICE O/P EST MOD 30 MIN: CPT | Mod: S$GLB,,, | Performed by: NURSE PRACTITIONER

## 2021-12-13 PROCEDURE — 3061F PR NEG MICROALBUMINURIA RESULT DOCUMENTED/REVIEW: ICD-10-PCS | Mod: S$GLB,,, | Performed by: NURSE PRACTITIONER

## 2021-12-13 RX ORDER — GABAPENTIN 300 MG/1
300 CAPSULE ORAL 3 TIMES DAILY
Qty: 90 CAPSULE | Refills: 11 | Status: SHIPPED | OUTPATIENT
Start: 2021-12-13 | End: 2022-03-16 | Stop reason: SDUPTHER

## 2021-12-13 RX ORDER — HYDROCHLOROTHIAZIDE 12.5 MG/1
12.5 TABLET ORAL DAILY
Qty: 90 TABLET | Refills: 1 | Status: SHIPPED | OUTPATIENT
Start: 2021-12-13 | End: 2022-03-16

## 2021-12-13 RX ORDER — ATORVASTATIN CALCIUM 20 MG/1
20 TABLET, FILM COATED ORAL DAILY
Qty: 90 TABLET | Refills: 1 | Status: SHIPPED | OUTPATIENT
Start: 2021-12-13 | End: 2022-03-16 | Stop reason: SDUPTHER

## 2021-12-13 RX ORDER — LOSARTAN POTASSIUM AND HYDROCHLOROTHIAZIDE 12.5; 1 MG/1; MG/1
1 TABLET ORAL DAILY
Qty: 90 TABLET | Refills: 1 | Status: SHIPPED | OUTPATIENT
Start: 2021-12-13 | End: 2022-03-16 | Stop reason: SDUPTHER

## 2021-12-13 RX ORDER — TIZANIDINE 4 MG/1
TABLET ORAL
Qty: 90 TABLET | Refills: 11 | Status: SHIPPED | OUTPATIENT
Start: 2021-12-13 | End: 2022-03-16 | Stop reason: SDUPTHER

## 2021-12-13 RX ORDER — ATOMOXETINE 10 MG/1
10 CAPSULE ORAL DAILY
Qty: 30 CAPSULE | Refills: 0 | Status: SHIPPED | OUTPATIENT
Start: 2021-12-13 | End: 2022-01-12

## 2022-01-10 ENCOUNTER — PATIENT MESSAGE (OUTPATIENT)
Dept: FAMILY MEDICINE | Facility: CLINIC | Age: 67
End: 2022-01-10
Payer: MEDICARE

## 2022-01-10 RX ORDER — ATOMOXETINE 18 MG/1
18 CAPSULE ORAL DAILY
Qty: 30 CAPSULE | Refills: 0 | Status: SHIPPED | OUTPATIENT
Start: 2022-01-10 | End: 2022-01-17 | Stop reason: SDUPTHER

## 2022-01-15 ENCOUNTER — PATIENT MESSAGE (OUTPATIENT)
Dept: FAMILY MEDICINE | Facility: CLINIC | Age: 67
End: 2022-01-15
Payer: MEDICARE

## 2022-01-17 RX ORDER — ATOMOXETINE 18 MG/1
18 CAPSULE ORAL DAILY
Qty: 30 CAPSULE | Refills: 0 | Status: SHIPPED | OUTPATIENT
Start: 2022-01-17 | End: 2022-02-07 | Stop reason: SDUPTHER

## 2022-02-06 ENCOUNTER — PATIENT MESSAGE (OUTPATIENT)
Dept: FAMILY MEDICINE | Facility: CLINIC | Age: 67
End: 2022-02-06
Payer: MEDICARE

## 2022-02-07 ENCOUNTER — PATIENT MESSAGE (OUTPATIENT)
Dept: FAMILY MEDICINE | Facility: CLINIC | Age: 67
End: 2022-02-07
Payer: MEDICARE

## 2022-02-07 RX ORDER — ATOMOXETINE 18 MG/1
18 CAPSULE ORAL DAILY
Qty: 30 CAPSULE | Refills: 0 | Status: SHIPPED | OUTPATIENT
Start: 2022-02-16 | End: 2022-02-21 | Stop reason: SDUPTHER

## 2022-02-13 ENCOUNTER — PATIENT MESSAGE (OUTPATIENT)
Dept: FAMILY MEDICINE | Facility: CLINIC | Age: 67
End: 2022-02-13
Payer: MEDICARE

## 2022-02-14 ENCOUNTER — PATIENT MESSAGE (OUTPATIENT)
Dept: FAMILY MEDICINE | Facility: CLINIC | Age: 67
End: 2022-02-14
Payer: MEDICARE

## 2022-02-21 RX ORDER — ATOMOXETINE 18 MG/1
18 CAPSULE ORAL DAILY
Qty: 30 CAPSULE | Refills: 0 | Status: SHIPPED | OUTPATIENT
Start: 2022-02-21 | End: 2022-03-16 | Stop reason: SDUPTHER

## 2022-02-21 NOTE — TELEPHONE ENCOUNTER
----- Message from Concepcion Kee sent at 2/21/2022 10:49 AM CST -----  Pt calling to fu on the Amoxetine to [facility] on [address]. Said the medication is to expensive at [facility] so she fills at [facility].    If we can resend it ASAP she is out and will be coming to [facility] today.  # [phone]

## 2022-03-16 ENCOUNTER — OFFICE VISIT (OUTPATIENT)
Dept: FAMILY MEDICINE | Facility: CLINIC | Age: 67
End: 2022-03-16
Payer: MEDICARE

## 2022-03-16 ENCOUNTER — TELEPHONE (OUTPATIENT)
Dept: FAMILY MEDICINE | Facility: CLINIC | Age: 67
End: 2022-03-16

## 2022-03-16 VITALS
DIASTOLIC BLOOD PRESSURE: 86 MMHG | BODY MASS INDEX: 41.23 KG/M2 | WEIGHT: 210 LBS | HEART RATE: 76 BPM | SYSTOLIC BLOOD PRESSURE: 136 MMHG | HEIGHT: 60 IN

## 2022-03-16 DIAGNOSIS — F90.2 ATTENTION DEFICIT HYPERACTIVITY DISORDER (ADHD), COMBINED TYPE: ICD-10-CM

## 2022-03-16 DIAGNOSIS — Z12.11 SPECIAL SCREENING FOR MALIGNANT NEOPLASMS, COLON: Primary | ICD-10-CM

## 2022-03-16 DIAGNOSIS — F32.A DEPRESSION, UNSPECIFIED DEPRESSION TYPE: ICD-10-CM

## 2022-03-16 DIAGNOSIS — I10 HYPERTENSION, UNSPECIFIED TYPE: ICD-10-CM

## 2022-03-16 DIAGNOSIS — E78.5 HYPERLIPIDEMIA, UNSPECIFIED HYPERLIPIDEMIA TYPE: ICD-10-CM

## 2022-03-16 DIAGNOSIS — M62.838 MUSCLE SPASM: ICD-10-CM

## 2022-03-16 DIAGNOSIS — M43.16 SPONDYLOLISTHESIS OF LUMBAR REGION: ICD-10-CM

## 2022-03-16 DIAGNOSIS — Z12.39 ENCOUNTER FOR SCREENING FOR MALIGNANT NEOPLASM OF BREAST, UNSPECIFIED SCREENING MODALITY: ICD-10-CM

## 2022-03-16 DIAGNOSIS — Z12.31 BREAST CANCER SCREENING BY MAMMOGRAM: ICD-10-CM

## 2022-03-16 DIAGNOSIS — Z79.899 HIGH RISK MEDICATION USE: ICD-10-CM

## 2022-03-16 PROCEDURE — 3008F BODY MASS INDEX DOCD: CPT | Mod: S$GLB,,, | Performed by: NURSE PRACTITIONER

## 2022-03-16 PROCEDURE — 1160F RVW MEDS BY RX/DR IN RCRD: CPT | Mod: S$GLB,,, | Performed by: NURSE PRACTITIONER

## 2022-03-16 PROCEDURE — 1159F MED LIST DOCD IN RCRD: CPT | Mod: S$GLB,,, | Performed by: NURSE PRACTITIONER

## 2022-03-16 PROCEDURE — 99214 OFFICE O/P EST MOD 30 MIN: CPT | Mod: S$GLB,,, | Performed by: NURSE PRACTITIONER

## 2022-03-16 PROCEDURE — 3079F DIAST BP 80-89 MM HG: CPT | Mod: S$GLB,,, | Performed by: NURSE PRACTITIONER

## 2022-03-16 PROCEDURE — 99214 PR OFFICE/OUTPT VISIT, EST, LEVL IV, 30-39 MIN: ICD-10-PCS | Mod: S$GLB,,, | Performed by: NURSE PRACTITIONER

## 2022-03-16 PROCEDURE — 1160F PR REVIEW ALL MEDS BY PRESCRIBER/CLIN PHARMACIST DOCUMENTED: ICD-10-PCS | Mod: S$GLB,,, | Performed by: NURSE PRACTITIONER

## 2022-03-16 PROCEDURE — 3075F PR MOST RECENT SYSTOLIC BLOOD PRESS GE 130-139MM HG: ICD-10-PCS | Mod: S$GLB,,, | Performed by: NURSE PRACTITIONER

## 2022-03-16 PROCEDURE — 1159F PR MEDICATION LIST DOCUMENTED IN MEDICAL RECORD: ICD-10-PCS | Mod: S$GLB,,, | Performed by: NURSE PRACTITIONER

## 2022-03-16 PROCEDURE — 3008F PR BODY MASS INDEX (BMI) DOCUMENTED: ICD-10-PCS | Mod: S$GLB,,, | Performed by: NURSE PRACTITIONER

## 2022-03-16 PROCEDURE — 3075F SYST BP GE 130 - 139MM HG: CPT | Mod: S$GLB,,, | Performed by: NURSE PRACTITIONER

## 2022-03-16 PROCEDURE — 3079F PR MOST RECENT DIASTOLIC BLOOD PRESSURE 80-89 MM HG: ICD-10-PCS | Mod: S$GLB,,, | Performed by: NURSE PRACTITIONER

## 2022-03-16 RX ORDER — LOSARTAN POTASSIUM AND HYDROCHLOROTHIAZIDE 12.5; 1 MG/1; MG/1
1 TABLET ORAL DAILY
Qty: 90 TABLET | Refills: 1 | Status: SHIPPED | OUTPATIENT
Start: 2022-03-16 | End: 2023-03-07 | Stop reason: SDUPTHER

## 2022-03-16 RX ORDER — ATOMOXETINE 18 MG/1
18 CAPSULE ORAL DAILY
Qty: 30 CAPSULE | Refills: 3 | Status: SHIPPED | OUTPATIENT
Start: 2022-03-16 | End: 2022-06-22 | Stop reason: SDUPTHER

## 2022-03-16 RX ORDER — GABAPENTIN 300 MG/1
300 CAPSULE ORAL 3 TIMES DAILY
Qty: 90 CAPSULE | Refills: 11 | Status: SHIPPED | OUTPATIENT
Start: 2022-03-16 | End: 2023-03-14 | Stop reason: SDUPTHER

## 2022-03-16 RX ORDER — ESCITALOPRAM OXALATE 10 MG/1
10 TABLET ORAL DAILY
Qty: 90 TABLET | Refills: 1 | Status: SHIPPED | OUTPATIENT
Start: 2022-03-16 | End: 2022-06-22 | Stop reason: SDUPTHER

## 2022-03-16 RX ORDER — ATORVASTATIN CALCIUM 20 MG/1
20 TABLET, FILM COATED ORAL DAILY
Qty: 90 TABLET | Refills: 1 | Status: SHIPPED | OUTPATIENT
Start: 2022-03-16 | End: 2022-12-21 | Stop reason: SDUPTHER

## 2022-03-16 RX ORDER — TIZANIDINE 4 MG/1
TABLET ORAL
Qty: 90 TABLET | Refills: 11 | Status: SHIPPED | OUTPATIENT
Start: 2022-03-16 | End: 2023-05-11 | Stop reason: SDUPTHER

## 2022-03-17 LAB
ALBUMIN SERPL-MCNC: 4.3 G/DL (ref 3.6–5.1)
ALBUMIN/GLOB SERPL: 1.4 (CALC) (ref 1–2.5)
ALP SERPL-CCNC: 80 U/L (ref 37–153)
ALT SERPL-CCNC: 14 U/L (ref 6–29)
AST SERPL-CCNC: 15 U/L (ref 10–35)
BASOPHILS # BLD AUTO: 27 CELLS/UL (ref 0–200)
BASOPHILS NFR BLD AUTO: 0.4 %
BILIRUB SERPL-MCNC: 0.6 MG/DL (ref 0.2–1.2)
BUN SERPL-MCNC: 15 MG/DL (ref 7–25)
BUN/CREAT SERPL: ABNORMAL (CALC) (ref 6–22)
CALCIUM SERPL-MCNC: 9.7 MG/DL (ref 8.6–10.4)
CHLORIDE SERPL-SCNC: 102 MMOL/L (ref 98–110)
CHOLEST SERPL-MCNC: 186 MG/DL
CHOLEST/HDLC SERPL: 2.9 (CALC)
CO2 SERPL-SCNC: 29 MMOL/L (ref 20–32)
CREAT SERPL-MCNC: 0.99 MG/DL (ref 0.5–0.99)
EOSINOPHIL # BLD AUTO: 168 CELLS/UL (ref 15–500)
EOSINOPHIL NFR BLD AUTO: 2.5 %
ERYTHROCYTE [DISTWIDTH] IN BLOOD BY AUTOMATED COUNT: 14.3 % (ref 11–15)
GLOBULIN SER CALC-MCNC: 3 G/DL (CALC) (ref 1.9–3.7)
GLUCOSE SERPL-MCNC: 87 MG/DL (ref 65–99)
HCT VFR BLD AUTO: 41.9 % (ref 35–45)
HDLC SERPL-MCNC: 65 MG/DL
HGB BLD-MCNC: 13.8 G/DL (ref 11.7–15.5)
LDLC SERPL CALC-MCNC: 102 MG/DL (CALC)
LYMPHOCYTES # BLD AUTO: 2613 CELLS/UL (ref 850–3900)
LYMPHOCYTES NFR BLD AUTO: 39 %
MCH RBC QN AUTO: 28.1 PG (ref 27–33)
MCHC RBC AUTO-ENTMCNC: 32.9 G/DL (ref 32–36)
MCV RBC AUTO: 85.3 FL (ref 80–100)
MONOCYTES # BLD AUTO: 496 CELLS/UL (ref 200–950)
MONOCYTES NFR BLD AUTO: 7.4 %
NEUTROPHILS # BLD AUTO: 3397 CELLS/UL (ref 1500–7800)
NEUTROPHILS NFR BLD AUTO: 50.7 %
NONHDLC SERPL-MCNC: 121 MG/DL (CALC)
PLATELET # BLD AUTO: 318 THOUSAND/UL (ref 140–400)
PMV BLD REES-ECKER: 9.3 FL (ref 7.5–12.5)
POTASSIUM SERPL-SCNC: 4.3 MMOL/L (ref 3.5–5.3)
PROT SERPL-MCNC: 7.3 G/DL (ref 6.1–8.1)
RBC # BLD AUTO: 4.91 MILLION/UL (ref 3.8–5.1)
SODIUM SERPL-SCNC: 139 MMOL/L (ref 135–146)
TRIGL SERPL-MCNC: 99 MG/DL
TSH SERPL-ACNC: 0.88 MIU/L (ref 0.4–4.5)
WBC # BLD AUTO: 6.7 THOUSAND/UL (ref 3.8–10.8)

## 2022-03-21 ENCOUNTER — TELEPHONE (OUTPATIENT)
Dept: FAMILY MEDICINE | Facility: CLINIC | Age: 67
End: 2022-03-21
Payer: MEDICARE

## 2022-03-21 NOTE — TELEPHONE ENCOUNTER
----- Message from Kianna Rodrigues NP sent at 3/21/2022 12:28 AM CDT -----  Labs are stable. Continue as is.

## 2022-03-21 NOTE — PROGRESS NOTES
SUBJECTIVE:    Patient ID: Sandrita Kaiser is a 66 y.o. female.    Chief Complaint: Follow-up (Brought bottles // Mammogram - due in April, order in // Colonoscopy - refused at this time because she does not have transportation. Will do cologuard ac)    66 year old presents for follow up. Treated for hypertension, hyperlipidemia, and osteoarthritis. Taking meds as prescribed. Currently taking  straterra. Not sure if it is still effective. Feels overwhelmed at times. Lives with . Reports that he is requiring more care and supervision. Due for labs. Sleeping ok. Needs refills on meds. Due for mammogram. Last dexa was 3/21  Back has been flared up lately. Taking gabapentin with only some improvement. Does not radiate. Feels like it is localized in sacrum area.       Office Visit on 03/16/2022   Component Date Value Ref Range Status    WBC 03/16/2022 6.7  3.8 - 10.8 Thousand/uL Final    RBC 03/16/2022 4.91  3.80 - 5.10 Million/uL Final    Hemoglobin 03/16/2022 13.8  11.7 - 15.5 g/dL Final    Hematocrit 03/16/2022 41.9  35.0 - 45.0 % Final    MCV 03/16/2022 85.3  80.0 - 100.0 fL Final    MCH 03/16/2022 28.1  27.0 - 33.0 pg Final    MCHC 03/16/2022 32.9  32.0 - 36.0 g/dL Final    RDW 03/16/2022 14.3  11.0 - 15.0 % Final    Platelets 03/16/2022 318  140 - 400 Thousand/uL Final    MPV 03/16/2022 9.3  7.5 - 12.5 fL Final    Neutrophils, Abs 03/16/2022 3,397  1,500 - 7,800 cells/uL Final    Lymph # 03/16/2022 2,613  850 - 3,900 cells/uL Final    Mono # 03/16/2022 496  200 - 950 cells/uL Final    Eos # 03/16/2022 168  15 - 500 cells/uL Final    Baso # 03/16/2022 27  0 - 200 cells/uL Final    Neutrophils Relative 03/16/2022 50.7  % Final    Lymph % 03/16/2022 39.0  % Final    Mono % 03/16/2022 7.4  % Final    Eosinophil % 03/16/2022 2.5  % Final    Basophil % 03/16/2022 0.4  % Final    Glucose 03/16/2022 87  65 - 99 mg/dL Final    BUN 03/16/2022 15  7 - 25 mg/dL Final    Creatinine 03/16/2022  0.99  0.50 - 0.99 mg/dL Final    eGFR if non  2022 59 (A) > OR = 60 mL/min/1.73m2 Final    eGFR if  2022 68  > OR = 60 mL/min/1.73m2 Final    BUN/Creatinine Ratio 2022 NOT APPLICABLE  6 -  (calc) Final    Sodium 2022 139  135 - 146 mmol/L Final    Potassium 2022 4.3  3.5 - 5.3 mmol/L Final    Chloride 2022 102  98 - 110 mmol/L Final    CO2 2022 29  20 - 32 mmol/L Final    Calcium 2022 9.7  8.6 - 10.4 mg/dL Final    Total Protein 2022 7.3  6.1 - 8.1 g/dL Final    Albumin 2022 4.3  3.6 - 5.1 g/dL Final    Globulin, Total 2022 3.0  1.9 - 3.7 g/dL (calc) Final    Albumin/Globulin Ratio 2022 1.4  1.0 - 2.5 (calc) Final    Total Bilirubin 2022 0.6  0.2 - 1.2 mg/dL Final    Alkaline Phosphatase 2022 80  37 - 153 U/L Final    AST 2022 15  10 - 35 U/L Final    ALT 2022 14  6 - 29 U/L Final    Cholesterol 2022 186  <200 mg/dL Final    HDL 2022 65  > OR = 50 mg/dL Final    Triglycerides 2022 99  <150 mg/dL Final    LDL Cholesterol 2022 102 (A) mg/dL (calc) Final    HDL/Cholesterol Ratio 2022 2.9  <5.0 (calc) Final    Non HDL Chol. (LDL+VLDL) 2022 121  <130 mg/dL (calc) Final    TSH w/reflex to FT4 2022 0.88  0.40 - 4.50 mIU/L Final       Past Medical History:   Diagnosis Date    High cholesterol     Hypertension      Social History     Socioeconomic History    Marital status:    Tobacco Use    Smoking status: Former Smoker     Packs/day: 1.00     Years: 26.00     Pack years: 26.00     Types: Cigarettes     Start date: 1975     Quit date: 5/10/2001     Years since quittin.8    Smokeless tobacco: Former User     Quit date: 3/10/2001    Tobacco comment: 03/10/2001-   Substance and Sexual Activity    Alcohol use: Not Currently     Alcohol/week: 0.0 standard drinks    Drug use: Never    Sexual activity: Not  Currently     Partners: Male     Comment: In menopause for several years     Past Surgical History:   Procedure Laterality Date    BREAST BIOPSY      EYE SURGERY  2019    Cataracts    LUMBAR FUSION Left 11/19/2019    Procedure: FUSION, SPINE, LUMBAR Procedure: Stg 1: Left L4-5 oblique interbody fusion (Interbody spacer, allograft) / Stg 2:L4-5 Posterior instrumentation;  Surgeon: Ramos Miller MD;  Location: [facility];  Service: Neurosurgery;  Laterality: Left;  FUSION, SPINE, LUMBAR  Procedure: Stg 1: Left L4-5 oblique interbody fusion (Interbody spacer, allograft) / Stg 2:L4-5 Posterior instrumentation  Surg    SPINE SURGERY  2019    Lumbar Fusion    TONSILLECTOMY      TUBAL LIGATION       Family History   Problem Relation Age of Onset    Breast cancer Mother     Hearing loss Mother         Since 7 months old    Hypertension Mother     Hearing loss Father         Since 3 years old    Hypertension Father        Review of patient's allergies indicates:   Allergen Reactions    Lisinopril      Other reaction(s): BETTE    Adhesive Rash       Current Outpatient Medications:     atomoxetine (STRATTERA) 18 MG capsule, Take 1 capsule (18 mg total) by mouth once daily., Disp: 30 capsule, Rfl: 3    atorvastatin (LIPITOR) 20 MG tablet, Take 1 tablet (20 mg total) by mouth once daily., Disp: 90 tablet, Rfl: 1    ciclopirox (PENLAC) 8 % Soln, Apply topically nightly., Disp: 6.6 mL, Rfl: 3    EScitalopram oxalate (LEXAPRO) 10 MG tablet, Take 1 tablet (10 mg total) by mouth once daily., Disp: 90 tablet, Rfl: 1    gabapentin (NEURONTIN) 300 MG capsule, Take 1 capsule (300 mg total) by mouth 3 (three) times daily., Disp: 90 capsule, Rfl: 11    losartan-hydrochlorothiazide 100-12.5 mg (HYZAAR) 100-12.5 mg Tab, Take 1 tablet by mouth once daily., Disp: 90 tablet, Rfl: 1    multivitamin (THERAGRAN) per tablet, Take 1 tablet by mouth once daily., Disp: , Rfl:     tiZANidine (ZANAFLEX) 4 MG tablet, TK 1 T PO Q 6 H  PRN, Disp: 90 tablet, Rfl: 11    vit A/vit C/vit E/zinc/copper (ICAPS AREDS ORAL), Take by mouth., Disp: , Rfl:     Review of Systems   Constitutional: Negative for chills, fever and unexpected weight change.   HENT: Negative for ear pain, rhinorrhea and sore throat.    Eyes: Negative for pain and visual disturbance.   Respiratory: Negative for cough and shortness of breath.    Cardiovascular: Negative for chest pain, palpitations and leg swelling.   Gastrointestinal: Negative for abdominal pain, diarrhea, nausea and vomiting.   Genitourinary: Negative for difficulty urinating, hematuria and vaginal bleeding.   Musculoskeletal: Negative for arthralgias.   Skin: Negative for rash.   Neurological: Negative for dizziness, weakness and headaches.   Psychiatric/Behavioral: Negative for agitation and sleep disturbance. The patient is not nervous/anxious.           Objective:      Vitals:    03/16/22 1339   BP: 136/86   Pulse: 76   Weight: 95.3 kg (210 lb)   Height: 5' (1.524 m)     Physical Exam  Vitals reviewed.   Constitutional:       General: She is not in acute distress.     Appearance: Normal appearance. She is well-developed. She is not ill-appearing.   HENT:      Head: Normocephalic.      Right Ear: External ear normal.      Left Ear: External ear normal.   Eyes:      Conjunctiva/sclera: Conjunctivae normal.      Pupils: Pupils are equal, round, and reactive to light.   Neck:      Vascular: No JVD.   Cardiovascular:      Rate and Rhythm: Normal rate and regular rhythm.      Heart sounds: No murmur heard.  Pulmonary:      Effort: Pulmonary effort is normal.      Breath sounds: Normal breath sounds.   Abdominal:      General: Bowel sounds are normal.      Palpations: Abdomen is soft.   Musculoskeletal:         General: No deformity.      Cervical back: Normal range of motion and neck supple.      Thoracic back: Tenderness present. Decreased range of motion.   Lymphadenopathy:      Cervical: No cervical adenopathy.    Skin:     General: Skin is warm and dry.      Findings: No rash.   Neurological:      Mental Status: She is alert and oriented to person, place, and time.      Gait: Gait normal.   Psychiatric:         Speech: Speech normal.         Behavior: Behavior normal.           Assessment:       1. Special screening for malignant neoplasms, colon    2. Hyperlipidemia, unspecified hyperlipidemia type    3. Spondylolisthesis of lumbar region    4. Hypertension, unspecified type    5. Depression, unspecified depression type    6. High risk medication use    7. Encounter for screening for malignant neoplasm of breast, unspecified screening modality    8. Muscle spasm    9. Attention deficit hyperactivity disorder (ADHD), combined type    10. Breast cancer screening by mammogram         Plan:       Special screening for malignant neoplasms, colon  -     Cologuard Screening (Multitarget Stool DNA); Future; Expected date: 03/16/2022    Hyperlipidemia, unspecified hyperlipidemia type  Comments:  Stable.  Request refills  Orders:  -     atorvastatin (LIPITOR) 20 MG tablet; Take 1 tablet (20 mg total) by mouth once daily.  Dispense: 90 tablet; Refill: 1  -     Lipid Panel; Future; Expected date: 03/16/2022    Spondylolisthesis of lumbar region  Comments:  She appears to be doing very well status post lumbar fusion with Dr. Miller. Requesting increase in gabapentin to tid dose.  Orders:  -     gabapentin (NEURONTIN) 300 MG capsule; Take 1 capsule (300 mg total) by mouth 3 (three) times daily.  Dispense: 90 capsule; Refill: 11    Hypertension, unspecified type  -     losartan-hydrochlorothiazide 100-12.5 mg (HYZAAR) 100-12.5 mg Tab; Take 1 tablet by mouth once daily.  Dispense: 90 tablet; Refill: 1    Depression, unspecified depression type  Comments:  try lexapro  Orders:  -     EScitalopram oxalate (LEXAPRO) 10 MG tablet; Take 1 tablet (10 mg total) by mouth once daily.  Dispense: 90 tablet; Refill: 1    High risk medication use  -      CBC Auto Differential; Future; Expected date: 03/16/2022  -     Comprehensive Metabolic Panel; Future; Expected date: 03/16/2022  -     Lipid Panel; Future; Expected date: 03/16/2022  -     TSH w/reflex to FT4; Future; Expected date: 03/16/2022  -     Microalbumin/Creatinine Ratio, Urine; Future; Expected date: 03/16/2022  -     Urinalysis, Reflex to Urine Culture Urine, Clean Catch; Future; Expected date: 03/16/2022    Encounter for screening for malignant neoplasm of breast, unspecified screening modality    Muscle spasm  -     tiZANidine (ZANAFLEX) 4 MG tablet; TK 1 T PO Q 6 H PRN  Dispense: 90 tablet; Refill: 11    Attention deficit hyperactivity disorder (ADHD), combined type  -     atomoxetine (STRATTERA) 18 MG capsule; Take 1 capsule (18 mg total) by mouth once daily.  Dispense: 30 capsule; Refill: 3    Breast cancer screening by mammogram  -     Mammo Digital Screening Bilat; Future; Expected date: 03/16/2022      Follow up in about 3 months (around 6/16/2022), or if symptoms worsen or fail to improve, for medication management.        3/21/2022 Kianna Rodrigues

## 2022-04-14 LAB — NONINV COLON CA DNA+OCC BLD SCRN STL QL: NEGATIVE

## 2022-05-09 ENCOUNTER — PATIENT MESSAGE (OUTPATIENT)
Dept: FAMILY MEDICINE | Facility: CLINIC | Age: 67
End: 2022-05-09

## 2022-05-20 ENCOUNTER — TELEPHONE (OUTPATIENT)
Dept: FAMILY MEDICINE | Facility: CLINIC | Age: 67
End: 2022-05-20

## 2022-05-20 NOTE — TELEPHONE ENCOUNTER
----- Message from Domenica Ferro sent at 5/20/2022 10:37 AM CDT -----  Regarding: removing order  We have made 3 attempts to contact this pt to  schedule their mammogram and have been unable to reach them therefore we are removing this from our work queue. We just wanted to make you aware of this in case you wanted to reach out to the patient.     Thanks,   [facility] Centralized Scheduling

## 2022-06-03 ENCOUNTER — PATIENT MESSAGE (OUTPATIENT)
Dept: FAMILY MEDICINE | Facility: CLINIC | Age: 67
End: 2022-06-03

## 2022-06-22 ENCOUNTER — OFFICE VISIT (OUTPATIENT)
Dept: FAMILY MEDICINE | Facility: CLINIC | Age: 67
End: 2022-06-22
Payer: MEDICARE

## 2022-06-22 VITALS
HEIGHT: 60 IN | BODY MASS INDEX: 41.43 KG/M2 | DIASTOLIC BLOOD PRESSURE: 60 MMHG | HEART RATE: 80 BPM | WEIGHT: 211 LBS | SYSTOLIC BLOOD PRESSURE: 112 MMHG

## 2022-06-22 DIAGNOSIS — E78.5 HYPERLIPIDEMIA, UNSPECIFIED HYPERLIPIDEMIA TYPE: ICD-10-CM

## 2022-06-22 DIAGNOSIS — F90.2 ATTENTION DEFICIT HYPERACTIVITY DISORDER (ADHD), COMBINED TYPE: ICD-10-CM

## 2022-06-22 DIAGNOSIS — F32.A DEPRESSION, UNSPECIFIED DEPRESSION TYPE: ICD-10-CM

## 2022-06-22 DIAGNOSIS — G89.29 CHRONIC MIDLINE LOW BACK PAIN WITHOUT SCIATICA: ICD-10-CM

## 2022-06-22 DIAGNOSIS — Z79.899 HIGH RISK MEDICATION USE: ICD-10-CM

## 2022-06-22 DIAGNOSIS — M54.50 CHRONIC MIDLINE LOW BACK PAIN WITHOUT SCIATICA: ICD-10-CM

## 2022-06-22 DIAGNOSIS — E03.9 HYPOTHYROIDISM, UNSPECIFIED TYPE: ICD-10-CM

## 2022-06-22 DIAGNOSIS — I10 HYPERTENSION, UNSPECIFIED TYPE: Primary | ICD-10-CM

## 2022-06-22 DIAGNOSIS — Z63.79 STRESSFUL LIFE EVENT AFFECTING FAMILY: ICD-10-CM

## 2022-06-22 PROCEDURE — 3078F DIAST BP <80 MM HG: CPT | Mod: CPTII,S$GLB,, | Performed by: NURSE PRACTITIONER

## 2022-06-22 PROCEDURE — 3074F PR MOST RECENT SYSTOLIC BLOOD PRESSURE < 130 MM HG: ICD-10-PCS | Mod: CPTII,S$GLB,, | Performed by: NURSE PRACTITIONER

## 2022-06-22 PROCEDURE — 3074F SYST BP LT 130 MM HG: CPT | Mod: CPTII,S$GLB,, | Performed by: NURSE PRACTITIONER

## 2022-06-22 PROCEDURE — 99214 OFFICE O/P EST MOD 30 MIN: CPT | Mod: S$GLB,,, | Performed by: NURSE PRACTITIONER

## 2022-06-22 PROCEDURE — 3008F PR BODY MASS INDEX (BMI) DOCUMENTED: ICD-10-PCS | Mod: CPTII,S$GLB,, | Performed by: NURSE PRACTITIONER

## 2022-06-22 PROCEDURE — 1160F RVW MEDS BY RX/DR IN RCRD: CPT | Mod: CPTII,S$GLB,, | Performed by: NURSE PRACTITIONER

## 2022-06-22 PROCEDURE — 3078F PR MOST RECENT DIASTOLIC BLOOD PRESSURE < 80 MM HG: ICD-10-PCS | Mod: CPTII,S$GLB,, | Performed by: NURSE PRACTITIONER

## 2022-06-22 PROCEDURE — 99214 PR OFFICE/OUTPT VISIT, EST, LEVL IV, 30-39 MIN: ICD-10-PCS | Mod: S$GLB,,, | Performed by: NURSE PRACTITIONER

## 2022-06-22 PROCEDURE — 1159F MED LIST DOCD IN RCRD: CPT | Mod: CPTII,S$GLB,, | Performed by: NURSE PRACTITIONER

## 2022-06-22 PROCEDURE — 1160F PR REVIEW ALL MEDS BY PRESCRIBER/CLIN PHARMACIST DOCUMENTED: ICD-10-PCS | Mod: CPTII,S$GLB,, | Performed by: NURSE PRACTITIONER

## 2022-06-22 PROCEDURE — 1159F PR MEDICATION LIST DOCUMENTED IN MEDICAL RECORD: ICD-10-PCS | Mod: CPTII,S$GLB,, | Performed by: NURSE PRACTITIONER

## 2022-06-22 PROCEDURE — 3008F BODY MASS INDEX DOCD: CPT | Mod: CPTII,S$GLB,, | Performed by: NURSE PRACTITIONER

## 2022-06-22 RX ORDER — ATOMOXETINE 18 MG/1
18 CAPSULE ORAL DAILY
Qty: 30 CAPSULE | Refills: 3 | Status: SHIPPED | OUTPATIENT
Start: 2022-06-22 | End: 2022-07-25 | Stop reason: SDUPTHER

## 2022-06-22 RX ORDER — ESCITALOPRAM OXALATE 10 MG/1
10 TABLET ORAL DAILY
Qty: 90 TABLET | Refills: 1 | Status: SHIPPED | OUTPATIENT
Start: 2022-06-22 | End: 2022-11-28 | Stop reason: SDUPTHER

## 2022-06-29 NOTE — PROGRESS NOTES
SUBJECTIVE:    Patient ID: Sandrita Kaisre is a 66 y.o. female.    Chief Complaint: Follow-up (3mth, brought list// SW)    66 year old presents for check  up. Treated for hypertension, hyperlipidemia, and osteoarthritis add. Taking meds as prescribed. still taking  straterra. Happy with dose. Seems to be helping to complete tasks.. stress has significantly improved. Lives with . She does have to take care of him due to declining memory.  Last labs were 3/16. Due for mammogram. Last dexa was 3/21.      Office Visit on 03/16/2022   Component Date Value Ref Range Status    Cologuard Result 04/08/2022 Negative  Negative Final    WBC 03/16/2022 6.7  3.8 - 10.8 Thousand/uL Final    RBC 03/16/2022 4.91  3.80 - 5.10 Million/uL Final    Hemoglobin 03/16/2022 13.8  11.7 - 15.5 g/dL Final    Hematocrit 03/16/2022 41.9  35.0 - 45.0 % Final    MCV 03/16/2022 85.3  80.0 - 100.0 fL Final    MCH 03/16/2022 28.1  27.0 - 33.0 pg Final    MCHC 03/16/2022 32.9  32.0 - 36.0 g/dL Final    RDW 03/16/2022 14.3  11.0 - 15.0 % Final    Platelets 03/16/2022 318  140 - 400 Thousand/uL Final    MPV 03/16/2022 9.3  7.5 - 12.5 fL Final    Neutrophils, Abs 03/16/2022 3,397  1,500 - 7,800 cells/uL Final    Lymph # 03/16/2022 2,613  850 - 3,900 cells/uL Final    Mono # 03/16/2022 496  200 - 950 cells/uL Final    Eos # 03/16/2022 168  15 - 500 cells/uL Final    Baso # 03/16/2022 27  0 - 200 cells/uL Final    Neutrophils Relative 03/16/2022 50.7  % Final    Lymph % 03/16/2022 39.0  % Final    Mono % 03/16/2022 7.4  % Final    Eosinophil % 03/16/2022 2.5  % Final    Basophil % 03/16/2022 0.4  % Final    Glucose 03/16/2022 87  65 - 99 mg/dL Final    BUN 03/16/2022 15  7 - 25 mg/dL Final    Creatinine 03/16/2022 0.99  0.50 - 0.99 mg/dL Final    eGFR if non  03/16/2022 59 (A) > OR = 60 mL/min/1.73m2 Final    eGFR if  03/16/2022 68  > OR = 60 mL/min/1.73m2 Final    BUN/Creatinine Ratio  2022 NOT APPLICABLE   (calc) Final    Sodium 2022 139  135 - 146 mmol/L Final    Potassium 2022 4.3  3.5 - 5.3 mmol/L Final    Chloride 2022 102  98 - 110 mmol/L Final    CO2 2022 29  20 - 32 mmol/L Final    Calcium 2022 9.7  8.6 - 10.4 mg/dL Final    Total Protein 2022 7.3  6.1 - 8.1 g/dL Final    Albumin 2022 4.3  3.6 - 5.1 g/dL Final    Globulin, Total 2022 3.0  1.9 - 3.7 g/dL (calc) Final    Albumin/Globulin Ratio 2022 1.4  1.0 - 2.5 (calc) Final    Total Bilirubin 2022 0.6  0.2 - 1.2 mg/dL Final    Alkaline Phosphatase 2022 80  37 - 153 U/L Final    AST 2022 15  10 - 35 U/L Final    ALT 2022 14  6 - 29 U/L Final    Cholesterol 2022 186  <200 mg/dL Final    HDL 2022 65  > OR = 50 mg/dL Final    Triglycerides 2022 99  <150 mg/dL Final    LDL Cholesterol 2022 102 (A) mg/dL (calc) Final    HDL/Cholesterol Ratio 2022 2.9  <5.0 (calc) Final    Non HDL Chol. (LDL+VLDL) 2022 121  <130 mg/dL (calc) Final    TSH w/reflex to FT4 2022 0.88  0.40 - 4.50 mIU/L Final       Past Medical History:   Diagnosis Date    High cholesterol     Hypertension      Social History     Socioeconomic History    Marital status:    Tobacco Use    Smoking status: Former Smoker     Packs/day: 1.00     Years: 26.00     Pack years: 26.00     Types: Cigarettes     Start date: 1975     Quit date: 5/10/2001     Years since quittin.1    Smokeless tobacco: Former User     Quit date: 3/10/2001    Tobacco comment: 03/10/2001-   Substance and Sexual Activity    Alcohol use: Not Currently     Alcohol/week: 0.0 standard drinks    Drug use: Never    Sexual activity: Not Currently     Partners: Male     Comment: In menopause for several years     Past Surgical History:   Procedure Laterality Date    BREAST BIOPSY      EYE SURGERY  2019    Cataracts    LUMBAR FUSION Left 2019     Procedure: FUSION, SPINE, LUMBAR Procedure: Stg 1: Left L4-5 oblique interbody fusion (Interbody spacer, allograft) / Stg 2:L4-5 Posterior instrumentation;  Surgeon: Ramos Miller MD;  Location: [facility] OR;  Service: Neurosurgery;  Laterality: Left;  FUSION, SPINE, LUMBAR  Procedure: Stg 1: Left L4-5 oblique interbody fusion (Interbody spacer, allograft) / Stg 2:L4-5 Posterior instrumentation  Surg    SPINE SURGERY  2019    Lumbar Fusion    TONSILLECTOMY      TUBAL LIGATION       Family History   Problem Relation Age of Onset    Breast cancer Mother     Hearing loss Mother         Since 7 months old    Hypertension Mother     Hearing loss Father         Since 3 years old    Hypertension Father        Review of patient's allergies indicates:   Allergen Reactions    Lisinopril      Other reaction(s): BETTE    Adhesive Rash       Current Outpatient Medications:     atorvastatin (LIPITOR) 20 MG tablet, Take 1 tablet (20 mg total) by mouth once daily., Disp: 90 tablet, Rfl: 1    ciclopirox (PENLAC) 8 % Soln, Apply topically nightly., Disp: 6.6 mL, Rfl: 3    gabapentin (NEURONTIN) 300 MG capsule, Take 1 capsule (300 mg total) by mouth 3 (three) times daily., Disp: 90 capsule, Rfl: 11    losartan-hydrochlorothiazide 100-12.5 mg (HYZAAR) 100-12.5 mg Tab, Take 1 tablet by mouth once daily., Disp: 90 tablet, Rfl: 1    multivitamin (THERAGRAN) per tablet, Take 1 tablet by mouth once daily., Disp: , Rfl:     tiZANidine (ZANAFLEX) 4 MG tablet, TK 1 T PO Q 6 H PRN, Disp: 90 tablet, Rfl: 11    vit A/vit C/vit E/zinc/copper (ICAPS AREDS ORAL), Take by mouth., Disp: , Rfl:     atomoxetine (STRATTERA) 18 MG capsule, Take 1 capsule (18 mg total) by mouth once daily., Disp: 30 capsule, Rfl: 3    EScitalopram oxalate (LEXAPRO) 10 MG tablet, Take 1 tablet (10 mg total) by mouth once daily., Disp: 90 tablet, Rfl: 1    Review of Systems   Constitutional: Negative for chills, fever and unexpected weight change.   HENT:  Negative for ear pain, rhinorrhea and sore throat.    Eyes: Negative for pain and visual disturbance.   Respiratory: Negative for cough and shortness of breath.    Cardiovascular: Negative for chest pain, palpitations and leg swelling.   Gastrointestinal: Negative for abdominal pain, diarrhea, nausea and vomiting.   Genitourinary: Negative for difficulty urinating, hematuria and vaginal bleeding.   Musculoskeletal: Negative for arthralgias.   Skin: Negative for rash.   Neurological: Negative for dizziness, weakness and headaches.   Psychiatric/Behavioral: Negative for agitation and sleep disturbance. The patient is not nervous/anxious.           Objective:      Vitals:    06/22/22 1327   BP: 112/60   Pulse: 80   Weight: 95.7 kg (211 lb)   Height: 5' (1.524 m)     Physical Exam  Constitutional:       General: She is not in acute distress.     Appearance: She is well-developed. She is not ill-appearing.   HENT:      Right Ear: External ear normal.      Left Ear: External ear normal.   Eyes:      Conjunctiva/sclera: Conjunctivae normal.      Pupils: Pupils are equal, round, and reactive to light.   Neck:      Vascular: No JVD.   Cardiovascular:      Rate and Rhythm: Normal rate and regular rhythm.      Heart sounds: No murmur heard.  Pulmonary:      Effort: Pulmonary effort is normal.      Breath sounds: Normal breath sounds.   Abdominal:      General: Bowel sounds are normal.      Palpations: Abdomen is soft.   Musculoskeletal:         General: No deformity. Normal range of motion.      Cervical back: Normal range of motion and neck supple.   Lymphadenopathy:      Cervical: No cervical adenopathy.   Skin:     General: Skin is warm and dry.      Findings: No rash.   Neurological:      Mental Status: She is alert and oriented to person, place, and time.      Gait: Gait normal.   Psychiatric:         Speech: Speech normal.         Behavior: Behavior normal.           Assessment:       1. Hypertension, unspecified type     2. Depression, unspecified depression type    3. Attention deficit hyperactivity disorder (ADHD), combined type    4. Hyperlipidemia, unspecified hyperlipidemia type    5. High risk medication use    6. Stressful life event affecting family    7. Hypothyroidism, unspecified type    8. Chronic midline low back pain without sciatica         Plan:       Hypertension, unspecified type    Depression, unspecified depression type  Comments:  try lexapro  Orders:  -     EScitalopram oxalate (LEXAPRO) 10 MG tablet; Take 1 tablet (10 mg total) by mouth once daily.  Dispense: 90 tablet; Refill: 1    Attention deficit hyperactivity disorder (ADHD), combined type  -     atomoxetine (STRATTERA) 18 MG capsule; Take 1 capsule (18 mg total) by mouth once daily.  Dispense: 30 capsule; Refill: 3    Hyperlipidemia, unspecified hyperlipidemia type    High risk medication use    Stressful life event affecting family    Hypothyroidism, unspecified type    Chronic midline low back pain without sciatica      Follow up in about 3 months (around 9/22/2022), or if symptoms worsen or fail to improve, for medication management.        6/29/2022 Kianna Rodrigues

## 2022-07-17 ENCOUNTER — PATIENT MESSAGE (OUTPATIENT)
Dept: FAMILY MEDICINE | Facility: CLINIC | Age: 67
End: 2022-07-17

## 2022-07-24 ENCOUNTER — PATIENT MESSAGE (OUTPATIENT)
Dept: FAMILY MEDICINE | Facility: CLINIC | Age: 67
End: 2022-07-24

## 2022-07-24 DIAGNOSIS — F90.2 ATTENTION DEFICIT HYPERACTIVITY DISORDER (ADHD), COMBINED TYPE: ICD-10-CM

## 2022-07-25 RX ORDER — ATOMOXETINE 18 MG/1
18 CAPSULE ORAL DAILY
Qty: 30 CAPSULE | Refills: 3 | Status: SHIPPED | OUTPATIENT
Start: 2022-07-25 | End: 2023-01-27 | Stop reason: SDUPTHER

## 2022-09-14 ENCOUNTER — OFFICE VISIT (OUTPATIENT)
Dept: FAMILY MEDICINE | Facility: CLINIC | Age: 67
End: 2022-09-14
Payer: MEDICARE

## 2022-09-14 VITALS
DIASTOLIC BLOOD PRESSURE: 76 MMHG | WEIGHT: 219 LBS | SYSTOLIC BLOOD PRESSURE: 130 MMHG | HEART RATE: 64 BPM | HEIGHT: 65 IN | BODY MASS INDEX: 36.49 KG/M2

## 2022-09-14 DIAGNOSIS — Z79.899 HIGH RISK MEDICATION USE: Primary | ICD-10-CM

## 2022-09-14 DIAGNOSIS — I10 HYPERTENSION, UNSPECIFIED TYPE: ICD-10-CM

## 2022-09-14 DIAGNOSIS — F41.9 ANXIETY: ICD-10-CM

## 2022-09-14 DIAGNOSIS — N18.31 CHRONIC KIDNEY DISEASE, STAGE 3A: ICD-10-CM

## 2022-09-14 DIAGNOSIS — E66.01 SEVERE OBESITY (BMI 35.0-39.9) WITH COMORBIDITY: ICD-10-CM

## 2022-09-14 DIAGNOSIS — E78.5 HYPERLIPIDEMIA, UNSPECIFIED HYPERLIPIDEMIA TYPE: ICD-10-CM

## 2022-09-14 DIAGNOSIS — F32.A DEPRESSION, UNSPECIFIED DEPRESSION TYPE: ICD-10-CM

## 2022-09-14 PROCEDURE — 3078F PR MOST RECENT DIASTOLIC BLOOD PRESSURE < 80 MM HG: ICD-10-PCS | Mod: CPTII,S$GLB,, | Performed by: NURSE PRACTITIONER

## 2022-09-14 PROCEDURE — 3075F SYST BP GE 130 - 139MM HG: CPT | Mod: CPTII,S$GLB,, | Performed by: NURSE PRACTITIONER

## 2022-09-14 PROCEDURE — 3078F DIAST BP <80 MM HG: CPT | Mod: CPTII,S$GLB,, | Performed by: NURSE PRACTITIONER

## 2022-09-14 PROCEDURE — 99214 PR OFFICE/OUTPT VISIT, EST, LEVL IV, 30-39 MIN: ICD-10-PCS | Mod: S$GLB,,, | Performed by: NURSE PRACTITIONER

## 2022-09-14 PROCEDURE — 1160F PR REVIEW ALL MEDS BY PRESCRIBER/CLIN PHARMACIST DOCUMENTED: ICD-10-PCS | Mod: CPTII,S$GLB,, | Performed by: NURSE PRACTITIONER

## 2022-09-14 PROCEDURE — 1160F RVW MEDS BY RX/DR IN RCRD: CPT | Mod: CPTII,S$GLB,, | Performed by: NURSE PRACTITIONER

## 2022-09-14 PROCEDURE — 3008F BODY MASS INDEX DOCD: CPT | Mod: CPTII,S$GLB,, | Performed by: NURSE PRACTITIONER

## 2022-09-14 PROCEDURE — 3075F PR MOST RECENT SYSTOLIC BLOOD PRESS GE 130-139MM HG: ICD-10-PCS | Mod: CPTII,S$GLB,, | Performed by: NURSE PRACTITIONER

## 2022-09-14 PROCEDURE — 1159F MED LIST DOCD IN RCRD: CPT | Mod: CPTII,S$GLB,, | Performed by: NURSE PRACTITIONER

## 2022-09-14 PROCEDURE — 99214 OFFICE O/P EST MOD 30 MIN: CPT | Mod: S$GLB,,, | Performed by: NURSE PRACTITIONER

## 2022-09-14 PROCEDURE — 3008F PR BODY MASS INDEX (BMI) DOCUMENTED: ICD-10-PCS | Mod: CPTII,S$GLB,, | Performed by: NURSE PRACTITIONER

## 2022-09-14 PROCEDURE — 1159F PR MEDICATION LIST DOCUMENTED IN MEDICAL RECORD: ICD-10-PCS | Mod: CPTII,S$GLB,, | Performed by: NURSE PRACTITIONER

## 2022-09-15 LAB
ALBUMIN SERPL-MCNC: 4.3 G/DL (ref 3.6–5.1)
ALBUMIN/GLOB SERPL: 1.5 (CALC) (ref 1–2.5)
ALP SERPL-CCNC: 92 U/L (ref 37–153)
ALT SERPL-CCNC: 14 U/L (ref 6–29)
AST SERPL-CCNC: 13 U/L (ref 10–35)
BILIRUB SERPL-MCNC: 0.5 MG/DL (ref 0.2–1.2)
BUN SERPL-MCNC: 15 MG/DL (ref 7–25)
BUN/CREAT SERPL: NORMAL (CALC) (ref 6–22)
CALCIUM SERPL-MCNC: 9.4 MG/DL (ref 8.6–10.4)
CHLORIDE SERPL-SCNC: 102 MMOL/L (ref 98–110)
CHOLEST SERPL-MCNC: 191 MG/DL
CHOLEST/HDLC SERPL: 3.1 (CALC)
CO2 SERPL-SCNC: 31 MMOL/L (ref 20–32)
CREAT SERPL-MCNC: 0.92 MG/DL (ref 0.5–1.05)
EGFR: 68 ML/MIN/1.73M2
GLOBULIN SER CALC-MCNC: 2.8 G/DL (CALC) (ref 1.9–3.7)
GLUCOSE SERPL-MCNC: 88 MG/DL (ref 65–99)
HDLC SERPL-MCNC: 62 MG/DL
LDLC SERPL CALC-MCNC: 110 MG/DL (CALC)
NONHDLC SERPL-MCNC: 129 MG/DL (CALC)
POTASSIUM SERPL-SCNC: 4.3 MMOL/L (ref 3.5–5.3)
PROT SERPL-MCNC: 7.1 G/DL (ref 6.1–8.1)
SODIUM SERPL-SCNC: 139 MMOL/L (ref 135–146)
TRIGL SERPL-MCNC: 92 MG/DL

## 2022-09-25 PROBLEM — N18.31 CHRONIC KIDNEY DISEASE, STAGE 3A: Status: ACTIVE | Noted: 2022-09-25

## 2022-09-25 PROBLEM — E66.01 SEVERE OBESITY (BMI 35.0-39.9) WITH COMORBIDITY: Status: ACTIVE | Noted: 2022-09-25

## 2022-09-26 ENCOUNTER — TELEPHONE (OUTPATIENT)
Dept: FAMILY MEDICINE | Facility: CLINIC | Age: 67
End: 2022-09-26
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
## 2022-09-26 NOTE — TELEPHONE ENCOUNTER
----- Message from Kianna Rodrigues NP sent at 9/25/2022 11:31 PM CDT -----  Labs look fine. Continue as is.

## 2022-10-03 ENCOUNTER — PATIENT MESSAGE (OUTPATIENT)
Dept: FAMILY MEDICINE | Facility: CLINIC | Age: 67
End: 2022-10-03

## 2022-10-04 ENCOUNTER — PATIENT MESSAGE (OUTPATIENT)
Dept: FAMILY MEDICINE | Facility: CLINIC | Age: 67
End: 2022-10-04

## 2022-10-04 NOTE — LETTER
[address] [facility] [address]  [place] [address]  Phone: [phone]   [phone]                        MD Ivett Branham MD Chequita Williams, MD Matthew Bassett, PA-C Allison Hoffritz, NP Linda Melerine, NP Jodi Powell, NP      Date: 10/05/2022        Patient: Sandrita Kaiser  YOB: 1955      To whom it may concern,      Sandrita Kaiser is currently being treated for anxiety and depression. Please contact the office if you have any questions.         Sincerely,     Rosalie Casper LPN      Electronically Signed By: Kianna Rodrigues NP

## 2022-11-04 ENCOUNTER — PATIENT MESSAGE (OUTPATIENT)
Dept: FAMILY MEDICINE | Facility: CLINIC | Age: 67
End: 2022-11-04

## 2022-11-28 ENCOUNTER — PATIENT MESSAGE (OUTPATIENT)
Dept: FAMILY MEDICINE | Facility: CLINIC | Age: 67
End: 2022-11-28

## 2022-11-28 DIAGNOSIS — F32.A DEPRESSION, UNSPECIFIED DEPRESSION TYPE: ICD-10-CM

## 2022-11-28 RX ORDER — ESCITALOPRAM OXALATE 10 MG/1
10 TABLET ORAL DAILY
Qty: 90 TABLET | Refills: 1 | Status: SHIPPED | OUTPATIENT
Start: 2022-11-28 | End: 2023-03-14 | Stop reason: SDUPTHER

## 2023-01-26 ENCOUNTER — PATIENT MESSAGE (OUTPATIENT)
Dept: FAMILY MEDICINE | Facility: CLINIC | Age: 68
End: 2023-01-26

## 2023-01-26 DIAGNOSIS — F90.2 ATTENTION DEFICIT HYPERACTIVITY DISORDER (ADHD), COMBINED TYPE: ICD-10-CM

## 2023-01-27 RX ORDER — ATOMOXETINE 18 MG/1
18 CAPSULE ORAL DAILY
Qty: 30 CAPSULE | Refills: 2 | Status: SHIPPED | OUTPATIENT
Start: 2023-01-27 | End: 2023-03-14

## 2023-03-02 ENCOUNTER — TELEPHONE (OUTPATIENT)
Dept: FAMILY MEDICINE | Facility: CLINIC | Age: 68
End: 2023-03-02

## 2023-03-02 DIAGNOSIS — E78.5 HYPERLIPIDEMIA, UNSPECIFIED HYPERLIPIDEMIA TYPE: ICD-10-CM

## 2023-03-02 DIAGNOSIS — I10 HYPERTENSION, UNSPECIFIED TYPE: ICD-10-CM

## 2023-03-02 DIAGNOSIS — Z79.899 ENCOUNTER FOR LONG-TERM (CURRENT) USE OF OTHER MEDICATIONS: ICD-10-CM

## 2023-03-02 DIAGNOSIS — E03.9 HYPOTHYROIDISM, UNSPECIFIED TYPE: ICD-10-CM

## 2023-03-02 DIAGNOSIS — Z79.899 HIGH RISK MEDICATION USE: Primary | ICD-10-CM

## 2023-03-07 ENCOUNTER — PATIENT MESSAGE (OUTPATIENT)
Dept: FAMILY MEDICINE | Facility: CLINIC | Age: 68
End: 2023-03-07

## 2023-03-07 DIAGNOSIS — E78.5 HYPERLIPIDEMIA, UNSPECIFIED HYPERLIPIDEMIA TYPE: ICD-10-CM

## 2023-03-07 DIAGNOSIS — I10 HYPERTENSION, UNSPECIFIED TYPE: ICD-10-CM

## 2023-03-07 RX ORDER — ATORVASTATIN CALCIUM 20 MG/1
20 TABLET, FILM COATED ORAL DAILY
Qty: 90 TABLET | Refills: 1 | Status: SHIPPED | OUTPATIENT
Start: 2023-03-07 | End: 2023-09-14 | Stop reason: SDUPTHER

## 2023-03-07 RX ORDER — LOSARTAN POTASSIUM AND HYDROCHLOROTHIAZIDE 12.5; 1 MG/1; MG/1
1 TABLET ORAL DAILY
Qty: 90 TABLET | Refills: 1 | Status: SHIPPED | OUTPATIENT
Start: 2023-03-07 | End: 2023-09-15 | Stop reason: SDUPTHER

## 2023-03-14 ENCOUNTER — OFFICE VISIT (OUTPATIENT)
Dept: FAMILY MEDICINE | Facility: CLINIC | Age: 68
End: 2023-03-14
Payer: MEDICARE

## 2023-03-14 VITALS
HEIGHT: 65 IN | DIASTOLIC BLOOD PRESSURE: 72 MMHG | BODY MASS INDEX: 37.32 KG/M2 | WEIGHT: 224 LBS | SYSTOLIC BLOOD PRESSURE: 130 MMHG | HEART RATE: 68 BPM

## 2023-03-14 DIAGNOSIS — N18.31 CHRONIC KIDNEY DISEASE, STAGE 3A: ICD-10-CM

## 2023-03-14 DIAGNOSIS — Z12.31 ENCOUNTER FOR SCREENING MAMMOGRAM FOR MALIGNANT NEOPLASM OF BREAST: Primary | ICD-10-CM

## 2023-03-14 DIAGNOSIS — L98.9 FACIAL LESION: ICD-10-CM

## 2023-03-14 DIAGNOSIS — Z23 NEED FOR PNEUMOCOCCAL VACCINATION: ICD-10-CM

## 2023-03-14 DIAGNOSIS — F32.A DEPRESSION, UNSPECIFIED DEPRESSION TYPE: ICD-10-CM

## 2023-03-14 DIAGNOSIS — E66.01 SEVERE OBESITY (BMI 35.0-39.9) WITH COMORBIDITY: ICD-10-CM

## 2023-03-14 DIAGNOSIS — I10 HYPERTENSION, UNSPECIFIED TYPE: ICD-10-CM

## 2023-03-14 DIAGNOSIS — M43.16 SPONDYLOLISTHESIS OF LUMBAR REGION: ICD-10-CM

## 2023-03-14 DIAGNOSIS — E78.5 HYPERLIPIDEMIA, UNSPECIFIED HYPERLIPIDEMIA TYPE: ICD-10-CM

## 2023-03-14 DIAGNOSIS — F90.2 ATTENTION DEFICIT HYPERACTIVITY DISORDER (ADHD), COMBINED TYPE: ICD-10-CM

## 2023-03-14 PROCEDURE — 90677 PNEUMOCOCCAL CONJUGATE VACCINE 20-VALENT: ICD-10-PCS | Mod: S$GLB,,, | Performed by: NURSE PRACTITIONER

## 2023-03-14 PROCEDURE — 90677 PCV20 VACCINE IM: CPT | Mod: S$GLB,,, | Performed by: NURSE PRACTITIONER

## 2023-03-14 PROCEDURE — 3288F PR FALLS RISK ASSESSMENT DOCUMENTED: ICD-10-PCS | Mod: CPTII,S$GLB,, | Performed by: NURSE PRACTITIONER

## 2023-03-14 PROCEDURE — G0009 PNEUMOCOCCAL CONJUGATE VACCINE 20-VALENT: ICD-10-PCS | Mod: S$GLB,,, | Performed by: NURSE PRACTITIONER

## 2023-03-14 PROCEDURE — 1101F PT FALLS ASSESS-DOCD LE1/YR: CPT | Mod: CPTII,S$GLB,, | Performed by: NURSE PRACTITIONER

## 2023-03-14 PROCEDURE — 99214 PR OFFICE/OUTPT VISIT, EST, LEVL IV, 30-39 MIN: ICD-10-PCS | Mod: S$GLB,,, | Performed by: NURSE PRACTITIONER

## 2023-03-14 PROCEDURE — 1159F PR MEDICATION LIST DOCUMENTED IN MEDICAL RECORD: ICD-10-PCS | Mod: CPTII,S$GLB,, | Performed by: NURSE PRACTITIONER

## 2023-03-14 PROCEDURE — 3008F PR BODY MASS INDEX (BMI) DOCUMENTED: ICD-10-PCS | Mod: CPTII,S$GLB,, | Performed by: NURSE PRACTITIONER

## 2023-03-14 PROCEDURE — 3008F BODY MASS INDEX DOCD: CPT | Mod: CPTII,S$GLB,, | Performed by: NURSE PRACTITIONER

## 2023-03-14 PROCEDURE — 3078F DIAST BP <80 MM HG: CPT | Mod: CPTII,S$GLB,, | Performed by: NURSE PRACTITIONER

## 2023-03-14 PROCEDURE — 3075F PR MOST RECENT SYSTOLIC BLOOD PRESS GE 130-139MM HG: ICD-10-PCS | Mod: CPTII,S$GLB,, | Performed by: NURSE PRACTITIONER

## 2023-03-14 PROCEDURE — 99214 OFFICE O/P EST MOD 30 MIN: CPT | Mod: S$GLB,,, | Performed by: NURSE PRACTITIONER

## 2023-03-14 PROCEDURE — 1101F PR PT FALLS ASSESS DOC 0-1 FALLS W/OUT INJ PAST YR: ICD-10-PCS | Mod: CPTII,S$GLB,, | Performed by: NURSE PRACTITIONER

## 2023-03-14 PROCEDURE — 3075F SYST BP GE 130 - 139MM HG: CPT | Mod: CPTII,S$GLB,, | Performed by: NURSE PRACTITIONER

## 2023-03-14 PROCEDURE — 1159F MED LIST DOCD IN RCRD: CPT | Mod: CPTII,S$GLB,, | Performed by: NURSE PRACTITIONER

## 2023-03-14 PROCEDURE — 3078F PR MOST RECENT DIASTOLIC BLOOD PRESSURE < 80 MM HG: ICD-10-PCS | Mod: CPTII,S$GLB,, | Performed by: NURSE PRACTITIONER

## 2023-03-14 PROCEDURE — G0009 ADMIN PNEUMOCOCCAL VACCINE: HCPCS | Mod: S$GLB,,, | Performed by: NURSE PRACTITIONER

## 2023-03-14 PROCEDURE — 1160F PR REVIEW ALL MEDS BY PRESCRIBER/CLIN PHARMACIST DOCUMENTED: ICD-10-PCS | Mod: CPTII,S$GLB,, | Performed by: NURSE PRACTITIONER

## 2023-03-14 PROCEDURE — 3288F FALL RISK ASSESSMENT DOCD: CPT | Mod: CPTII,S$GLB,, | Performed by: NURSE PRACTITIONER

## 2023-03-14 PROCEDURE — 1160F RVW MEDS BY RX/DR IN RCRD: CPT | Mod: CPTII,S$GLB,, | Performed by: NURSE PRACTITIONER

## 2023-03-14 RX ORDER — ATOMOXETINE 25 MG/1
25 CAPSULE ORAL DAILY
Qty: 30 CAPSULE | Refills: 2 | Status: SHIPPED | OUTPATIENT
Start: 2023-03-14 | End: 2023-06-13 | Stop reason: SDUPTHER

## 2023-03-14 RX ORDER — ATOMOXETINE 25 MG/1
25 CAPSULE ORAL DAILY
Qty: 30 CAPSULE | Refills: 2 | Status: SHIPPED | OUTPATIENT
Start: 2023-03-14 | End: 2023-03-14 | Stop reason: SDUPTHER

## 2023-03-14 RX ORDER — ESCITALOPRAM OXALATE 20 MG/1
20 TABLET ORAL DAILY
Qty: 90 TABLET | Refills: 1 | Status: SHIPPED | OUTPATIENT
Start: 2023-03-14 | End: 2023-03-14 | Stop reason: SDUPTHER

## 2023-03-14 RX ORDER — GABAPENTIN 300 MG/1
300 CAPSULE ORAL 3 TIMES DAILY
Qty: 90 CAPSULE | Refills: 11 | Status: SHIPPED | OUTPATIENT
Start: 2023-03-14 | End: 2024-02-16 | Stop reason: SDUPTHER

## 2023-03-14 RX ORDER — ESCITALOPRAM OXALATE 20 MG/1
20 TABLET ORAL DAILY
Qty: 90 TABLET | Refills: 1 | Status: SHIPPED | OUTPATIENT
Start: 2023-03-14 | End: 2023-09-14 | Stop reason: SDUPTHER
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
## 2023-03-15 ENCOUNTER — TELEPHONE (OUTPATIENT)
Dept: DERMATOLOGY | Facility: CLINIC | Age: 68
End: 2023-03-15
Payer: MEDICARE

## 2023-03-15 LAB
ALBUMIN SERPL-MCNC: 4.4 G/DL (ref 3.6–5.1)
ALBUMIN/GLOB SERPL: 1.5 (CALC) (ref 1–2.5)
ALP SERPL-CCNC: 96 U/L (ref 37–153)
ALT SERPL-CCNC: 15 U/L (ref 6–29)
AST SERPL-CCNC: 18 U/L (ref 10–35)
BASOPHILS # BLD AUTO: 38 CELLS/UL (ref 0–200)
BASOPHILS NFR BLD AUTO: 0.5 %
BILIRUB SERPL-MCNC: 0.7 MG/DL (ref 0.2–1.2)
BUN SERPL-MCNC: 15 MG/DL (ref 7–25)
BUN/CREAT SERPL: ABNORMAL (CALC) (ref 6–22)
CALCIUM SERPL-MCNC: 9.7 MG/DL (ref 8.6–10.4)
CHLORIDE SERPL-SCNC: 96 MMOL/L (ref 98–110)
CHOLEST SERPL-MCNC: 191 MG/DL
CHOLEST/HDLC SERPL: 3.5 (CALC)
CO2 SERPL-SCNC: 31 MMOL/L (ref 20–32)
CREAT SERPL-MCNC: 1.04 MG/DL (ref 0.5–1.05)
EGFR: 58 ML/MIN/1.73M2
EOSINOPHIL # BLD AUTO: 129 CELLS/UL (ref 15–500)
EOSINOPHIL NFR BLD AUTO: 1.7 %
ERYTHROCYTE [DISTWIDTH] IN BLOOD BY AUTOMATED COUNT: 13.7 % (ref 11–15)
GLOBULIN SER CALC-MCNC: 3 G/DL (CALC) (ref 1.9–3.7)
GLUCOSE SERPL-MCNC: 95 MG/DL (ref 65–99)
HCT VFR BLD AUTO: 42.2 % (ref 35–45)
HDLC SERPL-MCNC: 54 MG/DL
HGB BLD-MCNC: 14.2 G/DL (ref 11.7–15.5)
LDLC SERPL CALC-MCNC: 111 MG/DL (CALC)
LYMPHOCYTES # BLD AUTO: 2432 CELLS/UL (ref 850–3900)
LYMPHOCYTES NFR BLD AUTO: 32 %
MCH RBC QN AUTO: 28.5 PG (ref 27–33)
MCHC RBC AUTO-ENTMCNC: 33.6 G/DL (ref 32–36)
MCV RBC AUTO: 84.6 FL (ref 80–100)
MONOCYTES # BLD AUTO: 410 CELLS/UL (ref 200–950)
MONOCYTES NFR BLD AUTO: 5.4 %
NEUTROPHILS # BLD AUTO: 4590 CELLS/UL (ref 1500–7800)
NEUTROPHILS NFR BLD AUTO: 60.4 %
NONHDLC SERPL-MCNC: 137 MG/DL (CALC)
PLATELET # BLD AUTO: 342 THOUSAND/UL (ref 140–400)
PMV BLD REES-ECKER: 8.9 FL (ref 7.5–12.5)
POTASSIUM SERPL-SCNC: 4.2 MMOL/L (ref 3.5–5.3)
PROT SERPL-MCNC: 7.4 G/DL (ref 6.1–8.1)
RBC # BLD AUTO: 4.99 MILLION/UL (ref 3.8–5.1)
SODIUM SERPL-SCNC: 138 MMOL/L (ref 135–146)
TRIGL SERPL-MCNC: 151 MG/DL
TSH SERPL-ACNC: 1.52 MIU/L (ref 0.4–4.5)
WBC # BLD AUTO: 7.6 THOUSAND/UL (ref 3.8–10.8)

## 2023-03-19 NOTE — PROGRESS NOTES
Call patient.  Sugar kidneys liver and thyroid all normal.  Cholesterol excellent at 191 CBC shows no anemia.  Continue current medications

## 2023-03-20 ENCOUNTER — TELEPHONE (OUTPATIENT)
Dept: FAMILY MEDICINE | Facility: CLINIC | Age: 68
End: 2023-03-20

## 2023-03-27 ENCOUNTER — TELEPHONE (OUTPATIENT)
Dept: FAMILY MEDICINE | Facility: CLINIC | Age: 68
End: 2023-03-27

## 2023-03-27 NOTE — TELEPHONE ENCOUNTER
----- Message from Reena Sprague sent at 3/27/2023  1:51 PM CDT -----  Regarding: Unable to contact  We have made multiple attempts to contact this pt to  schedule their Screening Mammo and have been unable to reach them therefore we are removing this from our work queue. We just wanted to make you aware of this in case you wanted to reach out to the patient.     Thanks,   [facility] Centralized Scheduling

## 2023-03-28 ENCOUNTER — PATIENT MESSAGE (OUTPATIENT)
Dept: FAMILY MEDICINE | Facility: CLINIC | Age: 68
End: 2023-03-28

## 2023-03-28 DIAGNOSIS — M43.16 SPONDYLOLISTHESIS OF LUMBAR REGION: ICD-10-CM

## 2023-05-11 ENCOUNTER — PATIENT MESSAGE (OUTPATIENT)
Dept: FAMILY MEDICINE | Facility: CLINIC | Age: 68
End: 2023-05-11

## 2023-05-11 DIAGNOSIS — M62.838 MUSCLE SPASM: ICD-10-CM

## 2023-05-11 RX ORDER — TIZANIDINE 4 MG/1
TABLET ORAL
Qty: 90 TABLET | Refills: 5 | Status: SHIPPED | OUTPATIENT
Start: 2023-05-11 | End: 2024-03-18 | Stop reason: SDUPTHER

## 2023-06-13 ENCOUNTER — PATIENT MESSAGE (OUTPATIENT)
Dept: FAMILY MEDICINE | Facility: CLINIC | Age: 68
End: 2023-06-13

## 2023-06-13 RX ORDER — ATOMOXETINE 25 MG/1
25 CAPSULE ORAL DAILY
Qty: 30 CAPSULE | Refills: 2 | Status: SHIPPED | OUTPATIENT
Start: 2023-06-13 | End: 2023-09-14 | Stop reason: SDUPTHER

## 2023-06-20 ENCOUNTER — OFFICE VISIT (OUTPATIENT)
Dept: DERMATOLOGY | Facility: CLINIC | Age: 68
End: 2023-06-20
Payer: MEDICARE

## 2023-06-20 VITALS — HEIGHT: 65 IN | BODY MASS INDEX: 37.32 KG/M2 | WEIGHT: 224 LBS

## 2023-06-20 DIAGNOSIS — L82.1 SEBORRHEIC KERATOSES: ICD-10-CM

## 2023-06-20 DIAGNOSIS — D48.5 NEOPLASM OF UNCERTAIN BEHAVIOR OF SKIN: Primary | ICD-10-CM

## 2023-06-20 DIAGNOSIS — D18.01 CHERRY ANGIOMA: ICD-10-CM

## 2023-06-20 DIAGNOSIS — L98.9 FACIAL LESION: ICD-10-CM

## 2023-06-20 DIAGNOSIS — D22.5: ICD-10-CM

## 2023-06-20 PROCEDURE — 1101F PT FALLS ASSESS-DOCD LE1/YR: CPT | Mod: CPTII,S$GLB,, | Performed by: DERMATOLOGY

## 2023-06-20 PROCEDURE — 88342 IMHCHEM/IMCYTCHM 1ST ANTB: CPT | Mod: 26,,, | Performed by: DERMATOLOGY

## 2023-06-20 PROCEDURE — 88342 CHG IMMUNOCYTOCHEMISTRY: ICD-10-PCS | Mod: 26,,, | Performed by: DERMATOLOGY

## 2023-06-20 PROCEDURE — 11102 PR TANGENTIAL BIOPSY, SKIN, SINGLE LESION: ICD-10-PCS | Mod: S$GLB,,, | Performed by: DERMATOLOGY

## 2023-06-20 PROCEDURE — 1160F RVW MEDS BY RX/DR IN RCRD: CPT | Mod: CPTII,S$GLB,, | Performed by: DERMATOLOGY

## 2023-06-20 PROCEDURE — 3008F BODY MASS INDEX DOCD: CPT | Mod: CPTII,S$GLB,, | Performed by: DERMATOLOGY

## 2023-06-20 PROCEDURE — 99203 OFFICE O/P NEW LOW 30 MIN: CPT | Mod: 25,S$GLB,, | Performed by: DERMATOLOGY

## 2023-06-20 PROCEDURE — 88305 TISSUE EXAM BY PATHOLOGIST: CPT | Mod: 26,,, | Performed by: DERMATOLOGY

## 2023-06-20 PROCEDURE — 1159F MED LIST DOCD IN RCRD: CPT | Mod: CPTII,S$GLB,, | Performed by: DERMATOLOGY

## 2023-06-20 PROCEDURE — 1160F PR REVIEW ALL MEDS BY PRESCRIBER/CLIN PHARMACIST DOCUMENTED: ICD-10-PCS | Mod: CPTII,S$GLB,, | Performed by: DERMATOLOGY

## 2023-06-20 PROCEDURE — 88341 PR IHC OR ICC EACH ADD'L SINGLE ANTIBODY  STAINPR: ICD-10-PCS | Mod: 26,,, | Performed by: DERMATOLOGY

## 2023-06-20 PROCEDURE — 88341 IMHCHEM/IMCYTCHM EA ADD ANTB: CPT | Performed by: DERMATOLOGY

## 2023-06-20 PROCEDURE — 88342 IMHCHEM/IMCYTCHM 1ST ANTB: CPT | Performed by: DERMATOLOGY

## 2023-06-20 PROCEDURE — 1126F AMNT PAIN NOTED NONE PRSNT: CPT | Mod: CPTII,S$GLB,, | Performed by: DERMATOLOGY

## 2023-06-20 PROCEDURE — 1126F PR PAIN SEVERITY QUANTIFIED, NO PAIN PRESENT: ICD-10-PCS | Mod: CPTII,S$GLB,, | Performed by: DERMATOLOGY

## 2023-06-20 PROCEDURE — 1101F PR PT FALLS ASSESS DOC 0-1 FALLS W/OUT INJ PAST YR: ICD-10-PCS | Mod: CPTII,S$GLB,, | Performed by: DERMATOLOGY

## 2023-06-20 PROCEDURE — 1159F PR MEDICATION LIST DOCUMENTED IN MEDICAL RECORD: ICD-10-PCS | Mod: CPTII,S$GLB,, | Performed by: DERMATOLOGY

## 2023-06-20 PROCEDURE — 99203 PR OFFICE/OUTPT VISIT, NEW, LEVL III, 30-44 MIN: ICD-10-PCS | Mod: 25,S$GLB,, | Performed by: DERMATOLOGY

## 2023-06-20 PROCEDURE — 88305 TISSUE EXAM BY PATHOLOGIST: ICD-10-PCS | Mod: 26,,, | Performed by: DERMATOLOGY

## 2023-06-20 PROCEDURE — 88305 TISSUE EXAM BY PATHOLOGIST: CPT | Performed by: DERMATOLOGY

## 2023-06-20 PROCEDURE — 88341 IMHCHEM/IMCYTCHM EA ADD ANTB: CPT | Mod: 26,,, | Performed by: DERMATOLOGY

## 2023-06-20 PROCEDURE — 3288F FALL RISK ASSESSMENT DOCD: CPT | Mod: CPTII,S$GLB,, | Performed by: DERMATOLOGY

## 2023-06-20 PROCEDURE — 3008F PR BODY MASS INDEX (BMI) DOCUMENTED: ICD-10-PCS | Mod: CPTII,S$GLB,, | Performed by: DERMATOLOGY

## 2023-06-20 PROCEDURE — 11102 TANGNTL BX SKIN SINGLE LES: CPT | Mod: S$GLB,,, | Performed by: DERMATOLOGY

## 2023-06-20 PROCEDURE — 3288F PR FALLS RISK ASSESSMENT DOCUMENTED: ICD-10-PCS | Mod: CPTII,S$GLB,, | Performed by: DERMATOLOGY

## 2023-06-20 NOTE — PATIENT INSTRUCTIONS
Shave Biopsy Wound Care    Your doctor has performed a shave biopsy today.  A band aid and vaseline ointment has been placed over the site.  This should remain in place for 24 hours.  It is recommended that you keep the area dry for the first 24 hours.  After 24 hours, you may remove the band aid and wash the area with warm soap and water and apply Vaseline jelly.  Many patients prefer to use Neosporin or Bacitracin ointment.  This is acceptable; however, know that you can develop an allergy to this medication even if you have used it safely for years.  It is important to keep the area moist.  Letting it dry out and get air slows healing time, and will worsen the scar.  Band aid is optional after first 24 hours.      If you notice increasing redness, tenderness, pain, or yellow drainage at the biopsy site, please notify your doctor.  These are signs of an infection.    If your biopsy site is bleeding, apply firm pressure for 15 minutes straight.  Repeat for another 15 minutes, if it is still bleeding.   If the surgical site continues to bleed, then please contact your doctor.       [facility] - DERMATOLOGY  [address], [address]  [place] [address]  Dept: [phone]  Dept Fax: [phone]

## 2023-06-20 NOTE — PROGRESS NOTES
Subjective:      Patient ID:  Sandrita Kaiser is a 67 y.o. female who presents for   Chief Complaint   Patient presents with    Spot     Left cheek, back     New patient    Here today for a dark area on left cheek x 3 months that has grown without any symptoms, PCP felt it had darkened considerably in 6 months  C/o spots on back that she can feel but no other symptoms    Has no hx of NMSC  has no fhx of MM    Current Outpatient Medications:   ·  atomoxetine (STRATTERA) 25 MG capsule, Take 1 capsule (25 mg total) by mouth once daily., Disp: 30 capsule, Rfl: 2  ·  atorvastatin (LIPITOR) 20 MG tablet, Take 1 tablet (20 mg total) by mouth once daily., Disp: 90 tablet, Rfl: 1  ·  ciclopirox (PENLAC) 8 % Soln, Apply topically nightly., Disp: 6.6 mL, Rfl: 3  ·  EScitalopram oxalate (LEXAPRO) 20 MG tablet, Take 1 tablet (20 mg total) by mouth once daily., Disp: 90 tablet, Rfl: 1  ·  gabapentin (NEURONTIN) 300 MG capsule, Take 1 capsule (300 mg total) by mouth 3 (three) times daily., Disp: 90 capsule, Rfl: 11  ·  multivitamin (THERAGRAN) per tablet, Take 1 tablet by mouth once daily., Disp: , Rfl:   ·  tiZANidine (ZANAFLEX) 4 MG tablet, TK 1 T PO Q 6 H PRN, Disp: 90 tablet, Rfl: 5  ·  vit A/vit C/vit E/zinc/copper (ICAPS AREDS ORAL), Take by mouth., Disp: , Rfl:   ·  losartan-hydrochlorothiazide 100-12.5 mg (HYZAAR) 100-12.5 mg Tab, Take 1 tablet by mouth once daily., Disp: 90 tablet, Rfl: 1      Review of Systems   Constitutional:  Negative for fever and chills.   Skin:  Positive for wears hat. Negative for itching, rash, dry skin, daily sunscreen use and activity-related sunscreen use.   Hematologic/Lymphatic: Does not bruise/bleed easily.     Objective:   Physical Exam   Constitutional: She appears well-developed and well-nourished. No distress.   Neurological: She is alert and oriented to person, place, and time. She is not disoriented.   Psychiatric: She has a normal mood and affect.   Skin:   Areas Examined  (abnormalities noted in diagram):   Scalp / Hair Palpated and Inspected  Head / Face Inspection Performed  Neck Inspection Performed  Chest / Axilla Inspection Performed  Abdomen Inspection Performed  Back Inspection Performed  RUE Inspected  LUE Inspection Performed  Nails and Digits Inspection Performed               Diagram Legend     Erythematous scaling macule/papule c/w actinic keratosis       Vascular papule c/w angioma      Pigmented verrucoid papule/plaque c/w seborrheic keratosis      Yellow umbilicated papule c/w sebaceous hyperplasia      Irregularly shaped tan macule c/w lentigo     1-2 mm smooth white papules consistent with Milia      Movable subcutaneous cyst with punctum c/w epidermal inclusion cyst      Subcutaneous movable cyst c/w pilar cyst      Firm pink to brown papule c/w dermatofibroma      Pedunculated fleshy papule(s) c/w skin tag(s)      Evenly pigmented macule c/w junctional nevus     Mildly variegated pigmented, slightly irregular-bordered macule c/w mildly atypical nevus      Flesh colored to evenly pigmented papule c/w intradermal nevus       Pink pearly papule/plaque c/w basal cell carcinoma      Erythematous hyperkeratotic cursted plaque c/w SCC      Surgical scar with no sign of skin cancer recurrence      Open and closed comedones      Inflammatory papules and pustules      Verrucoid papule consistent consistent with wart     Erythematous eczematous patches and plaques     Dystrophic onycholytic nail with subungual debris c/w onychomycosis     Umbilicated papule    Erythematous-base heme-crusted tan verrucoid plaque consistent with inflamed seborrheic keratosis     Erythematous Silvery Scaling Plaque c/w Psoriasis     See annotation    Sampled darker area on lower anterior quadrant, not circled area    Assessment / Plan:      Pathology Orders:       Normal Orders This Visit    Specimen to Pathology, Dermatology     Questions:    Procedure Type: Dermatology and skin neoplasms     Number of Specimens: 1    ------------------------: -------------------------    Spec 1 Procedure: Biopsy    Spec 1 Clinical Impression: lentigo vs LM partially sampled    Spec 1 Source: left cheek    Release to patient:           Neoplasm of uncertain behavior of skin  -     Specimen to Pathology, Dermatology  Shave biopsy procedure note:    Shave biopsy performed after verbal consent including risk of infection, scar, recurrence, need for additional treatment of site. Area prepped with alcohol, anesthetized with approximately 1.0cc of 1% lidocaine with epinephrine. Lesional tissue shaved with razor blade. Hemostasis achieved with application of aluminum chloride followed by hyfrecation. No complications. Dressing applied. Wound care explained.    Facial lesion  -     Ambulatory referral/consult to Dermatology    Seborrheic keratoses  These are benign inherited growths without a malignant potential. Reassurance given to patient. No treatment is necessary.     Cherry angioma  This is a benign vascular lesion. Reassurance given. No treatment required.     Junctional nevus of back  Careful dermoscopy evaluation of nevi performed with none identified as needing biopsy today  Monitor for new mole or moles that are becoming bigger, darker, irritated, or developing irregular borders.     Patient instructed in importance in daily broad spectrum sun protection of at least spf 30. Mineral sunscreen ingredients preferred (Zinc +/- Titanium) and can be found OTC.   Recommend Elta MD for daily use on face and neck.  Patient encouraged to wear hat for all outdoor exposure.   Also discussed sun avoidance and use of protective clothing.           Follow up if symptoms worsen or fail to improve.

## 2023-07-12 LAB
FINAL PATHOLOGIC DIAGNOSIS: NORMAL
GROSS: NORMAL
Lab: NORMAL
MICROSCOPIC EXAM: NORMAL

## 2023-09-14 ENCOUNTER — OFFICE VISIT (OUTPATIENT)
Dept: FAMILY MEDICINE | Facility: CLINIC | Age: 68
End: 2023-09-14
Payer: MEDICARE

## 2023-09-14 ENCOUNTER — PATIENT MESSAGE (OUTPATIENT)
Dept: FAMILY MEDICINE | Facility: CLINIC | Age: 68
End: 2023-09-14

## 2023-09-14 VITALS
OXYGEN SATURATION: 96 % | WEIGHT: 221.81 LBS | HEIGHT: 65 IN | HEART RATE: 70 BPM | BODY MASS INDEX: 36.96 KG/M2 | DIASTOLIC BLOOD PRESSURE: 88 MMHG | SYSTOLIC BLOOD PRESSURE: 138 MMHG

## 2023-09-14 DIAGNOSIS — Z00.00 PHYSICAL EXAM: ICD-10-CM

## 2023-09-14 DIAGNOSIS — Z79.899 HIGH RISK MEDICATION USE: ICD-10-CM

## 2023-09-14 DIAGNOSIS — I10 HYPERTENSION, UNSPECIFIED TYPE: ICD-10-CM

## 2023-09-14 DIAGNOSIS — E78.5 HYPERLIPIDEMIA, UNSPECIFIED HYPERLIPIDEMIA TYPE: ICD-10-CM

## 2023-09-14 DIAGNOSIS — Z23 NEED FOR VACCINATION: Primary | ICD-10-CM

## 2023-09-14 DIAGNOSIS — F32.A DEPRESSION, UNSPECIFIED DEPRESSION TYPE: ICD-10-CM

## 2023-09-14 PROCEDURE — 1101F PR PT FALLS ASSESS DOC 0-1 FALLS W/OUT INJ PAST YR: ICD-10-PCS | Mod: CPTII,S$GLB,, | Performed by: NURSE PRACTITIONER

## 2023-09-14 PROCEDURE — 3079F DIAST BP 80-89 MM HG: CPT | Mod: CPTII,S$GLB,, | Performed by: NURSE PRACTITIONER

## 2023-09-14 PROCEDURE — 3066F PR DOCUMENTATION OF TREATMENT FOR NEPHROPATHY: ICD-10-PCS | Mod: CPTII,S$GLB,, | Performed by: NURSE PRACTITIONER

## 2023-09-14 PROCEDURE — 3061F PR NEG MICROALBUMINURIA RESULT DOCUMENTED/REVIEW: ICD-10-PCS | Mod: CPTII,S$GLB,, | Performed by: NURSE PRACTITIONER

## 2023-09-14 PROCEDURE — 1159F MED LIST DOCD IN RCRD: CPT | Mod: CPTII,S$GLB,, | Performed by: NURSE PRACTITIONER

## 2023-09-14 PROCEDURE — 90694 VACC AIIV4 NO PRSRV 0.5ML IM: CPT | Mod: S$GLB,,, | Performed by: NURSE PRACTITIONER

## 2023-09-14 PROCEDURE — 3079F PR MOST RECENT DIASTOLIC BLOOD PRESSURE 80-89 MM HG: ICD-10-PCS | Mod: CPTII,S$GLB,, | Performed by: NURSE PRACTITIONER

## 2023-09-14 PROCEDURE — G0008 ADMIN INFLUENZA VIRUS VAC: HCPCS | Mod: S$GLB,,, | Performed by: NURSE PRACTITIONER

## 2023-09-14 PROCEDURE — 3066F NEPHROPATHY DOC TX: CPT | Mod: CPTII,S$GLB,, | Performed by: NURSE PRACTITIONER

## 2023-09-14 PROCEDURE — 3288F FALL RISK ASSESSMENT DOCD: CPT | Mod: CPTII,S$GLB,, | Performed by: NURSE PRACTITIONER

## 2023-09-14 PROCEDURE — 99214 PR OFFICE/OUTPT VISIT, EST, LEVL IV, 30-39 MIN: ICD-10-PCS | Mod: S$GLB,,, | Performed by: NURSE PRACTITIONER

## 2023-09-14 PROCEDURE — 1160F RVW MEDS BY RX/DR IN RCRD: CPT | Mod: CPTII,S$GLB,, | Performed by: NURSE PRACTITIONER

## 2023-09-14 PROCEDURE — 90694 FLU VACCINE - QUADRIVALENT - ADJUVANTED: ICD-10-PCS | Mod: S$GLB,,, | Performed by: NURSE PRACTITIONER

## 2023-09-14 PROCEDURE — 3075F PR MOST RECENT SYSTOLIC BLOOD PRESS GE 130-139MM HG: ICD-10-PCS | Mod: CPTII,S$GLB,, | Performed by: NURSE PRACTITIONER

## 2023-09-14 PROCEDURE — G0008 FLU VACCINE - QUADRIVALENT - ADJUVANTED: ICD-10-PCS | Mod: S$GLB,,, | Performed by: NURSE PRACTITIONER

## 2023-09-14 PROCEDURE — 1101F PT FALLS ASSESS-DOCD LE1/YR: CPT | Mod: CPTII,S$GLB,, | Performed by: NURSE PRACTITIONER

## 2023-09-14 PROCEDURE — 3288F PR FALLS RISK ASSESSMENT DOCUMENTED: ICD-10-PCS | Mod: CPTII,S$GLB,, | Performed by: NURSE PRACTITIONER

## 2023-09-14 PROCEDURE — 3008F BODY MASS INDEX DOCD: CPT | Mod: CPTII,S$GLB,, | Performed by: NURSE PRACTITIONER

## 2023-09-14 PROCEDURE — 3008F PR BODY MASS INDEX (BMI) DOCUMENTED: ICD-10-PCS | Mod: CPTII,S$GLB,, | Performed by: NURSE PRACTITIONER

## 2023-09-14 PROCEDURE — 1159F PR MEDICATION LIST DOCUMENTED IN MEDICAL RECORD: ICD-10-PCS | Mod: CPTII,S$GLB,, | Performed by: NURSE PRACTITIONER

## 2023-09-14 PROCEDURE — 1160F PR REVIEW ALL MEDS BY PRESCRIBER/CLIN PHARMACIST DOCUMENTED: ICD-10-PCS | Mod: CPTII,S$GLB,, | Performed by: NURSE PRACTITIONER

## 2023-09-14 PROCEDURE — 3075F SYST BP GE 130 - 139MM HG: CPT | Mod: CPTII,S$GLB,, | Performed by: NURSE PRACTITIONER

## 2023-09-14 PROCEDURE — 99214 OFFICE O/P EST MOD 30 MIN: CPT | Mod: S$GLB,,, | Performed by: NURSE PRACTITIONER

## 2023-09-14 PROCEDURE — 3061F NEG MICROALBUMINURIA REV: CPT | Mod: CPTII,S$GLB,, | Performed by: NURSE PRACTITIONER

## 2023-09-14 RX ORDER — ATORVASTATIN CALCIUM 20 MG/1
20 TABLET, FILM COATED ORAL DAILY
Qty: 90 TABLET | Refills: 1 | Status: SHIPPED | OUTPATIENT
Start: 2023-09-14 | End: 2024-03-18 | Stop reason: SDUPTHER

## 2023-09-14 RX ORDER — ATOMOXETINE 25 MG/1
25 CAPSULE ORAL DAILY
Qty: 30 CAPSULE | Refills: 2 | Status: SHIPPED | OUTPATIENT
Start: 2023-09-14 | End: 2023-12-28 | Stop reason: SDUPTHER

## 2023-09-14 RX ORDER — ESCITALOPRAM OXALATE 20 MG/1
20 TABLET ORAL DAILY
Qty: 90 TABLET | Refills: 1 | Status: SHIPPED | OUTPATIENT
Start: 2023-09-14 | End: 2024-09-13
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
## 2023-09-15 LAB
ALBUMIN SERPL-MCNC: 4.5 G/DL (ref 3.6–5.1)
ALBUMIN/CREAT UR: 5 MCG/MG CREAT
ALBUMIN/GLOB SERPL: 1.5 (CALC) (ref 1–2.5)
ALP SERPL-CCNC: 115 U/L (ref 37–153)
ALT SERPL-CCNC: 14 U/L (ref 6–29)
AST SERPL-CCNC: 15 U/L (ref 10–35)
BILIRUB SERPL-MCNC: 0.6 MG/DL (ref 0.2–1.2)
BUN SERPL-MCNC: 11 MG/DL (ref 7–25)
BUN/CREAT SERPL: ABNORMAL (CALC) (ref 6–22)
CALCIUM SERPL-MCNC: 9.7 MG/DL (ref 8.6–10.4)
CHLORIDE SERPL-SCNC: 95 MMOL/L (ref 98–110)
CHOLEST SERPL-MCNC: 176 MG/DL
CHOLEST/HDLC SERPL: 2.9 (CALC)
CO2 SERPL-SCNC: 30 MMOL/L (ref 20–32)
CREAT SERPL-MCNC: 1.04 MG/DL (ref 0.5–1.05)
CREAT UR-MCNC: 78 MG/DL (ref 20–275)
EGFR: 58 ML/MIN/1.73M2
GLOBULIN SER CALC-MCNC: 3 G/DL (CALC) (ref 1.9–3.7)
GLUCOSE SERPL-MCNC: 94 MG/DL (ref 65–99)
HDLC SERPL-MCNC: 61 MG/DL
LDLC SERPL CALC-MCNC: 91 MG/DL (CALC)
MICROALBUMIN UR-MCNC: 0.4 MG/DL
NONHDLC SERPL-MCNC: 115 MG/DL (CALC)
POTASSIUM SERPL-SCNC: 4 MMOL/L (ref 3.5–5.3)
PROT SERPL-MCNC: 7.5 G/DL (ref 6.1–8.1)
SODIUM SERPL-SCNC: 135 MMOL/L (ref 135–146)
TRIGL SERPL-MCNC: 147 MG/DL

## 2023-09-15 RX ORDER — LOSARTAN POTASSIUM AND HYDROCHLOROTHIAZIDE 12.5; 1 MG/1; MG/1
1 TABLET ORAL DAILY
Qty: 90 TABLET | Refills: 1 | Status: SHIPPED | OUTPATIENT
Start: 2023-09-15 | End: 2024-03-18 | Stop reason: SDUPTHER

## 2023-09-18 ENCOUNTER — TELEPHONE (OUTPATIENT)
Dept: FAMILY MEDICINE | Facility: CLINIC | Age: 68
End: 2023-09-18

## 2023-09-18 NOTE — TELEPHONE ENCOUNTER
----- Message from Kianna Rodrigues NP sent at 9/15/2023  5:52 PM CDT -----  Cholesterol has improved. Rest of your labs are stable.

## 2023-09-20 DIAGNOSIS — Z78.0 ASYMPTOMATIC MENOPAUSAL STATE: ICD-10-CM

## 2023-12-28 ENCOUNTER — PATIENT MESSAGE (OUTPATIENT)
Dept: FAMILY MEDICINE | Facility: CLINIC | Age: 68
End: 2023-12-28
Payer: MEDICARE

## 2023-12-28 RX ORDER — ATOMOXETINE 25 MG/1
25 CAPSULE ORAL DAILY
Qty: 30 CAPSULE | Refills: 2 | Status: SHIPPED | OUTPATIENT
Start: 2023-12-28 | End: 2024-04-01 | Stop reason: SDUPTHER

## 2024-02-16 ENCOUNTER — PATIENT MESSAGE (OUTPATIENT)
Dept: FAMILY MEDICINE | Facility: CLINIC | Age: 69
End: 2024-02-16
Payer: MEDICARE

## 2024-02-16 DIAGNOSIS — M43.16 SPONDYLOLISTHESIS OF LUMBAR REGION: ICD-10-CM

## 2024-02-16 RX ORDER — GABAPENTIN 300 MG/1
300 CAPSULE ORAL 3 TIMES DAILY
Qty: 90 CAPSULE | Refills: 0 | Status: SHIPPED | OUTPATIENT
Start: 2024-02-16 | End: 2024-04-12 | Stop reason: SDUPTHER

## 2024-03-14 ENCOUNTER — OFFICE VISIT (OUTPATIENT)
Dept: FAMILY MEDICINE | Facility: CLINIC | Age: 69
End: 2024-03-14
Payer: MEDICARE

## 2024-03-14 VITALS
WEIGHT: 221 LBS | DIASTOLIC BLOOD PRESSURE: 78 MMHG | OXYGEN SATURATION: 95 % | HEART RATE: 78 BPM | SYSTOLIC BLOOD PRESSURE: 128 MMHG | BODY MASS INDEX: 35.52 KG/M2 | HEIGHT: 66 IN

## 2024-03-14 DIAGNOSIS — F32.A DEPRESSION, UNSPECIFIED DEPRESSION TYPE: ICD-10-CM

## 2024-03-14 DIAGNOSIS — F90.2 ATTENTION DEFICIT HYPERACTIVITY DISORDER (ADHD), COMBINED TYPE: ICD-10-CM

## 2024-03-14 DIAGNOSIS — N18.31 CHRONIC KIDNEY DISEASE, STAGE 3A: ICD-10-CM

## 2024-03-14 DIAGNOSIS — I10 PRIMARY HYPERTENSION: ICD-10-CM

## 2024-03-14 DIAGNOSIS — E78.2 MIXED HYPERLIPIDEMIA: Primary | ICD-10-CM

## 2024-03-14 DIAGNOSIS — E66.01 SEVERE OBESITY (BMI 35.0-39.9) WITH COMORBIDITY: ICD-10-CM

## 2024-03-14 PROCEDURE — 99214 OFFICE O/P EST MOD 30 MIN: CPT | Mod: S$GLB,,, | Performed by: NURSE PRACTITIONER

## 2024-03-17 ENCOUNTER — PATIENT MESSAGE (OUTPATIENT)
Dept: FAMILY MEDICINE | Facility: CLINIC | Age: 69
End: 2024-03-17
Payer: MEDICARE

## 2024-03-17 DIAGNOSIS — I10 HYPERTENSION, UNSPECIFIED TYPE: ICD-10-CM

## 2024-03-17 DIAGNOSIS — M62.838 MUSCLE SPASM: ICD-10-CM

## 2024-03-17 DIAGNOSIS — E78.5 HYPERLIPIDEMIA, UNSPECIFIED HYPERLIPIDEMIA TYPE: ICD-10-CM

## 2024-03-17 NOTE — PROGRESS NOTES
SUBJECTIVE:    Patient ID: Sandrita Kaiser is a 68 y.o. female.    Chief Complaint: Follow-up (No bottles//Pt here for 6 mo follow up//Declines dexa at this times//JL)    68 year old presents for check  up.  is present during the exam. Takes care of  with advanced dementia. Treated for hypertension, hyperlipidemia, and osteoarthritis add. Taking meds as prescribed.   Straterra  is working well. Taking lexapro as prescribed. Controlling symptoms.  Learning to accept husbands condition   Plans to have labs done today.  Due for mammogram.  Cologuard completed in 2022.     Follow-up  Pertinent negatives include no abdominal pain, arthralgias, chest pain, chills, coughing, fever, headaches, nausea, rash, sore throat, vomiting or weakness.       No visits with results within 6 Month(s) from this visit.   Latest known visit with results is:   Office Visit on 09/14/2023   Component Date Value Ref Range Status    Glucose 09/14/2023 94  65 - 99 mg/dL Final    BUN 09/14/2023 11  7 - 25 mg/dL Final    Creatinine 09/14/2023 1.04  0.50 - 1.05 mg/dL Final    eGFR 09/14/2023 58 (L)  > OR = 60 mL/min/1.73m2 Final    BUN/Creatinine Ratio 09/14/2023 SEE NOTE:  6 - 22 (calc) Final    Sodium 09/14/2023 135  135 - 146 mmol/L Final    Potassium 09/14/2023 4.0  3.5 - 5.3 mmol/L Final    Chloride 09/14/2023 95 (L)  98 - 110 mmol/L Final    CO2 09/14/2023 30  20 - 32 mmol/L Final    Calcium 09/14/2023 9.7  8.6 - 10.4 mg/dL Final    Total Protein 09/14/2023 7.5  6.1 - 8.1 g/dL Final    Albumin 09/14/2023 4.5  3.6 - 5.1 g/dL Final    Globulin, Total 09/14/2023 3.0  1.9 - 3.7 g/dL (calc) Final    Albumin/Globulin Ratio 09/14/2023 1.5  1.0 - 2.5 (calc) Final    Total Bilirubin 09/14/2023 0.6  0.2 - 1.2 mg/dL Final    Alkaline Phosphatase 09/14/2023 115  37 - 153 U/L Final    AST 09/14/2023 15  10 - 35 U/L Final    ALT 09/14/2023 14  6 - 29 U/L Final    Cholesterol 09/14/2023 176  <200 mg/dL Final    HDL 09/14/2023 61  > OR =  50 mg/dL Final    Triglycerides 2023 147  <150 mg/dL Final    LDL Cholesterol 2023 91  mg/dL (calc) Final    HDL/Cholesterol Ratio 2023 2.9  <5.0 (calc) Final    Non HDL Chol. (LDL+VLDL) 2023 115  <130 mg/dL (calc) Final    Creatinine, Urine 2023 78  20 - 275 mg/dL Final    Microalb, Ur 2023 0.4  See Note: mg/dL Final    Microalb/Creat Ratio 2023 5  <30 mcg/mg creat Final       Past Medical History:   Diagnosis Date    High cholesterol     Hypertension      Social History     Socioeconomic History    Marital status:    Tobacco Use    Smoking status: Former     Current packs/day: 0.00     Average packs/day: 1 pack/day for 26.1 years (26.1 ttl pk-yrs)     Types: Cigarettes     Start date: 1975     Quit date: 5/10/2001     Years since quittin.8     Passive exposure: Past    Smokeless tobacco: Former     Quit date: 3/10/2001    Tobacco comments:     03/10/2001-   Substance and Sexual Activity    Alcohol use: Not Currently     Alcohol/week: 0.0 standard drinks of alcohol    Drug use: Never    Sexual activity: Not Currently     Partners: Male     Comment: In menopause for several years     Social Determinants of Health     Stress: No Stress Concern Present (10/29/2019)    [employer] - Occupational Stress Questionnaire     Feeling of Stress : Not at all     Past Surgical History:   Procedure Laterality Date    BREAST BIOPSY      EYE SURGERY  2019    Cataracts    LUMBAR FUSION Left 2019    Procedure: FUSION, SPINE, LUMBAR Procedure: Stg 1: Left L4-5 oblique interbody fusion (Interbody spacer, allograft) / Stg 2:L4-5 Posterior instrumentation;  Surgeon: Ramos Miller MD;  Location: [facility];  Service: Neurosurgery;  Laterality: Left;  FUSION, SPINE, LUMBAR  Procedure: Stg 1: Left L4-5 oblique interbody fusion (Interbody spacer, allograft) / Stg 2:L4-5 Posterior instrumentation  Surg    SPINE SURGERY  2019    Lumbar Fusion     TONSILLECTOMY      TUBAL LIGATION       Family History   Problem Relation Age of Onset    Breast cancer Mother     Hearing loss Mother         Since 7 months old    Hypertension Mother     Hearing loss Father         Since 3 years old    Hypertension Father        Tests to Keep You Healthy    Mammogram: DUE  Colon Cancer Screening: Met on 4/8/2022  Last Blood Pressure <= 139/89 (3/14/2024): Yes      Review of patient's allergies indicates:   Allergen Reactions    Lisinopril      Other reaction(s): BETTE    Adhesive Rash       Current Outpatient Medications:     atomoxetine (STRATTERA) 25 MG capsule, Take 1 capsule (25 mg total) by mouth once daily., Disp: 30 capsule, Rfl: 2    atorvastatin (LIPITOR) 20 MG tablet, Take 1 tablet (20 mg total) by mouth once daily., Disp: 90 tablet, Rfl: 1    EScitalopram oxalate (LEXAPRO) 20 MG tablet, Take 1 tablet (20 mg total) by mouth once daily., Disp: 90 tablet, Rfl: 1    gabapentin (NEURONTIN) 300 MG capsule, Take 1 capsule (300 mg total) by mouth 3 (three) times daily., Disp: 90 capsule, Rfl: 0    losartan-hydrochlorothiazide 100-12.5 mg (HYZAAR) 100-12.5 mg Tab, Take 1 tablet by mouth once daily., Disp: 90 tablet, Rfl: 1    multivitamin (THERAGRAN) per tablet, Take 1 tablet by mouth once daily., Disp: , Rfl:     tiZANidine (ZANAFLEX) 4 MG tablet, TK 1 T PO Q 6 H PRN, Disp: 90 tablet, Rfl: 5    vit A/vit C/vit E/zinc/copper (ICAPS AREDS ORAL), Take by mouth., Disp: , Rfl:     Review of Systems   Constitutional:  Negative for chills, fever and unexpected weight change.   HENT:  Negative for ear pain, rhinorrhea and sore throat.    Eyes:  Negative for pain and visual disturbance.   Respiratory:  Negative for cough and shortness of breath.    Cardiovascular:  Negative for chest pain, palpitations and leg swelling.   Gastrointestinal:  Negative for abdominal pain, diarrhea, nausea and vomiting.   Genitourinary:  Negative for difficulty urinating, hematuria and vaginal bleeding.  "  Musculoskeletal:  Negative for arthralgias.   Skin:  Negative for rash.   Neurological:  Negative for dizziness, weakness and headaches.   Psychiatric/Behavioral:  Negative for agitation and sleep disturbance. The patient is not nervous/anxious.           Objective:      Vitals:    03/14/24 1330   BP: 128/78   Pulse: 78   SpO2: 95%   Weight: 100.2 kg (221 lb)   Height: 5' 6" (1.676 m)     Physical Exam  Vitals and nursing note reviewed.   Constitutional:       General: She is not in acute distress.     Appearance: Normal appearance. She is well-developed.   HENT:      Head: Normocephalic.      Right Ear: External ear normal.      Left Ear: External ear normal.   Eyes:      Conjunctiva/sclera: Conjunctivae normal.      Pupils: Pupils are equal, round, and reactive to light.   Neck:      Vascular: No JVD.   Cardiovascular:      Rate and Rhythm: Normal rate and regular rhythm.      Heart sounds: No murmur heard.  Pulmonary:      Effort: Pulmonary effort is normal.      Breath sounds: Normal breath sounds.   Abdominal:      General: Bowel sounds are normal.      Palpations: Abdomen is soft.   Musculoskeletal:         General: No deformity. Normal range of motion.      Cervical back: Normal range of motion and neck supple.   Lymphadenopathy:      Cervical: No cervical adenopathy.   Skin:     General: Skin is warm and dry.      Findings: No rash.   Neurological:      Mental Status: She is alert and oriented to person, place, and time.      Gait: Gait normal.   Psychiatric:         Speech: Speech normal.         Behavior: Behavior normal.           Assessment:       1. Mixed hyperlipidemia    2. Severe obesity (BMI 35.0-39.9) with comorbidity    3. Chronic kidney disease, stage 3a    4. Attention deficit hyperactivity disorder (ADHD), combined type    5. Primary hypertension    6. Depression, unspecified depression type         Plan:       Mixed hyperlipidemia  Comments:  lipitor    Severe obesity (BMI 35.0-39.9) with " comorbidity  Comments:  diet    Chronic kidney disease, stage 3a    Attention deficit hyperactivity disorder (ADHD), combined type  Comments:  strattera    Primary hypertension  Comments:  well controlled    Depression, unspecified depression type  Comments:  lexapro      Follow up in about 6 months (around 9/14/2024), or if symptoms worsen or fail to improve, for medication management.        3/17/2024 Kianna Rodrigues

## 2024-03-19 RX ORDER — ATORVASTATIN CALCIUM 20 MG/1
20 TABLET, FILM COATED ORAL DAILY
Qty: 90 TABLET | Refills: 1 | Status: SHIPPED | OUTPATIENT
Start: 2024-03-19 | End: 2024-09-15

## 2024-03-19 RX ORDER — LOSARTAN POTASSIUM AND HYDROCHLOROTHIAZIDE 12.5; 1 MG/1; MG/1
1 TABLET ORAL DAILY
Qty: 90 TABLET | Refills: 1 | Status: SHIPPED | OUTPATIENT
Start: 2024-03-19

## 2024-03-19 RX ORDER — TIZANIDINE 4 MG/1
TABLET ORAL
Qty: 90 TABLET | Refills: 5 | Status: SHIPPED | OUTPATIENT
Start: 2024-03-19

## 2024-03-28 ENCOUNTER — PATIENT MESSAGE (OUTPATIENT)
Dept: ADMINISTRATIVE | Facility: HOSPITAL | Age: 69
End: 2024-03-28
Payer: MEDICARE

## 2024-03-31 ENCOUNTER — PATIENT MESSAGE (OUTPATIENT)
Dept: FAMILY MEDICINE | Facility: CLINIC | Age: 69
End: 2024-03-31
Payer: MEDICARE

## 2024-04-01 RX ORDER — ATOMOXETINE 25 MG/1
25 CAPSULE ORAL DAILY
Qty: 30 CAPSULE | Refills: 2 | Status: SHIPPED | OUTPATIENT
Start: 2024-04-01 | End: 2024-04-15

## 2024-04-12 DIAGNOSIS — M43.16 SPONDYLOLISTHESIS OF LUMBAR REGION: ICD-10-CM

## 2024-04-15 ENCOUNTER — PATIENT MESSAGE (OUTPATIENT)
Dept: FAMILY MEDICINE | Facility: CLINIC | Age: 69
End: 2024-04-15
Payer: MEDICARE

## 2024-04-15 RX ORDER — GABAPENTIN 300 MG/1
300 CAPSULE ORAL 3 TIMES DAILY
Qty: 90 CAPSULE | Refills: 0 | Status: SHIPPED | OUTPATIENT
Start: 2024-04-15 | End: 2024-05-06 | Stop reason: SDUPTHER

## 2024-04-15 RX ORDER — ATOMOXETINE 40 MG/1
40 CAPSULE ORAL DAILY
Qty: 30 CAPSULE | Refills: 0 | Status: SHIPPED | OUTPATIENT
Start: 2024-04-15 | End: 2024-05-06 | Stop reason: SDUPTHER

## 2024-04-19 ENCOUNTER — PATIENT MESSAGE (OUTPATIENT)
Dept: ADMINISTRATIVE | Facility: HOSPITAL | Age: 69
End: 2024-04-19
Payer: MEDICARE

## 2024-05-06 ENCOUNTER — PATIENT MESSAGE (OUTPATIENT)
Dept: FAMILY MEDICINE | Facility: CLINIC | Age: 69
End: 2024-05-06
Payer: MEDICARE

## 2024-05-06 DIAGNOSIS — M43.16 SPONDYLOLISTHESIS OF LUMBAR REGION: ICD-10-CM

## 2024-05-06 RX ORDER — GABAPENTIN 300 MG/1
300 CAPSULE ORAL 3 TIMES DAILY
Qty: 90 CAPSULE | Refills: 5 | Status: SHIPPED | OUTPATIENT
Start: 2024-05-06

## 2024-05-06 RX ORDER — ATOMOXETINE 40 MG/1
40 CAPSULE ORAL DAILY
Qty: 30 CAPSULE | Refills: 2 | Status: SHIPPED | OUTPATIENT
Start: 2024-05-06

## 2024-05-19 NOTE — TELEPHONE ENCOUNTER
Patient is medically cleared for surgery.    Patient requests all Lab, Cardiology, and Radiology Results on their Discharge Instructions

## 2024-05-28 DIAGNOSIS — Z00.00 ENCOUNTER FOR MEDICARE ANNUAL WELLNESS EXAM: ICD-10-CM

## 2024-06-14 DIAGNOSIS — F32.A DEPRESSION, UNSPECIFIED DEPRESSION TYPE: ICD-10-CM

## 2024-06-17 RX ORDER — ESCITALOPRAM OXALATE 20 MG/1
20 TABLET ORAL DAILY
Qty: 90 TABLET | Refills: 1 | Status: SHIPPED | OUTPATIENT
Start: 2024-06-17 | End: 2025-06-17

## 2024-09-19 ENCOUNTER — OFFICE VISIT (OUTPATIENT)
Dept: FAMILY MEDICINE | Facility: CLINIC | Age: 69
End: 2024-09-19
Payer: MEDICARE

## 2024-09-19 VITALS
HEIGHT: 66 IN | SYSTOLIC BLOOD PRESSURE: 110 MMHG | OXYGEN SATURATION: 99 % | DIASTOLIC BLOOD PRESSURE: 70 MMHG | BODY MASS INDEX: 34.39 KG/M2 | WEIGHT: 214 LBS | HEART RATE: 57 BPM

## 2024-09-19 DIAGNOSIS — Z79.899 HIGH RISK MEDICATION USE: ICD-10-CM

## 2024-09-19 DIAGNOSIS — N93.9 VAGINAL BLEEDING: ICD-10-CM

## 2024-09-19 DIAGNOSIS — Z78.0 MENOPAUSE: ICD-10-CM

## 2024-09-19 DIAGNOSIS — F41.9 ANXIETY: ICD-10-CM

## 2024-09-19 DIAGNOSIS — I10 HYPERTENSION, UNSPECIFIED TYPE: ICD-10-CM

## 2024-09-19 DIAGNOSIS — Z13.820 SCREENING FOR OSTEOPOROSIS: ICD-10-CM

## 2024-09-19 DIAGNOSIS — Z12.31 ENCOUNTER FOR SCREENING MAMMOGRAM FOR MALIGNANT NEOPLASM OF BREAST: ICD-10-CM

## 2024-09-19 DIAGNOSIS — Z23 NEED FOR INFLUENZA VACCINATION: Primary | ICD-10-CM

## 2024-09-19 DIAGNOSIS — E78.5 HYPERLIPIDEMIA, UNSPECIFIED HYPERLIPIDEMIA TYPE: ICD-10-CM

## 2024-09-19 DIAGNOSIS — F98.8 ATTENTION DEFICIT DISORDER, UNSPECIFIED HYPERACTIVITY PRESENCE: ICD-10-CM

## 2024-09-19 DIAGNOSIS — R61 SWEATING PROFUSELY: ICD-10-CM

## 2024-09-19 PROCEDURE — 1125F AMNT PAIN NOTED PAIN PRSNT: CPT | Mod: CPTII,S$GLB,, | Performed by: NURSE PRACTITIONER

## 2024-09-19 PROCEDURE — 3288F FALL RISK ASSESSMENT DOCD: CPT | Mod: CPTII,S$GLB,, | Performed by: NURSE PRACTITIONER

## 2024-09-19 PROCEDURE — 99214 OFFICE O/P EST MOD 30 MIN: CPT | Mod: S$GLB,,, | Performed by: NURSE PRACTITIONER

## 2024-09-19 PROCEDURE — 3074F SYST BP LT 130 MM HG: CPT | Mod: CPTII,S$GLB,, | Performed by: NURSE PRACTITIONER

## 2024-09-19 PROCEDURE — 1160F RVW MEDS BY RX/DR IN RCRD: CPT | Mod: CPTII,S$GLB,, | Performed by: NURSE PRACTITIONER

## 2024-09-19 PROCEDURE — G0008 ADMIN INFLUENZA VIRUS VAC: HCPCS | Mod: S$GLB,,, | Performed by: NURSE PRACTITIONER

## 2024-09-19 PROCEDURE — 3066F NEPHROPATHY DOC TX: CPT | Mod: CPTII,S$GLB,, | Performed by: NURSE PRACTITIONER

## 2024-09-19 PROCEDURE — 1159F MED LIST DOCD IN RCRD: CPT | Mod: CPTII,S$GLB,, | Performed by: NURSE PRACTITIONER

## 2024-09-19 PROCEDURE — 3008F BODY MASS INDEX DOCD: CPT | Mod: CPTII,S$GLB,, | Performed by: NURSE PRACTITIONER

## 2024-09-19 PROCEDURE — 3078F DIAST BP <80 MM HG: CPT | Mod: CPTII,S$GLB,, | Performed by: NURSE PRACTITIONER

## 2024-09-19 PROCEDURE — 1101F PT FALLS ASSESS-DOCD LE1/YR: CPT | Mod: CPTII,S$GLB,, | Performed by: NURSE PRACTITIONER

## 2024-09-19 PROCEDURE — 90653 IIV ADJUVANT VACCINE IM: CPT | Mod: S$GLB,,, | Performed by: NURSE PRACTITIONER

## 2024-09-19 PROCEDURE — 3061F NEG MICROALBUMINURIA REV: CPT | Mod: CPTII,S$GLB,, | Performed by: NURSE PRACTITIONER

## 2024-09-19 RX ORDER — ATOMOXETINE 40 MG/1
40 CAPSULE ORAL DAILY
Qty: 30 CAPSULE | Refills: 2 | Status: SHIPPED | OUTPATIENT
Start: 2024-09-19

## 2024-09-19 RX ORDER — OMEPRAZOLE 20 MG/1
20 CAPSULE, DELAYED RELEASE ORAL DAILY
Qty: 90 CAPSULE | Refills: 3 | Status: SHIPPED | OUTPATIENT
Start: 2024-09-19 | End: 2025-09-19

## 2024-09-19 NOTE — PROGRESS NOTES
SUBJECTIVE:    Patient ID: Sandrita Kaiser is a 69 y.o. female.    Chief Complaint: Follow-up (Bottles brought//Pt is here for a 6 month follow up and discuss overeating//Pt would like to know if we give the RSV Vaccine//Would like the Flu vaccineKE)    69year old presents for check  up.  is present during the exam. Takes care of  with advanced dementia. Treated for hypertension, hyperlipidemia, and osteoarthritis add. Taking meds as prescribed.   Straterra  is working well. Taking lexapro as prescribed. Controlling symptoms.  Having issues with significant sweating. Reports does not like going out in public because so significant. No hemoptysis, no unexplained weight loss.  Reports back In June experienced 2 days of vaginal bleeding. None since. No discharge. Not sexually active. No uti symptoms. No recent pap    Follow-up  Pertinent negatives include no abdominal pain, arthralgias, chest pain, chills, coughing, fever, headaches, nausea, rash, sore throat, vomiting or weakness.       Office Visit on 09/19/2024   Component Date Value Ref Range Status    WBC 09/19/2024 7.0  3.8 - 10.8 Thousand/uL Final    RBC 09/19/2024 5.09  3.80 - 5.10 Million/uL Final    Hemoglobin 09/19/2024 14.2  11.7 - 15.5 g/dL Final    Hematocrit 09/19/2024 44.9  35.0 - 45.0 % Final    MCV 09/19/2024 88.2  80.0 - 100.0 fL Final    MCH 09/19/2024 27.9  27.0 - 33.0 pg Final    MCHC 09/19/2024 31.6 (L)  32.0 - 36.0 g/dL Final    RDW 09/19/2024 13.7  11.0 - 15.0 % Final    Platelets 09/19/2024 362  140 - 400 Thousand/uL Final    MPV 09/19/2024 8.9  7.5 - 12.5 fL Final    Neutrophils, Abs 09/19/2024 3,808  1,500 - 7,800 cells/uL Final    Lymph # 09/19/2024 2,513  850 - 3,900 cells/uL Final    Mono # 09/19/2024 448  200 - 950 cells/uL Final    Eos # 09/19/2024 182  15 - 500 cells/uL Final    Baso # 09/19/2024 49  0 - 200 cells/uL Final    Neutrophils Relative 09/19/2024 54.4  % Final    Lymph % 09/19/2024 35.9  % Final    Mono %  09/19/2024 6.4  % Final    Eosinophil % 09/19/2024 2.6  % Final    Basophil % 09/19/2024 0.7  % Final    Glucose 09/19/2024 94  65 - 99 mg/dL Final    BUN 09/19/2024 10  7 - 25 mg/dL Final    Creatinine 09/19/2024 0.93  0.60 - 1.00 mg/dL Final    eGFR 09/19/2024 66  > OR = 60 mL/min/1.73m2 Final    BUN/Creatinine Ratio 09/19/2024 SEE NOTE:  6 - 22 (calc) Final    Sodium 09/19/2024 134 (L)  135 - 146 mmol/L Final    Potassium 09/19/2024 3.9  3.5 - 5.3 mmol/L Final    Chloride 09/19/2024 93 (L)  98 - 110 mmol/L Final    CO2 09/19/2024 30  20 - 32 mmol/L Final    Calcium 09/19/2024 9.7  8.6 - 10.4 mg/dL Final    Total Protein 09/19/2024 7.5  6.1 - 8.1 g/dL Final    Albumin 09/19/2024 4.3  3.6 - 5.1 g/dL Final    Globulin, Total 09/19/2024 3.2  1.9 - 3.7 g/dL (calc) Final    Albumin/Globulin Ratio 09/19/2024 1.3  1.0 - 2.5 (calc) Final    Total Bilirubin 09/19/2024 0.5  0.2 - 1.2 mg/dL Final    Alkaline Phosphatase 09/19/2024 104  37 - 153 U/L Final    AST 09/19/2024 19  10 - 35 U/L Final    ALT 09/19/2024 19  6 - 29 U/L Final    Cholesterol 09/19/2024 175  <200 mg/dL Final    HDL 09/19/2024 67  > OR = 50 mg/dL Final    Triglycerides 09/19/2024 131  <150 mg/dL Final    LDL Cholesterol 09/19/2024 85  mg/dL (calc) Final    HDL/Cholesterol Ratio 09/19/2024 2.6  <5.0 (calc) Final    Non HDL Chol. (LDL+VLDL) 09/19/2024 108  <130 mg/dL (calc) Final    TSH w/reflex to FT4 09/19/2024 2.29  0.40 - 4.50 mIU/L Final    Creatinine, Urine 09/19/2024 106  20 - 275 mg/dL Final    Microalb, Ur 09/19/2024 3.1  See Note: mg/dL Final    Microalb/Creat Ratio 09/19/2024 29  <30 mg/g creat Final    Color, UA 09/19/2024 YELLOW  YELLOW Final    Appearance, UA 09/19/2024 CLOUDY (A)  CLEAR Final    Specific Gravity, UA 09/19/2024 1.011  1.001 - 1.035 Final    pH, UA 09/19/2024 7.0  5.0 - 8.0 Final    Glucose, UA 09/19/2024 NEGATIVE  NEGATIVE Final    Bilirubin, UA 09/19/2024 NEGATIVE  NEGATIVE Final    Ketones, UA 09/19/2024 NEGATIVE  NEGATIVE  Final    Occult Blood UA 2024 NEGATIVE  NEGATIVE Final    Protein, UA 2024 NEGATIVE  NEGATIVE Final    Nitrite, UA 2024 NEGATIVE  NEGATIVE Final    Leukocytes, UA 2024 NEGATIVE  NEGATIVE Final    WBC Casts, UA 2024 0-5  < OR = 5 /HPF Final    RBC Casts, UA 2024 NONE SEEN  < OR = 2 /HPF Final    Squam Epithel, UA 2024 20-40 (A)  < OR = 5 /HPF Final    Bacteria, UA 2024 MODERATE (A)  NONE SEEN /HPF Final    Hyaline Casts, UA 2024 NONE SEEN  NONE SEEN /LPF Final    Service Cmt: 2024 SEE COMMENT   Final    Reflexive Urine Culture 2024 SEE COMMENT   Final       Past Medical History:   Diagnosis Date    High cholesterol     Hypertension      Social History     Socioeconomic History    Marital status:    Tobacco Use    Smoking status: Former     Current packs/day: 0.00     Average packs/day: 1 pack/day for 26.1 years (26.1 ttl pk-yrs)     Types: Cigarettes     Start date: 1975     Quit date: 5/10/2001     Years since quittin.3     Passive exposure: Past    Smokeless tobacco: Former     Quit date: 3/10/2001    Tobacco comments:     03/10/2001-   Substance and Sexual Activity    Alcohol use: Not Currently     Alcohol/week: 0.0 standard drinks of alcohol    Drug use: Never    Sexual activity: Not Currently     Partners: Male     Comment: In menopause for several years     Social Determinants of Health     Stress: No Stress Concern Present (10/29/2019)    [employer] [place] [employer] - Occupational Stress Questionnaire     Feeling of Stress : Not at all     Past Surgical History:   Procedure Laterality Date    BREAST BIOPSY      EYE SURGERY  2019    Cataracts    LUMBAR FUSION Left 2019    Procedure: FUSION, SPINE, LUMBAR Procedure: Stg 1: Left L4-5 oblique interbody fusion (Interbody spacer, allograft) / Stg 2:L4-5 Posterior instrumentation;  Surgeon: Ramos Miller MD;  Location: [facility];  Service: Neurosurgery;  Laterality:  Left;  FUSION, SPINE, LUMBAR  Procedure: Stg 1: Left L4-5 oblique interbody fusion (Interbody spacer, allograft) / Stg 2:L4-5 Posterior instrumentation  Surg    MOUTH SURGERY      Received Dentures    SPINE SURGERY  2019    Lumbar Fusion    TONSILLECTOMY      TUBAL LIGATION       Family History   Problem Relation Name Age of Onset    Breast cancer Mother Taylor Alba     Hearing loss Mother Taylor Alba         Since 7 months old    Hypertension Mother Taylor Alba     Hearing loss Father Denis Alba         Since 3 years old    Hypertension Father Denis Alba        Tests to Keep You Healthy    Mammogram: SCHEDULED  Colon Cancer Screening: Met on 4/8/2022  Last Blood Pressure <= 139/89 (9/19/2024): Yes      Review of patient's allergies indicates:   Allergen Reactions    Lisinopril      Other reaction(s): BETTE    Adhesive Rash       Current Outpatient Medications:     EScitalopram oxalate (LEXAPRO) 20 MG tablet, Take 1 tablet (20 mg total) by mouth once daily., Disp: 90 tablet, Rfl: 1    gabapentin (NEURONTIN) 300 MG capsule, Take 1 capsule (300 mg total) by mouth 3 (three) times daily., Disp: 90 capsule, Rfl: 5    losartan-hydrochlorothiazide 100-12.5 mg (HYZAAR) 100-12.5 mg Tab, Take 1 tablet by mouth once daily., Disp: 90 tablet, Rfl: 1    multivitamin (THERAGRAN) per tablet, Take 1 tablet by mouth once daily., Disp: , Rfl:     tiZANidine (ZANAFLEX) 4 MG tablet, TK 1 T PO Q 6 H PRN, Disp: 90 tablet, Rfl: 5    vit A/vit C/vit E/zinc/copper (ICAPS AREDS ORAL), Take by mouth., Disp: , Rfl:     atomoxetine (STRATTERA) 40 MG capsule, Take 1 capsule (40 mg total) by mouth once daily., Disp: 30 capsule, Rfl: 2    atorvastatin (LIPITOR) 20 MG tablet, Take 1 tablet (20 mg total) by mouth once daily., Disp: 90 tablet, Rfl: 1    omeprazole (PRILOSEC) 20 MG capsule, Take 1 capsule (20 mg total) by mouth once daily., Disp: 90 capsule, Rfl: 3    Review of Systems   Constitutional:  Negative for chills, fever and  "unexpected weight change.   HENT:  Negative for ear pain, rhinorrhea and sore throat.    Eyes:  Negative for pain and visual disturbance.   Respiratory:  Negative for cough and shortness of breath.    Cardiovascular:  Negative for chest pain, palpitations and leg swelling.   Gastrointestinal:  Negative for abdominal pain, diarrhea, nausea and vomiting.   Genitourinary:  Negative for difficulty urinating, hematuria and vaginal bleeding.   Musculoskeletal:  Negative for arthralgias.   Skin:  Negative for rash.   Neurological:  Negative for dizziness, weakness and headaches.   Psychiatric/Behavioral:  Negative for agitation and sleep disturbance. The patient is not nervous/anxious.           Objective:      Vitals:    09/19/24 1321   BP: 110/70   Pulse: (!) 57   SpO2: 99%   Weight: 97.1 kg (214 lb)   Height: 5' 6" (1.676 m)     Physical Exam  Vitals and nursing note reviewed.   Constitutional:       General: She is not in acute distress.     Appearance: Normal appearance. She is well-developed.   HENT:      Head: Normocephalic.      Right Ear: External ear normal.      Left Ear: External ear normal.   Eyes:      Conjunctiva/sclera: Conjunctivae normal.      Pupils: Pupils are equal, round, and reactive to light.   Neck:      Vascular: No JVD.   Cardiovascular:      Rate and Rhythm: Normal rate and regular rhythm.      Heart sounds: No murmur heard.  Pulmonary:      Effort: Pulmonary effort is normal.      Breath sounds: Normal breath sounds.   Abdominal:      General: Bowel sounds are normal.      Palpations: Abdomen is soft.   Musculoskeletal:         General: No deformity. Normal range of motion.      Cervical back: Normal range of motion and neck supple.   Lymphadenopathy:      Cervical: No cervical adenopathy.   Skin:     General: Skin is warm and dry.      Findings: No rash.   Neurological:      Mental Status: She is alert and oriented to person, place, and time.      Gait: Gait normal.   Psychiatric:         " Speech: Speech normal.         Behavior: Behavior normal.           Assessment:       1. Need for influenza vaccination    2. Encounter for screening mammogram for malignant neoplasm of breast    3. Screening for osteoporosis    4. Menopause    5. High risk medication use    6. Hypertension, unspecified type    7. Hyperlipidemia, unspecified hyperlipidemia type    8. Vaginal bleeding    9. Anxiety    10. Attention deficit disorder, unspecified hyperactivity presence    11. Sweating profusely         Plan:       Need for influenza vaccination  -     influenza (adjuvanted) (Fluad) 45 mcg/0.5 mL IM vaccine (> or = 64 yo) 0.5 mL    Encounter for screening mammogram for malignant neoplasm of breast  Comments:  mammogram  Orders:  -     Mammo Digital Screening Bilat w/ Dejon; Future; Expected date: 09/19/2024    Screening for osteoporosis  Comments:  dexa  Orders:  -     DXA Bone Density Axial Skeleton 1 or more sites; Future; Expected date: 09/19/2024    Menopause  Comments:  dexa  Orders:  -     DXA Bone Density Axial Skeleton 1 or more sites; Future; Expected date: 09/19/2024    High risk medication use  -     DXA Bone Density Axial Skeleton 1 or more sites; Future; Expected date: 09/19/2024  -     Mammo Digital Screening Bilat w/ Dejon; Future; Expected date: 09/19/2024  -     atomoxetine (STRATTERA) 40 MG capsule; Take 1 capsule (40 mg total) by mouth once daily.  Dispense: 30 capsule; Refill: 2  -     omeprazole (PRILOSEC) 20 MG capsule; Take 1 capsule (20 mg total) by mouth once daily.  Dispense: 90 capsule; Refill: 3  -     CBC Auto Differential; Future; Expected date: 09/19/2024  -     Comprehensive Metabolic Panel; Future; Expected date: 09/19/2024  -     Lipid Panel; Future; Expected date: 09/19/2024  -     TSH w/reflex to FT4; Future; Expected date: 09/19/2024  -     Microalbumin/Creatinine Ratio, Urine; Future; Expected date: 09/19/2024  -     Urinalysis, Reflex to Urine Culture Urine, Clean Catch; Future;  Expected date: 09/19/2024    Hypertension, unspecified type  Comments:  bp controlled  Orders:  -     DXA Bone Density Axial Skeleton 1 or more sites; Future; Expected date: 09/19/2024  -     Mammo Digital Screening Bilat w/ Dejon; Future; Expected date: 09/19/2024  -     atomoxetine (STRATTERA) 40 MG capsule; Take 1 capsule (40 mg total) by mouth once daily.  Dispense: 30 capsule; Refill: 2  -     omeprazole (PRILOSEC) 20 MG capsule; Take 1 capsule (20 mg total) by mouth once daily.  Dispense: 90 capsule; Refill: 3  -     CBC Auto Differential; Future; Expected date: 09/19/2024  -     Comprehensive Metabolic Panel; Future; Expected date: 09/19/2024  -     Lipid Panel; Future; Expected date: 09/19/2024  -     TSH w/reflex to FT4; Future; Expected date: 09/19/2024  -     Microalbumin/Creatinine Ratio, Urine; Future; Expected date: 09/19/2024  -     Urinalysis, Reflex to Urine Culture Urine, Clean Catch; Future; Expected date: 09/19/2024    Hyperlipidemia, unspecified hyperlipidemia type  Comments:  lipitor  Orders:  -     DXA Bone Density Axial Skeleton 1 or more sites; Future; Expected date: 09/19/2024  -     Mammo Digital Screening Bilat w/ Dejon; Future; Expected date: 09/19/2024  -     atomoxetine (STRATTERA) 40 MG capsule; Take 1 capsule (40 mg total) by mouth once daily.  Dispense: 30 capsule; Refill: 2  -     omeprazole (PRILOSEC) 20 MG capsule; Take 1 capsule (20 mg total) by mouth once daily.  Dispense: 90 capsule; Refill: 3  -     CBC Auto Differential; Future; Expected date: 09/19/2024  -     Comprehensive Metabolic Panel; Future; Expected date: 09/19/2024  -     Lipid Panel; Future; Expected date: 09/19/2024  -     TSH w/reflex to FT4; Future; Expected date: 09/19/2024  -     Microalbumin/Creatinine Ratio, Urine; Future; Expected date: 09/19/2024  -     Urinalysis, Reflex to Urine Culture Urine, Clean Catch; Future; Expected date: 09/19/2024    Vaginal bleeding  Comments:  pelvic ultrasound  Orders:  -      US Pelvis Complete Non OB; Future; Expected date: 09/19/2024    Anxiety  Comments:  lexapro    Attention deficit disorder, unspecified hyperactivity presence  Comments:  strattera    Sweating profusely  Comments:  1/2 lexapro in am 1/2 in pm. call and let us know if sweating improves      Follow up in about 3 months (around 12/19/2024), or if symptoms worsen or fail to improve, for medication management.        9/23/2024 Kianna Rodrigues

## 2024-09-20 LAB
ALBUMIN SERPL-MCNC: 4.3 G/DL (ref 3.6–5.1)
ALBUMIN/CREAT UR: 29 MG/G CREAT
ALBUMIN/GLOB SERPL: 1.3 (CALC) (ref 1–2.5)
ALP SERPL-CCNC: 104 U/L (ref 37–153)
ALT SERPL-CCNC: 19 U/L (ref 6–29)
APPEARANCE UR: ABNORMAL
AST SERPL-CCNC: 19 U/L (ref 10–35)
BACTERIA #/AREA URNS HPF: ABNORMAL /HPF
BACTERIA UR CULT: ABNORMAL
BASOPHILS # BLD AUTO: 49 CELLS/UL (ref 0–200)
BASOPHILS NFR BLD AUTO: 0.7 %
BILIRUB SERPL-MCNC: 0.5 MG/DL (ref 0.2–1.2)
BILIRUB UR QL STRIP: NEGATIVE
BUN SERPL-MCNC: 10 MG/DL (ref 7–25)
BUN/CREAT SERPL: ABNORMAL (CALC) (ref 6–22)
CALCIUM SERPL-MCNC: 9.7 MG/DL (ref 8.6–10.4)
CHLORIDE SERPL-SCNC: 93 MMOL/L (ref 98–110)
CHOLEST SERPL-MCNC: 175 MG/DL
CHOLEST/HDLC SERPL: 2.6 (CALC)
CO2 SERPL-SCNC: 30 MMOL/L (ref 20–32)
COLOR UR: YELLOW
CREAT SERPL-MCNC: 0.93 MG/DL (ref 0.6–1)
CREAT UR-MCNC: 106 MG/DL (ref 20–275)
EGFR: 66 ML/MIN/1.73M2
EOSINOPHIL # BLD AUTO: 182 CELLS/UL (ref 15–500)
EOSINOPHIL NFR BLD AUTO: 2.6 %
ERYTHROCYTE [DISTWIDTH] IN BLOOD BY AUTOMATED COUNT: 13.7 % (ref 11–15)
GLOBULIN SER CALC-MCNC: 3.2 G/DL (CALC) (ref 1.9–3.7)
GLUCOSE SERPL-MCNC: 94 MG/DL (ref 65–99)
GLUCOSE UR QL STRIP: NEGATIVE
HCT VFR BLD AUTO: 44.9 % (ref 35–45)
HDLC SERPL-MCNC: 67 MG/DL
HGB BLD-MCNC: 14.2 G/DL (ref 11.7–15.5)
HGB UR QL STRIP: NEGATIVE
HYALINE CASTS #/AREA URNS LPF: ABNORMAL /LPF
KETONES UR QL STRIP: NEGATIVE
LDLC SERPL CALC-MCNC: 85 MG/DL (CALC)
LEUKOCYTE ESTERASE UR QL STRIP: NEGATIVE
LYMPHOCYTES # BLD AUTO: 2513 CELLS/UL (ref 850–3900)
LYMPHOCYTES NFR BLD AUTO: 35.9 %
MCH RBC QN AUTO: 27.9 PG (ref 27–33)
MCHC RBC AUTO-ENTMCNC: 31.6 G/DL (ref 32–36)
MCV RBC AUTO: 88.2 FL (ref 80–100)
MICROALBUMIN UR-MCNC: 3.1 MG/DL
MONOCYTES # BLD AUTO: 448 CELLS/UL (ref 200–950)
MONOCYTES NFR BLD AUTO: 6.4 %
NEUTROPHILS # BLD AUTO: 3808 CELLS/UL (ref 1500–7800)
NEUTROPHILS NFR BLD AUTO: 54.4 %
NITRITE UR QL STRIP: NEGATIVE
NONHDLC SERPL-MCNC: 108 MG/DL (CALC)
PH UR STRIP: 7 [PH] (ref 5–8)
PLATELET # BLD AUTO: 362 THOUSAND/UL (ref 140–400)
PMV BLD REES-ECKER: 8.9 FL (ref 7.5–12.5)
POTASSIUM SERPL-SCNC: 3.9 MMOL/L (ref 3.5–5.3)
PROT SERPL-MCNC: 7.5 G/DL (ref 6.1–8.1)
PROT UR QL STRIP: NEGATIVE
RBC # BLD AUTO: 5.09 MILLION/UL (ref 3.8–5.1)
RBC #/AREA URNS HPF: ABNORMAL /HPF
SERVICE CMNT-IMP: ABNORMAL
SODIUM SERPL-SCNC: 134 MMOL/L (ref 135–146)
SP GR UR STRIP: 1.01 (ref 1–1.03)
SQUAMOUS #/AREA URNS HPF: ABNORMAL /HPF
TRIGL SERPL-MCNC: 131 MG/DL
TSH SERPL-ACNC: 2.29 MIU/L (ref 0.4–4.5)
WBC # BLD AUTO: 7 THOUSAND/UL (ref 3.8–10.8)
WBC #/AREA URNS HPF: ABNORMAL /HPF

## 2024-09-23 ENCOUNTER — HOSPITAL ENCOUNTER (OUTPATIENT)
Dept: RADIOLOGY | Facility: HOSPITAL | Age: 69
Discharge: HOME | End: 2024-09-23
Attending: NURSE PRACTITIONER
Payer: MEDICARE

## 2024-09-23 DIAGNOSIS — N93.9 VAGINAL BLEEDING: ICD-10-CM

## 2024-09-23 PROCEDURE — 76856 US EXAM PELVIC COMPLETE: CPT | Mod: 26,,, | Performed by: RADIOLOGY

## 2024-09-23 PROCEDURE — 76830 TRANSVAGINAL US NON-OB: CPT | Mod: 26,,, | Performed by: RADIOLOGY

## 2024-09-23 PROCEDURE — 76830 TRANSVAGINAL US NON-OB: CPT | Mod: TC

## 2024-09-23 PROCEDURE — 76856 US EXAM PELVIC COMPLETE: CPT | Mod: TC

## 2024-09-24 DIAGNOSIS — E78.5 HYPERLIPIDEMIA, UNSPECIFIED HYPERLIPIDEMIA TYPE: ICD-10-CM

## 2024-09-24 DIAGNOSIS — I10 HYPERTENSION, UNSPECIFIED TYPE: ICD-10-CM

## 2024-09-25 RX ORDER — LOSARTAN POTASSIUM AND HYDROCHLOROTHIAZIDE 12.5; 1 MG/1; MG/1
1 TABLET ORAL DAILY
Qty: 90 TABLET | Refills: 1 | Status: SHIPPED | OUTPATIENT
Start: 2024-09-25

## 2024-09-25 RX ORDER — ATORVASTATIN CALCIUM 20 MG/1
20 TABLET, FILM COATED ORAL DAILY
Qty: 90 TABLET | Refills: 1 | Status: SHIPPED | OUTPATIENT
Start: 2024-09-25 | End: 2025-03-24

## 2024-09-26 ENCOUNTER — TELEPHONE (OUTPATIENT)
Dept: FAMILY MEDICINE | Facility: CLINIC | Age: 69
End: 2024-09-26
Payer: MEDICARE

## 2024-09-26 ENCOUNTER — HOSPITAL ENCOUNTER (OUTPATIENT)
Dept: RADIOLOGY | Facility: HOSPITAL | Age: 69
Discharge: HOME | End: 2024-09-26
Attending: NURSE PRACTITIONER
Payer: MEDICARE

## 2024-09-26 VITALS — HEIGHT: 66 IN | WEIGHT: 214 LBS | BODY MASS INDEX: 34.39 KG/M2

## 2024-09-26 DIAGNOSIS — Z12.31 ENCOUNTER FOR SCREENING MAMMOGRAM FOR MALIGNANT NEOPLASM OF BREAST: ICD-10-CM

## 2024-09-26 DIAGNOSIS — E78.5 HYPERLIPIDEMIA, UNSPECIFIED HYPERLIPIDEMIA TYPE: ICD-10-CM

## 2024-09-26 DIAGNOSIS — I10 HYPERTENSION, UNSPECIFIED TYPE: ICD-10-CM

## 2024-09-26 DIAGNOSIS — Z79.899 HIGH RISK MEDICATION USE: ICD-10-CM

## 2024-09-26 PROCEDURE — 77063 BREAST TOMOSYNTHESIS BI: CPT | Mod: 26,,, | Performed by: RADIOLOGY

## 2024-09-26 PROCEDURE — 77067 SCR MAMMO BI INCL CAD: CPT | Mod: TC,PO

## 2024-09-26 PROCEDURE — 77063 BREAST TOMOSYNTHESIS BI: CPT | Mod: TC,PO

## 2024-09-26 PROCEDURE — 77067 SCR MAMMO BI INCL CAD: CPT | Mod: 26,,, | Performed by: RADIOLOGY

## 2024-09-26 NOTE — TELEPHONE ENCOUNTER
----- Message from Kianna Rodrigues NP sent at 9/23/2024 12:04 AM CDT -----  Labs are within normal limits. Continue as is

## 2024-10-16 ENCOUNTER — HOSPITAL ENCOUNTER (OUTPATIENT)
Dept: RADIOLOGY | Facility: CLINIC | Age: 69
Discharge: HOME | End: 2024-10-16
Attending: NURSE PRACTITIONER
Payer: MEDICARE

## 2024-10-16 DIAGNOSIS — E78.5 HYPERLIPIDEMIA, UNSPECIFIED HYPERLIPIDEMIA TYPE: ICD-10-CM

## 2024-10-16 DIAGNOSIS — Z79.899 HIGH RISK MEDICATION USE: ICD-10-CM

## 2024-10-16 DIAGNOSIS — I10 HYPERTENSION, UNSPECIFIED TYPE: ICD-10-CM

## 2024-10-16 DIAGNOSIS — Z78.0 MENOPAUSE: ICD-10-CM

## 2024-10-16 DIAGNOSIS — Z13.820 SCREENING FOR OSTEOPOROSIS: ICD-10-CM

## 2024-10-16 PROCEDURE — 77080 DXA BONE DENSITY AXIAL: CPT | Mod: 26,,, | Performed by: STUDENT IN AN ORGANIZED HEALTH CARE EDUCATION/TRAINING PROGRAM

## 2024-10-16 PROCEDURE — 77080 DXA BONE DENSITY AXIAL: CPT | Mod: TC,PO

## 2024-11-06 ENCOUNTER — PATIENT MESSAGE (OUTPATIENT)
Dept: FAMILY MEDICINE | Facility: CLINIC | Age: 69
End: 2024-11-06
Payer: MEDICARE

## 2024-11-06 DIAGNOSIS — M62.838 MUSCLE SPASM: ICD-10-CM

## 2024-11-06 RX ORDER — TIZANIDINE 4 MG/1
TABLET ORAL
Qty: 90 TABLET | Refills: 5 | Status: SHIPPED | OUTPATIENT
Start: 2024-11-06

## 2024-11-07 DIAGNOSIS — M43.16 SPONDYLOLISTHESIS OF LUMBAR REGION: ICD-10-CM

## 2024-11-07 RX ORDER — GABAPENTIN 300 MG/1
300 CAPSULE ORAL 3 TIMES DAILY
Qty: 90 CAPSULE | Refills: 5 | Status: SHIPPED | OUTPATIENT
Start: 2024-11-07

## 2024-11-18 ENCOUNTER — PATIENT MESSAGE (OUTPATIENT)
Dept: FAMILY MEDICINE | Facility: CLINIC | Age: 69
End: 2024-11-18
Payer: MEDICARE

## 2024-11-19 RX ORDER — MELOXICAM 15 MG/1
15 TABLET ORAL DAILY
Qty: 30 TABLET | Refills: 0 | Status: SHIPPED | OUTPATIENT
Start: 2024-11-19

## (undated) DEVICE — DRESSING MEPILEX BORDER 4 X 4

## (undated) DEVICE — DRAPE INCISE IOBAN 2 23X23IN

## (undated) DEVICE — SEE MEDLINE ITEM 157150

## (undated) DEVICE — SUT VICRYL 2 0 CT 2

## (undated) DEVICE — Device

## (undated) DEVICE — DRAIN FLAT JP 10X4MM P

## (undated) DEVICE — SEE MEDLINE ITEM 157148

## (undated) DEVICE — DRAPE STERI INSTRUMENT 1018

## (undated) DEVICE — SUT VICRYL PLUS 2-0 CT1 18

## (undated) DEVICE — CORD BIPOLAR 12 FOOT

## (undated) DEVICE — DRESSING SURGICAL 1/2X1/2

## (undated) DEVICE — SYS ILLUMINATION MARS3V SPINE

## (undated) DEVICE — SYR 10CC LUER LOCK

## (undated) DEVICE — DRAPE STERI-DRAPE 1000 17X11IN

## (undated) DEVICE — KIT POWDER ABSORBABLE GELATIN

## (undated) DEVICE — SUT CTD VICRYL 3-0 CR/SH

## (undated) DEVICE — COVER OVERHEAD SURG LT BLUE

## (undated) DEVICE — GLOVE SURG BIOGEL LATEX SZ 7.5

## (undated) DEVICE — DRESSING TELFA N ADH 3X8

## (undated) DEVICE — SUT VICRYL 0 CT 2 ANTIMICRO

## (undated) DEVICE — NDL JAMSHIDI 10GA

## (undated) DEVICE — PENCIL ROCKER SWITCH 10FT CORD

## (undated) DEVICE — SEE MEDLINE ITEM 157117

## (undated) DEVICE — TOWEL OR NONABSORB ADH 17X26

## (undated) DEVICE — SEE MEDLINE ITEM 157131

## (undated) DEVICE — DRAPE C-ARM/MOBILE XRAY 44X80

## (undated) DEVICE — APPLICATOR CHLORAPREP ORN 26ML

## (undated) DEVICE — SKINMARKER W/RULER DEVON

## (undated) DEVICE — SEE MEDLINE ITEM 156905

## (undated) DEVICE — GLOVE BIOGEL PI MICRO INDIC 6

## (undated) DEVICE — GLOVE 7.5 PROTEXIS PI BLUE

## (undated) DEVICE — DRESSING TRANS 4X4 TEGADERM

## (undated) DEVICE — SEE MEDLINE ITEM 157116

## (undated) DEVICE — NDL SPINAL SPINOCAN 22GX3.5

## (undated) DEVICE — DRESSING AQUACEL FOAM 5 X 5

## (undated) DEVICE — SUT ETHILON 3-0 PS2 18 BLK

## (undated) DEVICE — GLOVE PROTEXIS HYDROGEL SZ7

## (undated) DEVICE — ADHESIVE MASTISOL VIAL 48/BX

## (undated) DEVICE — TAPE SILK 3IN

## (undated) DEVICE — BLADE ELECTRO EDGE INSULATED

## (undated) DEVICE — SUT VICRYL PLUS 0 CT1 18IN

## (undated) DEVICE — SUT JJ41G O VICRYL

## (undated) DEVICE — KIT SPINAL PATIENT CARE JACK

## (undated) DEVICE — SUT 0 VICRYL / CT-1

## (undated) DEVICE — SEE MEDLINE ITEM 146313

## (undated) DEVICE — GLOVE BIOGEL PIMICRO INDIC 7.5

## (undated) DEVICE — SUT 2-0 ETHILON 18 FS

## (undated) DEVICE — NDL HYPODERMIC BLUNT 18G 1.5IN

## (undated) DEVICE — SPONGE GAUZE 16PLY 4X4

## (undated) DEVICE — ELECTRODE REM PLYHSV RETURN 9

## (undated) DEVICE — FORCEP BPLR BAYONETTED STR

## (undated) DEVICE — SUT MCRYL PLUS 4-0 PS2 27IN

## (undated) DEVICE — COVER BACK TABLE 72X21